# Patient Record
Sex: FEMALE | Race: WHITE | NOT HISPANIC OR LATINO | Employment: UNEMPLOYED | URBAN - METROPOLITAN AREA
[De-identification: names, ages, dates, MRNs, and addresses within clinical notes are randomized per-mention and may not be internally consistent; named-entity substitution may affect disease eponyms.]

---

## 2017-04-20 ENCOUNTER — HOSPITAL ENCOUNTER (EMERGENCY)
Facility: MEDICAL CENTER | Age: 22
End: 2017-04-20
Attending: EMERGENCY MEDICINE
Payer: MEDICAID

## 2017-04-20 ENCOUNTER — APPOINTMENT (OUTPATIENT)
Dept: RADIOLOGY | Facility: MEDICAL CENTER | Age: 22
End: 2017-04-20
Attending: EMERGENCY MEDICINE
Payer: MEDICAID

## 2017-04-20 VITALS
RESPIRATION RATE: 18 BRPM | DIASTOLIC BLOOD PRESSURE: 66 MMHG | SYSTOLIC BLOOD PRESSURE: 116 MMHG | OXYGEN SATURATION: 98 % | HEART RATE: 88 BPM | WEIGHT: 155.2 LBS | BODY MASS INDEX: 30.47 KG/M2 | TEMPERATURE: 98 F | HEIGHT: 60 IN

## 2017-04-20 DIAGNOSIS — N20.0 KIDNEY CALCULI: ICD-10-CM

## 2017-04-20 LAB
APPEARANCE UR: CLEAR
COLOR UR: YELLOW
GLUCOSE UR STRIP.AUTO-MCNC: NEGATIVE MG/DL
HCG UR QL: NEGATIVE
KETONES UR STRIP.AUTO-MCNC: NEGATIVE MG/DL
LEUKOCYTE ESTERASE UR QL STRIP.AUTO: NEGATIVE
NITRITE UR QL STRIP.AUTO: NEGATIVE
PH UR STRIP.AUTO: 6 [PH]
PROT UR QL STRIP: NEGATIVE MG/DL
RBC UR QL AUTO: NEGATIVE
SP GR UR STRIP.AUTO: 1.02

## 2017-04-20 PROCEDURE — 81002 URINALYSIS NONAUTO W/O SCOPE: CPT

## 2017-04-20 PROCEDURE — 99283 EMERGENCY DEPT VISIT LOW MDM: CPT

## 2017-04-20 PROCEDURE — 74176 CT ABD & PELVIS W/O CONTRAST: CPT

## 2017-04-20 PROCEDURE — 81025 URINE PREGNANCY TEST: CPT

## 2017-04-20 ASSESSMENT — PAIN SCALES - GENERAL: PAINLEVEL_OUTOF10: 7

## 2017-04-20 NOTE — ED NOTES
assistng with care-pt rounding. Pt in no apparent distress in kohler bed. Vs as charted, denies needs

## 2017-04-20 NOTE — ED AVS SNAPSHOT
4/20/2017    Marlys Jane  [address] [place] [address]    Dear Marlys:    [facility] wants to ensure your discharge home is safe and you or your loved ones have had all of your questions answered regarding your care after you leave the hospital.    Below is a list of resources and contact information should you have any questions regarding your hospital stay, follow-up instructions, or active medical symptoms.    Questions or Concerns Regarding… Contact   Medical Questions Related to Your Discharge  (7 days a week, 8am-5pm) Contact a Nurse Care Coordinator   [phone]   Medical Questions Not Related to Your Discharge  (24 hours a day / 7 days a week)  Contact the Nurse Health Line   [phone]    Medications or Discharge Instructions Refer to your discharge packet   or contact your [facility] Primary Care Provider   [phone]   Follow-up Appointment(s) Schedule your appointment via [employer]   or contact Scheduling [phone]   Billing Review your statement via [employer]  or contact Billing [phone]   Medical Records Review your records via [employer]   or contact Medical Records [phone]     You may receive a telephone call within two days of discharge. This call is to make certain you understand your discharge instructions and have the opportunity to have any questions answered. You can also easily access your medical information, test results and upcoming appointments via the [employer] free online health management tool. You can learn more and sign up at [employer]oodle. For assistance setting up your [employer] account, please call [phone].    Once again, we want to ensure your discharge home is safe and that you have a clear understanding of any next steps in your care. If you have any questions or concerns, please do not hesitate to contact us, we are here for you. Thank you for choosing [facility] for your healthcare needs.    Sincerely,    Your [facility] Healthcare Team

## 2017-04-20 NOTE — ED AVS SNAPSHOT
Drop Development Access Code: [email or web address]  Expires: 5/20/2017  5:30 PM    Drop Development  A secure, online tool to manage your health information     [employer]’s Drop Development® is a secure, online tool that connects you to your personalized health information from the privacy of your home -- day or night - making it very easy for you to manage your healthcare. Once the activation process is completed, you can even access your medical information using the Drop Development colleen, which is available for free in the Apple Colleen store or Google Play store.     Drop Development provides the following levels of access (as shown below):   My Chart Features   [facility] Primary Care Doctor [facility]  Specialists [facility]  Urgent  Care Non-[facility]  Primary Care  Doctor   Email your healthcare team securely and privately 24/7 X X X X   Manage appointments: schedule your next appointment; view details of past/upcoming appointments X      Request prescription refills. X      View recent personal medical records, including lab and immunizations X X X X   View health record, including health history, allergies, medications X X X X   Read reports about your outpatient visits, procedures, consult and ER notes X X X X   See your discharge summary, which is a recap of your hospital and/or ER visit that includes your diagnosis, lab results, and care plan. X X       How to register for Drop Development:  1. Go to  [email or web address].  2. Click on the Sign Up Now box, which takes you to the New Member Sign Up page. You will need to provide the following information:  a. Enter your Drop Development Access Code exactly as it appears at the top of this page. (You will not need to use this code after you’ve completed the sign-up process. If you do not sign up before the expiration date, you must request a new code.)   b. Enter your date of birth.   c. Enter your home email address.   d. Click Submit, and follow the next screen’s instructions.  3. Create a Drop Development ID. This will be your Drop Development  login ID and cannot be changed, so think of one that is secure and easy to remember.  4. Create a QM [employer] password. You can change your password at any time.  5. Enter your Password Reset Question and Answer. This can be used at a later time if you forget your password.   6. Enter your e-mail address. This allows you to receive e-mail notifications when new information is available in QM Power.  7. Click Sign Up. You can now view your health information.    For assistance activating your [employer] account, call [phone]

## 2017-04-20 NOTE — ED PROVIDER NOTES
ED Provider Note    CHIEF COMPLAINT  Chief Complaint   Patient presents with   • Urinary Pain   • Back Pain       HPI  Marlys Jane is a 21 y.o. female who presents to the emergency department complaining of left flank pain. She states that she has a history of kidney stones in the past and started having severe pain in the left flank earlier this morning. Pain is 7/10 with no eliciting or alleviating factors. She had nausea but no vomiting. She also complains slight dysuria but no hematuria.    REVIEW OF SYSTEMS  Positives as above. Pertinent negatives include fever, hematochezia, melena, shortness of breath, chest pain  All other review of systems are negative    PAST MEDICAL HISTORY  Past Medical History   Diagnosis Date   • Psychiatric disorder      anxiety   • Gall stones        FAMILY HISTORY  Noncontributory    SOCIAL HISTORY  Social History     Social History   • Marital Status: Single     Spouse Name: N/A   • Number of Children: N/A   • Years of Education: N/A     Social History Main Topics   • Smoking status: Never Smoker    • Smokeless tobacco: Never Used   • Alcohol Use: Yes   • Drug Use: No   • Sexual Activity: Not Asked     Other Topics Concern   • None     Social History Narrative       SURGICAL HISTORY  Past Surgical History   Procedure Laterality Date   • Cystoscopy stent placement N/A 5/17/2016     Procedure: CYSTOSCOPY STENT PLACEMENT;  Surgeon: Sherif Lees M.D.;  Location: SURGERY [facility];  Service:    • Ureteroscopy Right 5/17/2016     Procedure: URETEROSCOPY;  Surgeon: Sherif Lees M.D.;  Location: SURGERY [facility];  Service:    • Lasertripsy Right 5/17/2016     Procedure: LASERTRIPSY LITHO;  Surgeon: Sherif Lees M.D.;  Location: SURGERY [facility];  Service:        CURRENT MEDICATIONS  Home Medications     Reviewed by Pam Mauro (Pharmacy Tech) on 04/20/17 at 1623  Med List Status: Complete    Medication Last Dose Status          Patient Jewel  Taking any Medications                        ALLERGIES  No Known Allergies    PHYSICAL EXAM  VITAL SIGNS: /66 mmHg  Pulse 88  Temp(Src) 36.7 °C (98 °F)  Resp 18  Ht 1.524 m (5')  Wt 70.4 kg (155 lb 3.3 oz)  BMI 30.31 kg/m2  SpO2 98%  LMP 03/13/2017     Constitutional: Well developed, Well nourished, No acute distress, Non-toxic appearance.   Eyes: PERRLA, EOMI, Conjunctiva normal, No discharge.   Cardiovascular: Normal heart rate, Normal rhythm, No murmurs, No rubs, No gallops, and intact distal pulses.   Thorax & Lungs:  No respiratory distress, no rales, no rhonchi, No wheezing, No chest wall tenderness.   Abdomen: Bowel sounds normal, Soft, No tenderness, No guarding, No rebound, No pulsatile masses.   Musculoskeletal: Slight left CVA tenderness  Skin: Warm, Dry, No erythema, No rash.   Extremities: Full range of motion, no deformity, no edema.  Neurologic: Alert & oriented x 3, No focal deficits noted, acting appropriately on exam.  Psychiatric: Affect normal for clinical presentation.    RADIOLOGY/PROCEDURES  CT-RENAL COLIC EVALUATION(A/P W/O)   Final Result         1.  No hydronephrosis. Stable 2 stones in the left kidney. One small stone in the upper pole the right kidney may have developed compared with 11/24/2016.      2.  No acute inflammatory or obstructive process.        Results for orders placed or performed during the hospital encounter of 04/20/17   POC UA   Result Value Ref Range    POC Color Yellow     POC Appearance Clear     POC Glucose Negative Negative mg/dL    POC Ketones Negative Negative mg/dL    POC Specific Gravity 1.025 1.005-1.030    POC Blood Negative Negative    POC Urine PH 6.0 5.0-8.0    POC Protein Negative Negative mg/dL    POC Nitrites Negative Negative    POC Leukocyte Esterase Negative Negative   POC URINE PREGNANCY   Result Value Ref Range    POC Urine Pregnancy Test Negative        COURSE & MEDICAL DECISION MAKING  Pertinent Labs & Imaging studies reviewed. (See  chart for details)  This is a [facility] 21-year-old female presents with flank pain. As concern for possible kidney stone. CT scan reveals evidence of 2 small stable calculi in the left kidney but notes he ureterolithiasis, notes of hydronephrosis or significant obstruction. The patient is not pregnant urinary tract infection. The patient will be following up with Dr. Cardenas with urology for further evaluation and management. She understands the need for follow-up to the emergency department if she has severe pain, nausea or vomiting.  New Prescriptions    No medications on file       FINAL IMPRESSION     1. Kidney calculi      The patient will return for new or worsening symptoms and is stable at the time of discharge.    The patient is referred to a primary physician for blood pressure management, diabetic screening, and for all other preventative health concerns.    DISPOSITION:  Patient will be discharged home in stable condition.    FOLLOW UP:  [facility], Emergency Dept  [address]  [place] [id number] [phone]    If symptoms worsen    Anthony Cardenas M.D.  [address] [place] [address]  [phone]    Schedule an appointment as soon as possible for a visit  As needed      OUTPATIENT MEDICATIONS:  New Prescriptions    No medications on file              Electronically signed by: Oskar Morton, 4/20/2017 4:55 PM

## 2017-04-20 NOTE — ED AVS SNAPSHOT
Home Care Instructions                                                                                                                Marlys Jane   MRN: [id number]    Department:  [facility], Emergency Dept   Date of Visit:  4/20/2017            [facility], Emergency Dept    [address] [place] [address]    Phone:  [phone]      You were seen by     Oskar Morton D.O.      Your Diagnosis Was     Kidney calculi     N20.0       Follow-up Information     1. Follow up with [facility], Emergency Dept.    Specialty:  Emergency Medicine    Why:  If symptoms worsen    Contact information    [address] [place] [address] [phone]        2. Schedule an appointment as soon as possible for a visit with Anthony Cardenas M.D..    Specialty:  Urology    Why:  As needed    Contact information    [address] [place] [address] [phone]        Medication Information     Review all of your home medications and newly ordered medications with your primary doctor and/or pharmacist as soon as possible. Follow medication instructions as directed by your doctor and/or pharmacist.     Please keep your complete medication list with you and share with your physician. Update the information when medications are discontinued, doses are changed, or new medications (including over-the-counter products) are added; and carry medication information at all times in the event of emergency situations.               Medication List      Notice     You have not been prescribed any medications.            Procedures and tests performed during your visit     CT-RENAL COLIC EVALUATION(A/P W/O)    POC UA    POC URINE PREGNANCY        Discharge Instructions       Kidney Stones  Kidney stones (urolithiasis) are deposits that form inside your kidneys. The intense pain is caused by the stone moving through the urinary tract. When  the stone moves, the ureter goes into spasm around the stone. The stone is usually passed in the urine.   CAUSES   · A disorder that makes certain neck glands produce too much parathyroid hormone (primary hyperparathyroidism).  · A buildup of uric acid crystals, similar to gout in your joints.  · Narrowing (stricture) of the ureter.  · A kidney obstruction present at birth (congenital obstruction).  · Previous surgery on the kidney or ureters.  · Numerous kidney infections.  SYMPTOMS   · Feeling sick to your stomach (nauseous).  · Throwing up (vomiting).  · Blood in the urine (hematuria).  · Pain that usually spreads (radiates) to the groin.  · Frequency or urgency of urination.  DIAGNOSIS   · Taking a history and physical exam.  · Blood or urine tests.  · CT scan.  · Occasionally, an examination of the inside of the urinary bladder (cystoscopy) is performed.  TREATMENT   · Observation.  · Increasing your fluid intake.  · Extracorporeal shock wave lithotripsy--This is a noninvasive procedure that uses shock waves to break up kidney stones.  · Surgery may be needed if you have severe pain or persistent obstruction. There are various surgical procedures. Most of the procedures are performed with the use of small instruments. Only small incisions are needed to accommodate these instruments, so recovery time is minimized.  The size, location, and chemical composition are all important variables that will determine the proper choice of action for you. Talk to your health care provider to better understand your situation so that you will minimize the risk of injury to yourself and your kidney.   HOME CARE INSTRUCTIONS   · Drink enough water and fluids to keep your urine clear or pale yellow. This will help you to pass the stone or stone fragments.  · Strain all urine through the provided strainer. Keep all particulate matter and stones for your health care provider to see. The stone causing the pain may be as small as a  grain of salt. It is very important to use the strainer each and every time you pass your urine. The collection of your stone will allow your health care provider to analyze it and verify that a stone has actually passed. The stone analysis will often identify what you can do to reduce the incidence of recurrences.  · Only take over-the-counter or prescription medicines for pain, discomfort, or fever as directed by your health care provider.  · Make a follow-up appointment with your health care provider as directed.  · Get follow-up X-rays if required. The absence of pain does not always mean that the stone has passed. It may have only stopped moving. If the urine remains completely obstructed, it can cause loss of kidney function or even complete destruction of the kidney. It is your responsibility to make sure X-rays and follow-ups are completed. Ultrasounds of the kidney can show blockages and the status of the kidney. Ultrasounds are not associated with any radiation and can be performed easily in a matter of minutes.  SEEK MEDICAL CARE IF:  · You experience pain that is progressive and unresponsive to any pain medicine you have been prescribed.  SEEK IMMEDIATE MEDICAL CARE IF:   · Pain cannot be controlled with the prescribed medicine.  · You have a fever or shaking chills.  · The severity or intensity of pain increases over 18 hours and is not relieved by pain medicine.  · You develop a new onset of abdominal pain.  · You feel faint or pass out.  · You are unable to urinate.  MAKE SURE YOU:   · Understand these instructions.  · Will watch your condition.  · Will get help right away if you are not doing well or get worse.     This information is not intended to replace advice given to you by your health care provider. Make sure you discuss any questions you have with your health care provider.     Document Released: 12/18/2006 Document Revised: 01/08/2016 Document Reviewed: 05/21/2014  [employer]  Patient Education ©2016 [employer] Inc.            Patient Information     Patient Information    Following emergency treatment: all patient requiring follow-up care must return either to a private physician or a clinic if your condition worsens before you are able to obtain further medical attention, please return to the emergency room.     Billing Information    At [facility], we work to make the billing process streamlined for our patients.  Our Representatives are here to answer any questions you may have regarding your hospital bill.  If you have insurance coverage and have supplied your insurance information to us, we will submit a claim to your insurer on your behalf.  Should you have any questions regarding your bill, we can be reached online or by phone as follows:  Online: You are able pay your bills online or live chat with our representatives about any billing questions you may have. We are here to help Monday - Friday from 8:00am to 7:30pm and 9:00am - 12:00pm on Saturdays.  Please visit [email or web address]  for more information.   Phone:  [phone] or [phone]    Please note that your emergency physician, surgeon, pathologist, radiologist, anesthesiologist, and other specialists are not employed by [facility] and will therefore bill separately for their services.  Please contact them directly for any questions concerning their bills at the numbers below:     Emergency Physician Services:  [phone]  [facility]:  [phone]  [facility]:  [phone]  [facility]:  [phone]    1. Your final bill may vary from the amount quoted upon discharge if all procedures are not complete at that time, or if your doctor has additional procedures of which we are not aware. You will receive an additional bill if you return to the Emergency Department at [facility] for suture removal regardless of the facility of  which the sutures were placed.     2. Please arrange for settlement of this account at the emergency registration.    3. All self-pay accounts are due in full at the time of treatment.  If you are unable to meet this obligation then payment is expected within 4-5 days.     4. If you have had radiology studies (CT, X-ray, Ultrasound, MRI), you have received a preliminary result during your emergency department visit. Please contact the radiology department [phone] to receive a copy of your final result. Please discuss the Final result with your primary physician or with the follow up physician provided.     Crisis Hotline:  [place] Crisis Hotline:  6-221-ULHLYOG or 1-500.646.9669  [place] Crisis Hotline:    1-504.620.4019 or 277-160-8099         ED Discharge Follow Up Questions    1. In order to provide you with very good care, we would like to follow up with a phone call in the next few days.  May we have your permission to contact you?     YES /  NO    2. What is the best phone number to call you? (       )_____-__________    3. What is the best time to call you?      Morning  /  Afternoon  /  Evening                   Patient Signature:  ____________________________________________________________    Date:  ____________________________________________________________

## 2017-04-21 NOTE — ED NOTES
Pt D/C to home. D/C instructions given. Pt v/u. Pt leaves ED with no acute changes, complaints or concerns.

## 2017-04-21 NOTE — DISCHARGE INSTRUCTIONS
Kidney Stones  Kidney stones (urolithiasis) are deposits that form inside your kidneys. The intense pain is caused by the stone moving through the urinary tract. When the stone moves, the ureter goes into spasm around the stone. The stone is usually passed in the urine.   CAUSES   · A disorder that makes certain neck glands produce too much parathyroid hormone (primary hyperparathyroidism).  · A buildup of uric acid crystals, similar to gout in your joints.  · Narrowing (stricture) of the ureter.  · A kidney obstruction present at birth (congenital obstruction).  · Previous surgery on the kidney or ureters.  · Numerous kidney infections.  SYMPTOMS   · Feeling sick to your stomach (nauseous).  · Throwing up (vomiting).  · Blood in the urine (hematuria).  · Pain that usually spreads (radiates) to the groin.  · Frequency or urgency of urination.  DIAGNOSIS   · Taking a history and physical exam.  · Blood or urine tests.  · CT scan.  · Occasionally, an examination of the inside of the urinary bladder (cystoscopy) is performed.  TREATMENT   · Observation.  · Increasing your fluid intake.  · Extracorporeal shock wave lithotripsy--This is a noninvasive procedure that uses shock waves to break up kidney stones.  · Surgery may be needed if you have severe pain or persistent obstruction. There are various surgical procedures. Most of the procedures are performed with the use of small instruments. Only small incisions are needed to accommodate these instruments, so recovery time is minimized.  The size, location, and chemical composition are all important variables that will determine the proper choice of action for you. Talk to your health care provider to better understand your situation so that you will minimize the risk of injury to yourself and your kidney.   HOME CARE INSTRUCTIONS   · Drink enough water and fluids to keep your urine clear or pale yellow. This will help you to pass the stone or stone fragments.  · Strain  all urine through the provided strainer. Keep all particulate matter and stones for your health care provider to see. The stone causing the pain may be as small as a grain of salt. It is very important to use the strainer each and every time you pass your urine. The collection of your stone will allow your health care provider to analyze it and verify that a stone has actually passed. The stone analysis will often identify what you can do to reduce the incidence of recurrences.  · Only take over-the-counter or prescription medicines for pain, discomfort, or fever as directed by your health care provider.  · Make a follow-up appointment with your health care provider as directed.  · Get follow-up X-rays if required. The absence of pain does not always mean that the stone has passed. It may have only stopped moving. If the urine remains completely obstructed, it can cause loss of kidney function or even complete destruction of the kidney. It is your responsibility to make sure X-rays and follow-ups are completed. Ultrasounds of the kidney can show blockages and the status of the kidney. Ultrasounds are not associated with any radiation and can be performed easily in a matter of minutes.  SEEK MEDICAL CARE IF:  · You experience pain that is progressive and unresponsive to any pain medicine you have been prescribed.  SEEK IMMEDIATE MEDICAL CARE IF:   · Pain cannot be controlled with the prescribed medicine.  · You have a fever or shaking chills.  · The severity or intensity of pain increases over 18 hours and is not relieved by pain medicine.  · You develop a new onset of abdominal pain.  · You feel faint or pass out.  · You are unable to urinate.  MAKE SURE YOU:   · Understand these instructions.  · Will watch your condition.  · Will get help right away if you are not doing well or get worse.     This information is not intended to replace advice given to you by your health care provider. Make sure you discuss any  questions you have with your health care provider.     Document Released: 12/18/2006 Document Revised: 01/08/2016 Document Reviewed: 05/21/2014  [employer] Interactive Patient Education ©2016 [employer] Inc.

## 2017-09-29 ENCOUNTER — APPOINTMENT (OUTPATIENT)
Dept: RADIOLOGY | Facility: MEDICAL CENTER | Age: 22
End: 2017-09-29
Attending: EMERGENCY MEDICINE
Payer: MEDICAID

## 2017-09-29 ENCOUNTER — HOSPITAL ENCOUNTER (EMERGENCY)
Facility: MEDICAL CENTER | Age: 22
End: 2017-09-29
Attending: EMERGENCY MEDICINE
Payer: MEDICAID

## 2017-09-29 VITALS
OXYGEN SATURATION: 97 % | DIASTOLIC BLOOD PRESSURE: 70 MMHG | WEIGHT: 160.5 LBS | RESPIRATION RATE: 16 BRPM | SYSTOLIC BLOOD PRESSURE: 115 MMHG | HEART RATE: 80 BPM | TEMPERATURE: 98.1 F | BODY MASS INDEX: 31.51 KG/M2 | HEIGHT: 60 IN

## 2017-09-29 DIAGNOSIS — O20.0 THREATENED MISCARRIAGE IN EARLY PREGNANCY: ICD-10-CM

## 2017-09-29 LAB
APPEARANCE UR: CLEAR
B-HCG SERPL-ACNC: 112.1 MIU/ML (ref 0–5)
BACTERIA #/AREA URNS HPF: ABNORMAL /HPF
BASOPHILS # BLD AUTO: 0.3 % (ref 0–1.8)
BASOPHILS # BLD: 0.03 K/UL (ref 0–0.12)
BILIRUB UR QL STRIP.AUTO: NEGATIVE
COLOR UR: YELLOW
EOSINOPHIL # BLD AUTO: 0.05 K/UL (ref 0–0.51)
EOSINOPHIL NFR BLD: 0.6 % (ref 0–6.9)
EPI CELLS #/AREA URNS HPF: ABNORMAL /HPF
ERYTHROCYTE [DISTWIDTH] IN BLOOD BY AUTOMATED COUNT: 41.2 FL (ref 35.9–50)
GLUCOSE UR STRIP.AUTO-MCNC: NEGATIVE MG/DL
HCT VFR BLD AUTO: 41 % (ref 37–47)
HGB BLD-MCNC: 14.1 G/DL (ref 12–16)
HYALINE CASTS #/AREA URNS LPF: ABNORMAL /LPF
IMM GRANULOCYTES # BLD AUTO: 0.02 K/UL (ref 0–0.11)
IMM GRANULOCYTES NFR BLD AUTO: 0.2 % (ref 0–0.9)
KETONES UR STRIP.AUTO-MCNC: NEGATIVE MG/DL
LEUKOCYTE ESTERASE UR QL STRIP.AUTO: ABNORMAL
LYMPHOCYTES # BLD AUTO: 3.33 K/UL (ref 1–4.8)
LYMPHOCYTES NFR BLD: 36.7 % (ref 22–41)
MCH RBC QN AUTO: 31.8 PG (ref 27–33)
MCHC RBC AUTO-ENTMCNC: 34.4 G/DL (ref 33.6–35)
MCV RBC AUTO: 92.6 FL (ref 81.4–97.8)
MICRO URNS: ABNORMAL
MONOCYTES # BLD AUTO: 0.57 K/UL (ref 0–0.85)
MONOCYTES NFR BLD AUTO: 6.3 % (ref 0–13.4)
NEUTROPHILS # BLD AUTO: 5.07 K/UL (ref 2–7.15)
NEUTROPHILS NFR BLD: 55.9 % (ref 44–72)
NITRITE UR QL STRIP.AUTO: NEGATIVE
NRBC # BLD AUTO: 0 K/UL
NRBC BLD AUTO-RTO: 0 /100 WBC
NUMBER OF RH DOSES IND 8505RD: NORMAL
PH UR STRIP.AUTO: 5 [PH]
PLATELET # BLD AUTO: 293 K/UL (ref 164–446)
PMV BLD AUTO: 9.1 FL (ref 9–12.9)
PROT UR QL STRIP: NEGATIVE MG/DL
RBC # BLD AUTO: 4.43 M/UL (ref 4.2–5.4)
RBC # URNS HPF: ABNORMAL /HPF
RBC UR QL AUTO: NEGATIVE
RH BLD: NORMAL
SP GR UR STRIP.AUTO: 1.02
UROBILINOGEN UR STRIP.AUTO-MCNC: NORMAL MG/DL
WBC # BLD AUTO: 9.1 K/UL (ref 4.8–10.8)
WBC #/AREA URNS HPF: ABNORMAL /HPF

## 2017-09-29 PROCEDURE — 99284 EMERGENCY DEPT VISIT MOD MDM: CPT

## 2017-09-29 PROCEDURE — 84702 CHORIONIC GONADOTROPIN TEST: CPT

## 2017-09-29 PROCEDURE — 85025 COMPLETE CBC W/AUTO DIFF WBC: CPT

## 2017-09-29 PROCEDURE — 36415 COLL VENOUS BLD VENIPUNCTURE: CPT

## 2017-09-29 PROCEDURE — 86901 BLOOD TYPING SEROLOGIC RH(D): CPT

## 2017-09-29 PROCEDURE — 81001 URINALYSIS AUTO W/SCOPE: CPT

## 2017-09-29 PROCEDURE — 76817 TRANSVAGINAL US OBSTETRIC: CPT

## 2017-09-29 ASSESSMENT — PAIN SCALES - GENERAL
PAINLEVEL_OUTOF10: 3
PAINLEVEL_OUTOF10: 5

## 2017-09-29 ASSESSMENT — PAIN SCALES - WONG BAKER: WONGBAKER_NUMERICALRESPONSE: HURTS A LITTLE MORE

## 2017-09-30 NOTE — ED NOTES
Pt to triage c/o abd cramping starting today. Pt had positive home pregnancy test today. Denies vaginal bleeding.   Pt advised to return to triage nurse for any changes or concerns.

## 2017-09-30 NOTE — ED PROVIDER NOTES
ED Provider Note    CHIEF COMPLAINT  Chief Complaint   Patient presents with   • Cramping   • Pregnancy       HPI  Marlys Jane is a 22 y.o. female who presents for evaluation of lower abdominal central cramping. The patient is on birth control. She is multiparous. She has had 3 miscarriages. She reports no recollection of this. But she is on birth control. She took 4 positive home pregnancy test. She reports no vaginal bleeding no dysuria no high fevers or chills. No associated night sweats or weight loss. No abdominal surgical history. No other complaints currently    REVIEW OF SYSTEMS  See HPI for further details. No night sweats or weight loss numbness tingling weakness fevers or chills All other systems are negative.     PAST MEDICAL HISTORY  Past Medical History:   Diagnosis Date   • Gall stones    • Psychiatric disorder     anxiety   History of kidney stones     FAMILY HISTORY  No history of bleeding disorder    SOCIAL HISTORY  Social History     Social History   • Marital status: Single     Spouse name: N/A   • Number of children: N/A   • Years of education: N/A     Social History Main Topics   • Smoking status: Never Smoker   • Smokeless tobacco: Never Used   • Alcohol use Yes   • Drug use: No   • Sexual activity: Not on file     Other Topics Concern   • Not on file     Social History Narrative   • No narrative on file     Nonsmoker denies IV drugs occasional alcohol  SURGICAL HISTORY  Past Surgical History:   Procedure Laterality Date   • CYSTOSCOPY STENT PLACEMENT N/A 5/17/2016    Procedure: CYSTOSCOPY STENT PLACEMENT;  Surgeon: Sherif Lees M.D.;  Location: [facility];  Service:    • URETEROSCOPY Right 5/17/2016    Procedure: URETEROSCOPY;  Surgeon: Sherif Lees M.D.;  Location: [facility];  Service:    • LASERTRIPSY Right 5/17/2016    Procedure: LASERTRIPSY LITHO;  Surgeon: Sherif Lees M.D.;  Location: [facility];  Service:        CURRENT  MEDICATIONS  No current facility-administered medications for this encounter.   No current outpatient prescriptions on file.      ALLERGIES  No Known Allergies    PHYSICAL EXAM  VITAL SIGNS: /69   Pulse 85   Temp 36.7 °C (98.1 °F)   Resp 16   Ht 1.524 m (5')   Wt 72.8 kg (160 lb 7.9 oz)   SpO2 98%   BMI 31.34 kg/m²  Room air O2: 98    Constitutional: Well developed, Well nourished, No acute distress, Non-toxic appearance.   HENT: Normocephalic, Atraumatic, Bilateral external ears normal, Oropharynx moist, No oral exudates, Nose normal.   Eyes: PERRLA, EOMI, Conjunctiva normal, No discharge.   Neck: Normal range of motion, No tenderness, Supple, No stridor.   Cardiovascular: Normal heart rate, Normal rhythm, No murmurs, No rubs, No gallops.   Thorax & Lungs: Normal breath sounds, No respiratory distress, No wheezing, No chest tenderness.   Abdomen: Bowel sounds normal, Soft, No tenderness, No masses, No pulsatile masses.   Skin: Warm, Dry, No erythema, No rash.   Back: No tenderness, No CVA tenderness.   Genitalia: Patient declined pelvic exam   Extremities: Intact distal pulses, No edema, No tenderness, No cyanosis, No clubbing.   Neurologic: Alert & oriented x 3, Normal motor function, Normal sensory function, No focal deficits noted.   Psychiatric: Anxious      Results for orders placed or performed during the hospital encounter of 09/29/17   CBC WITH DIFFERENTIAL   Result Value Ref Range    WBC 9.1 4.8 - 10.8 K/uL    RBC 4.43 4.20 - 5.40 M/uL    Hemoglobin 14.1 12.0 - 16.0 g/dL    Hematocrit 41.0 37.0 - 47.0 %    MCV 92.6 81.4 - 97.8 fL    MCH 31.8 27.0 - 33.0 pg    MCHC 34.4 33.6 - 35.0 g/dL    RDW 41.2 35.9 - 50.0 fL    Platelet Count 293 164 - 446 K/uL    MPV 9.1 9.0 - 12.9 fL    Neutrophils-Polys 55.90 44.00 - 72.00 %    Lymphocytes 36.70 22.00 - 41.00 %    Monocytes 6.30 0.00 - 13.40 %    Eosinophils 0.60 0.00 - 6.90 %    Basophils 0.30 0.00 - 1.80 %    Immature Granulocytes 0.20 0.00 - 0.90 %     Nucleated RBC 0.00 /100 WBC    Neutrophils (Absolute) 5.07 2.00 - 7.15 K/uL    Lymphs (Absolute) 3.33 1.00 - 4.80 K/uL    Monos (Absolute) 0.57 0.00 - 0.85 K/uL    Eos (Absolute) 0.05 0.00 - 0.51 K/uL    Baso (Absolute) 0.03 0.00 - 0.12 K/uL    Immature Granulocytes (abs) 0.02 0.00 - 0.11 K/uL    NRBC (Absolute) 0.00 K/uL   HCG QUANTITATIVE   Result Value Ref Range    Bhcg 112.1 (H) 0.0 - 5.0 mIU/mL   RH TYPE FOR RHOGAM FROM E.D.   Result Value Ref Range    Emergency Department Rh Typing POS     Number Of Rh Doses Indicated ZERO    URINALYSIS   Result Value Ref Range    Micro Urine Req Microscopic     Color Yellow     Character Clear     Specific Gravity 1.025 <1.035    Ph 5.0 5.0 - 8.0    Glucose Negative Negative mg/dL    Ketones Negative Negative mg/dL    Protein Negative Negative mg/dL    Bilirubin Negative Negative    Urobilinogen, Urine Normal Negative    Nitrite Negative Negative    Leukocyte Esterase Trace (A) Negative    Occult Blood Negative Negative   URINE MICROSCOPIC (W/UA)   Result Value Ref Range    WBC 0-2 /hpf    RBC 2-5 (A) /hpf    Bacteria Few (A) None /hpf    Epithelial Cells Few /hpf    Hyaline Cast 0-2 /lpf      US-OB PELVIS TRANSVAGINAL   Final Result      1.  No intrauterine gestation is identified. Differential considerations include early gestation, missed , and nonvisualized ectopic pregnancy. Close clinical monitoring is therefore warranted with followup ultrasound or serial beta hCG levels.      2.  Endometrial complex thickening at 1.1 cm consistent with decidual reaction.      3.  Normal flow to each ovary. 1.7 cm probable corpus luteum cyst in the left ovary.      4.  Small amount of free fluid in the pelvis in each adnexa.          COURSE & MEDICAL DECISION MAKING  Pertinent Labs & Imaging studies reviewed. (See chart for details)  Patient here did not any evidence of infection no evidence of tachycardia or hypotension. Rh is positive hCG is only 112. The patient only had  a positive pregnancy differential diagnosis includes early pregnancy possible miscarriage in progress or missed . There is no obvious ectopic visualized but that is also a possibility. I've given the patient a laboratory slip for repeat quantitative hCG in 72 hours. I gave her very strict return precautions.   She will be referred to gynecology for follow-up    FINAL IMPRESSION  1.  1. Threatened miscarriage in early pregnancy               Electronically signed by: Eyal Sanchez, 2017 6:28 PM

## 2017-09-30 NOTE — DISCHARGE INSTRUCTIONS
Threatened Miscarriage  A threatened miscarriage is when you have vaginal bleeding during your first 20 weeks of pregnancy but the pregnancy has not ended. Your doctor will do tests to make sure you are still pregnant. The cause of the bleeding may not be known. This condition does not mean your pregnancy will end. It does increase the risk of it ending (complete miscarriage).  HOME CARE   · Make sure you keep all your doctor visits for prenatal care.  · Get plenty of rest.  · Do not have sex or use tampons if you have vaginal bleeding.  · Do not douche.  · Do not smoke or use drugs.  · Do not drink alcohol.  · Avoid caffeine.  GET HELP IF:  · You have light bleeding from your vagina.  · You have belly pain or cramping.  · You have a fever.  GET HELP RIGHT AWAY IF:   · You have heavy bleeding from your vagina.  · You have clots of blood coming from your vagina.  · You have bad pain or cramps in your low back or belly.  · You have fever, chills, and bad belly pain.  MAKE SURE YOU:   · Understand these instructions.  · Will watch your condition.  · Will get help right away if you are not doing well or get worse.     This information is not intended to replace advice given to you by your health care provider. Make sure you discuss any questions you have with your health care provider.     Document Released: 2009 Document Revised: 2014 Document Reviewed: 10/14/2014  [employer] Interactive Patient Education ©6 [employer]    Pelvic Rest  Pelvic rest is sometimes recommended for women when:   · The placenta is partially or completely covering the opening of the cervix (placenta previa).  · There is bleeding between the uterine wall and the amniotic sac in the first trimester (subchorionic hemorrhage).  · The cervix begins to open without labor starting (incompetent cervix, cervical insufficiency).  · The labor is too early ( labor).  HOME CARE INSTRUCTIONS  · Do not have sexual intercourse,  stimulation, or an orgasm.  · Do not use tampons, douche, or put anything in the vagina.  · Do not lift anything over 10 pounds (4.5 kg).  · Avoid strenuous activity or straining your pelvic muscles.  SEEK MEDICAL CARE IF:   · You have any vaginal bleeding during pregnancy. Treat this as a potential emergency.  · You have cramping pain felt low in the stomach (stronger than menstrual cramps).  · You notice vaginal discharge (watery, mucus, or bloody).  · You have a low, dull backache.  · There are regular contractions or uterine tightening.  SEEK IMMEDIATE MEDICAL CARE IF:  You have vaginal bleeding and have placenta previa.      This information is not intended to replace advice given to you by your health care provider. Make sure you discuss any questions you have with your health care provider.     Document Released: 04/13/2012 Document Revised: 03/11/2013 Document Reviewed: 04/13/2012  [employer] Interactive Patient Education ©2016 [employer]

## 2017-12-05 ENCOUNTER — NON-PROVIDER VISIT (OUTPATIENT)
Dept: OCCUPATIONAL MEDICINE | Facility: CLINIC | Age: 22
End: 2017-12-05

## 2017-12-05 DIAGNOSIS — Z11.1 ENCOUNTER FOR PPD TEST: ICD-10-CM

## 2017-12-05 PROCEDURE — 86580 TB INTRADERMAL TEST: CPT | Performed by: PREVENTIVE MEDICINE

## 2017-12-07 ENCOUNTER — NON-PROVIDER VISIT (OUTPATIENT)
Dept: OCCUPATIONAL MEDICINE | Facility: CLINIC | Age: 22
End: 2017-12-07

## 2017-12-07 LAB — TB WHEAL 3D P 5 TU DIAM: NORMAL MM

## 2018-01-06 ENCOUNTER — HOSPITAL ENCOUNTER (OUTPATIENT)
Dept: LAB | Facility: MEDICAL CENTER | Age: 23
End: 2018-01-06
Attending: FAMILY MEDICINE
Payer: MEDICAID

## 2018-01-06 LAB
ABO GROUP BLD: NORMAL
APPEARANCE UR: CLEAR
BASOPHILS # BLD AUTO: 0.2 % (ref 0–1.8)
BASOPHILS # BLD: 0.02 K/UL (ref 0–0.12)
BILIRUB UR QL STRIP.AUTO: NEGATIVE
BLD GP AB SCN SERPL QL: NORMAL
COLOR UR: YELLOW
CULTURE IF INDICATED INDCX: NO UA CULTURE
EOSINOPHIL # BLD AUTO: 0.03 K/UL (ref 0–0.51)
EOSINOPHIL NFR BLD: 0.3 % (ref 0–6.9)
ERYTHROCYTE [DISTWIDTH] IN BLOOD BY AUTOMATED COUNT: 43.6 FL (ref 35.9–50)
GLUCOSE UR STRIP.AUTO-MCNC: NEGATIVE MG/DL
HBV SURFACE AG SER QL: NEGATIVE
HCT VFR BLD AUTO: 36.8 % (ref 37–47)
HGB BLD-MCNC: 12.4 G/DL (ref 12–16)
HIV 1+2 AB+HIV1 P24 AG SERPL QL IA: NON REACTIVE
IMM GRANULOCYTES # BLD AUTO: 0.04 K/UL (ref 0–0.11)
IMM GRANULOCYTES NFR BLD AUTO: 0.4 % (ref 0–0.9)
KETONES UR STRIP.AUTO-MCNC: NEGATIVE MG/DL
LEUKOCYTE ESTERASE UR QL STRIP.AUTO: NEGATIVE
LYMPHOCYTES # BLD AUTO: 2.09 K/UL (ref 1–4.8)
LYMPHOCYTES NFR BLD: 21.4 % (ref 22–41)
MCH RBC QN AUTO: 31.6 PG (ref 27–33)
MCHC RBC AUTO-ENTMCNC: 33.7 G/DL (ref 33.6–35)
MCV RBC AUTO: 93.6 FL (ref 81.4–97.8)
MICRO URNS: NORMAL
MONOCYTES # BLD AUTO: 0.57 K/UL (ref 0–0.85)
MONOCYTES NFR BLD AUTO: 5.8 % (ref 0–13.4)
NEUTROPHILS # BLD AUTO: 7.03 K/UL (ref 2–7.15)
NEUTROPHILS NFR BLD: 71.9 % (ref 44–72)
NITRITE UR QL STRIP.AUTO: NEGATIVE
NRBC # BLD AUTO: 0 K/UL
NRBC BLD-RTO: 0 /100 WBC
PH UR STRIP.AUTO: 6 [PH]
PLATELET # BLD AUTO: 262 K/UL (ref 164–446)
PMV BLD AUTO: 9.7 FL (ref 9–12.9)
PROT UR QL STRIP: NEGATIVE MG/DL
RBC # BLD AUTO: 3.93 M/UL (ref 4.2–5.4)
RBC UR QL AUTO: NEGATIVE
RH BLD: NORMAL
RUBV AB SER QL: 35.7 IU/ML
SP GR UR STRIP.AUTO: 1.03
TREPONEMA PALLIDUM IGG+IGM AB [PRESENCE] IN SERUM OR PLASMA BY IMMUNOASSAY: NON REACTIVE
UROBILINOGEN UR STRIP.AUTO-MCNC: 1 MG/DL
WBC # BLD AUTO: 9.8 K/UL (ref 4.8–10.8)

## 2018-01-06 PROCEDURE — 36415 COLL VENOUS BLD VENIPUNCTURE: CPT

## 2018-01-06 PROCEDURE — 86900 BLOOD TYPING SEROLOGIC ABO: CPT

## 2018-01-06 PROCEDURE — 86901 BLOOD TYPING SEROLOGIC RH(D): CPT

## 2018-01-06 PROCEDURE — 81003 URINALYSIS AUTO W/O SCOPE: CPT

## 2018-01-06 PROCEDURE — 87077 CULTURE AEROBIC IDENTIFY: CPT

## 2018-01-06 PROCEDURE — 87389 HIV-1 AG W/HIV-1&-2 AB AG IA: CPT

## 2018-01-06 PROCEDURE — 81511 FTL CGEN ABNOR FOUR ANAL: CPT

## 2018-01-06 PROCEDURE — 87086 URINE CULTURE/COLONY COUNT: CPT

## 2018-01-06 PROCEDURE — 87591 N.GONORRHOEAE DNA AMP PROB: CPT

## 2018-01-06 PROCEDURE — 87491 CHLMYD TRACH DNA AMP PROBE: CPT

## 2018-01-06 PROCEDURE — 86850 RBC ANTIBODY SCREEN: CPT

## 2018-01-07 LAB
C TRACH DNA SPEC QL NAA+PROBE: NEGATIVE
N GONORRHOEA DNA SPEC QL NAA+PROBE: NEGATIVE
SPECIMEN SOURCE: NORMAL

## 2018-01-08 LAB
BACTERIA UR CULT: ABNORMAL
BACTERIA UR CULT: ABNORMAL
SIGNIFICANT IND 70042: ABNORMAL
SITE SITE: ABNORMAL
SOURCE SOURCE: ABNORMAL

## 2018-01-09 LAB
# FETUSES US: NORMAL
AFP MOM SERPL: 1.12
AFP SERPL-MCNC: 46 NG/ML
AGE - REPORTED: 24.8 YR
GA METHOD: NORMAL
GA: 18 WEEKS
HCG MOM SERPL: 0.78
HCG SERPL-ACNC: NORMAL IU/L
IDDM PATIENT QL: NO
INHIBIN A MOM SERPL: 1.05
INHIBIN A SERPL-MCNC: 160 PG/ML
INTEGRATED SCN PATIENT-IMP: NORMAL
PATHOLOGY STUDY: NORMAL
U ESTRIOL MOM SERPL: 1.04
U ESTRIOL SERPL-MCNC: 1.5 NG/ML

## 2018-01-12 ENCOUNTER — HOSPITAL ENCOUNTER (EMERGENCY)
Facility: MEDICAL CENTER | Age: 23
End: 2018-01-12
Attending: EMERGENCY MEDICINE
Payer: MEDICAID

## 2018-01-12 ENCOUNTER — APPOINTMENT (OUTPATIENT)
Dept: RADIOLOGY | Facility: MEDICAL CENTER | Age: 23
End: 2018-01-12
Attending: EMERGENCY MEDICINE
Payer: MEDICAID

## 2018-01-12 VITALS
OXYGEN SATURATION: 97 % | WEIGHT: 160.94 LBS | BODY MASS INDEX: 31.6 KG/M2 | HEIGHT: 60 IN | DIASTOLIC BLOOD PRESSURE: 68 MMHG | RESPIRATION RATE: 18 BRPM | TEMPERATURE: 98 F | HEART RATE: 98 BPM | SYSTOLIC BLOOD PRESSURE: 113 MMHG

## 2018-01-12 DIAGNOSIS — N93.9 VAGINAL SPOTTING: ICD-10-CM

## 2018-01-12 DIAGNOSIS — Z34.92 SECOND TRIMESTER PREGNANCY: ICD-10-CM

## 2018-01-12 LAB
BASOPHILS # BLD AUTO: 0.1 % (ref 0–1.8)
BASOPHILS # BLD: 0.01 K/UL (ref 0–0.12)
EOSINOPHIL # BLD AUTO: 0.04 K/UL (ref 0–0.51)
EOSINOPHIL NFR BLD: 0.4 % (ref 0–6.9)
ERYTHROCYTE [DISTWIDTH] IN BLOOD BY AUTOMATED COUNT: 43.3 FL (ref 35.9–50)
HCT VFR BLD AUTO: 33.3 % (ref 37–47)
HGB BLD-MCNC: 11.5 G/DL (ref 12–16)
IMM GRANULOCYTES # BLD AUTO: 0.04 K/UL (ref 0–0.11)
IMM GRANULOCYTES NFR BLD AUTO: 0.4 % (ref 0–0.9)
LYMPHOCYTES # BLD AUTO: 2.22 K/UL (ref 1–4.8)
LYMPHOCYTES NFR BLD: 24 % (ref 22–41)
MCH RBC QN AUTO: 31.6 PG (ref 27–33)
MCHC RBC AUTO-ENTMCNC: 34.5 G/DL (ref 33.6–35)
MCV RBC AUTO: 91.5 FL (ref 81.4–97.8)
MONOCYTES # BLD AUTO: 0.49 K/UL (ref 0–0.85)
MONOCYTES NFR BLD AUTO: 5.3 % (ref 0–13.4)
NEUTROPHILS # BLD AUTO: 6.46 K/UL (ref 2–7.15)
NEUTROPHILS NFR BLD: 69.8 % (ref 44–72)
NRBC # BLD AUTO: 0 K/UL
NRBC BLD-RTO: 0 /100 WBC
PLATELET # BLD AUTO: 214 K/UL (ref 164–446)
PMV BLD AUTO: 9 FL (ref 9–12.9)
RBC # BLD AUTO: 3.64 M/UL (ref 4.2–5.4)
WBC # BLD AUTO: 9.3 K/UL (ref 4.8–10.8)

## 2018-01-12 PROCEDURE — 36415 COLL VENOUS BLD VENIPUNCTURE: CPT

## 2018-01-12 PROCEDURE — 76815 OB US LIMITED FETUS(S): CPT

## 2018-01-12 PROCEDURE — 99284 EMERGENCY DEPT VISIT MOD MDM: CPT

## 2018-01-12 PROCEDURE — 85025 COMPLETE CBC W/AUTO DIFF WBC: CPT

## 2018-01-12 ASSESSMENT — PAIN SCALES - GENERAL: PAINLEVEL_OUTOF10: 2

## 2018-01-12 NOTE — ED NOTES
Pt reports a positive history of pregnancy with acute onset of spotting and episodic mild cramping sine earlier today.

## 2018-01-12 NOTE — ED PROVIDER NOTES
ED Provider Note    CHIEF COMPLAINT   No chief complaint on file.      HPI   Marlys Jane is a 22 y.o. female who presents with vaginal spotting, onset this morning.  No clots, no tissue passed.  Patient states she is approximately 19 weeks pregnant.  His had previous ultrasounds showing normal intrauterine pregnancy.  She states her blood type is Rh+, results from the blood they confirm this.  No dysuria.  No vaginal discharge.  She has follow-up appointment with her gynecologist already scheduled.  She denies trauma.  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  Patient states she had blood work and urine tested within the past week, showing normal urinalysis.    REVIEW OF SYSTEMS  Genitourinary vaginal spotting, dysuria  Constitutional: No fever or dizziness  Gastrointestinal: No abdominal pain, no current cramping  Musculoskeletal no acute back pain       All other systems are negative.        PAST MEDICAL HISTORY  Past Medical History:   Diagnosis Date   • Gall stones    • Psychiatric disorder     anxiety       FAMILY HISTORY  Family History   Problem Relation Age of Onset   • Family history unknown: Yes       SOCIAL HISTORY  Social History     Social History   • Marital status: Single     Spouse name: N/A   • Number of children: N/A   • Years of education: N/A     Social History Main Topics   • Smoking status: Never Smoker   • Smokeless tobacco: Never Used   • Alcohol use No   • Drug use: No   • Sexual activity: Not on file     Other Topics Concern   • Not on file     Social History Narrative   • No narrative on file       SURGICAL HISTORY  Past Surgical History:   Procedure Laterality Date   • CYSTOSCOPY STENT PLACEMENT N/A 5/17/2016    Procedure: CYSTOSCOPY STENT PLACEMENT;  Surgeon: Sherif Lees M.D.;  Location: SURGERY [facility];  Service:    • URETEROSCOPY Right 5/17/2016    Procedure: URETEROSCOPY;  Surgeon:  Sherif Lees M.D.;  Location: SURGERY [facility];  Service:    • LASERTRIPSY Right 5/17/2016    Procedure: LASERTRIPSY LITHO;  Surgeon: Sherif Lees M.D.;  Location: SURGERY [facility];  Service:        CURRENT MEDICATIONS  No current facility-administered medications on file prior to encounter.      No current outpatient prescriptions on file prior to encounter.       ALLERGIES  No Known Allergies    PHYSICAL EXAM  VITAL SIGNS: /68   Pulse 98   Temp 36.7 °C (98 °F)   Resp 18   Ht 1.524 m (5') Comment: Stated  Wt 73 kg (160 lb 15 oz)   LMP 01/12/2018   SpO2 97%   BMI 31.43 kg/m²   Constitutional:  Well-nourished  HENT:  Atraumatic, Bilateral external ears normal, Oropharynx moist  Eyes:  Conjunctiva normal, No discharge.   Cardiovascular: Normal heart rate, Normal rhythm   Pulmonary: Normal breath sounds, No respiratory distress  Abdomen: Abdomen is soft, gravid, nontender.  No guarding.  Uterus is palpable below the umbilicus  Genitourinary: Deferred  Skin: Warm, Dry, No erythema, No rash.   Musculoskeletal: No tenderness, No CVA tenderness.   Neurologic: Strength is symmetric, intact    RADIOLOGY/PROCEDURES  US-OB LIMITED TRANSABDOMINAL   Final Result      1.  Single intrauterine pregnancy of an estimated gestational age of 18w 6d with an estimated date of delivery of 6/9/2018.      2.  No placental abnormality         INTERPRETING LOCATION: [address] [place] [address] [facility], [facility], [id number]        Results for orders placed or performed during the hospital encounter of 01/12/18   CBC WITH DIFFERENTIAL   Result Value Ref Range    WBC 9.3 4.8 - 10.8 K/uL    RBC 3.64 (L) 4.20 - 5.40 M/uL    Hemoglobin 11.5 (L) 12.0 - 16.0 g/dL    Hematocrit 33.3 (L) 37.0 - 47.0 %    MCV 91.5 81.4 - 97.8 fL    MCH 31.6 27.0 - 33.0 pg    MCHC 34.5 33.6 - 35.0 g/dL    RDW 43.3 35.9 - 50.0 fL    Platelet Count 214 164 - 446 K/uL    MPV 9.0 9.0 - 12.9 fL    Neutrophils-Polys 69.80 44.00 - 72.00 %    Lymphocytes 24.00 22.00  - 41.00 %    Monocytes 5.30 0.00 - 13.40 %    Eosinophils 0.40 0.00 - 6.90 %    Basophils 0.10 0.00 - 1.80 %    Immature Granulocytes 0.40 0.00 - 0.90 %    Nucleated RBC 0.00 /100 WBC    Neutrophils (Absolute) 6.46 2.00 - 7.15 K/uL    Lymphs (Absolute) 2.22 1.00 - 4.80 K/uL    Monos (Absolute) 0.49 0.00 - 0.85 K/uL    Eos (Absolute) 0.04 0.00 - 0.51 K/uL    Baso (Absolute) 0.01 0.00 - 0.12 K/uL    Immature Granulocytes (abs) 0.04 0.00 - 0.11 K/uL    NRBC (Absolute) 0.00 K/uL         COURSE & MEDICAL DECISION MAKING  Pertinent Labs & Imaging studies reviewed. (See chart for details)  Patient with mild anemia, likely physiologic during this pregnancy.  Her vital signs are normal at this time.  She appears comfortable.  Utility of pelvic exam was discussed with the patient, this time she is asking exam to be deferred stating she will be following up with her obstetrician.  The ultrasound obtained showed normal 18 week intrauterine pregnancy.  This patient will avoid heavy exertion, was given a work note she requested with no heavy lifting for the next week or until cleared by her obstetrician to resume normal activity.    FINAL IMPRESSION  1. Vaginal spotting    2. Second trimester pregnancy            Electronically signed by: Zeke Bustos, 1/12/2018 5:44 PM

## 2018-01-12 NOTE — ED NOTES
Discharge instructions provided.  Pt verbalized the understanding of discharge instructions to follow up with OBGYN,  PCP and to return to ER if condition worsens.  Pt ambulated out of ER without difficulty.

## 2018-01-12 NOTE — DISCHARGE INSTRUCTIONS
Vaginal Bleeding During Pregnancy, Second Trimester   A small amount of bleeding (spotting) from the vagina is common in pregnancy. Sometimes the bleeding is normal and is not a problem, and sometimes it is a sign of something serious. Be sure to tell your doctor about any bleeding from your vagina right away.  HOME CARE  · Watch your condition for any changes.  · Follow your doctor's instructions about how active you can be.  · If you are on bed rest:  ¨ You may need to stay in bed and only get up to use the bathroom.  ¨ You may be allowed to do some activities.  ¨ If you need help, make plans for someone to help you.  · Write down:  ¨ The number of pads you use each day.  ¨ How often you change pads.  ¨ How soaked (saturated) your pads are.  · Do not use tampons.  · Do not douche.  · Do not have sex or orgasms until your doctor says it is okay.  · If you pass any tissue from your vagina, save the tissue so you can show it to your doctor.  · Only take medicines as told by your doctor.  · Do not take aspirin because it can make you bleed.  · Do not exercise, lift heavy weights, or do any activities that take a lot of energy and effort unless your doctor says it is okay.  · Keep all follow-up visits as told by your doctor.  GET HELP IF:   · You bleed from your vagina.  · You have cramps.  · You have labor pains.  · You have a fever that does not go away after you take medicine.  GET HELP RIGHT AWAY IF:  · You have very bad cramps in your back or belly (abdomen).  · You have contractions.  · You have chills.  · You pass large clots or tissue from your vagina.  · You bleed more.  · You feel light-headed or weak.  · You pass out (faint).  · You are leaking fluid or have a gush of fluid from your vagina.  MAKE SURE YOU:  · Understand these instructions.  · Will watch your condition.  · Will get help right away if you are not doing well or get worse.     This information is not intended to replace advice given to you by  your health care provider. Make sure you discuss any questions you have with your health care provider.     Document Released: 05/04/2015 Document Reviewed: 05/04/2015  [employer] Interactive Patient Education ©2016 [employer]

## 2018-02-05 ENCOUNTER — INITIAL PRENATAL (OUTPATIENT)
Dept: OBGYN | Facility: CLINIC | Age: 23
End: 2018-02-05
Payer: MEDICAID

## 2018-02-05 ENCOUNTER — HOSPITAL ENCOUNTER (OUTPATIENT)
Facility: MEDICAL CENTER | Age: 23
End: 2018-02-05
Attending: NURSE PRACTITIONER
Payer: MEDICAID

## 2018-02-05 VITALS
SYSTOLIC BLOOD PRESSURE: 122 MMHG | DIASTOLIC BLOOD PRESSURE: 60 MMHG | WEIGHT: 164 LBS | BODY MASS INDEX: 32.2 KG/M2 | HEIGHT: 60 IN

## 2018-02-05 DIAGNOSIS — Z34.82 ENCOUNTER FOR SUPERVISION OF OTHER NORMAL PREGNANCY, SECOND TRIMESTER: ICD-10-CM

## 2018-02-05 DIAGNOSIS — Z34.82 ENCOUNTER FOR SUPERVISION OF OTHER NORMAL PREGNANCY, SECOND TRIMESTER: Primary | ICD-10-CM

## 2018-02-05 DIAGNOSIS — R82.71 GBS BACTERIURIA: ICD-10-CM

## 2018-02-05 DIAGNOSIS — B96.89 BV (BACTERIAL VAGINOSIS): ICD-10-CM

## 2018-02-05 DIAGNOSIS — N76.0 BV (BACTERIAL VAGINOSIS): ICD-10-CM

## 2018-02-05 PROBLEM — Z87.51 HISTORY OF PRETERM DELIVERY: Status: ACTIVE | Noted: 2018-02-05

## 2018-02-05 LAB
APPEARANCE UR: NORMAL
BILIRUB UR STRIP-MCNC: NORMAL MG/DL
COLOR UR AUTO: NORMAL
CYTOLOGY REG CYTOL: NORMAL
GLUCOSE UR STRIP.AUTO-MCNC: NEGATIVE MG/DL
KETONES UR STRIP.AUTO-MCNC: NEGATIVE MG/DL
LEUKOCYTE ESTERASE UR QL STRIP.AUTO: NORMAL
NITRITE UR QL STRIP.AUTO: NEGATIVE
PH UR STRIP.AUTO: 5 [PH] (ref 5–8)
PROT UR QL STRIP: NORMAL MG/DL
RBC UR QL AUTO: NEGATIVE
SP GR UR STRIP.AUTO: 1.02
UROBILINOGEN UR STRIP-MCNC: NORMAL MG/DL

## 2018-02-05 PROCEDURE — 59401 PR NEW OB VISIT: CPT | Performed by: NURSE PRACTITIONER

## 2018-02-05 PROCEDURE — 88175 CYTOPATH C/V AUTO FLUID REDO: CPT

## 2018-02-05 PROCEDURE — 81002 URINALYSIS NONAUTO W/O SCOPE: CPT | Performed by: NURSE PRACTITIONER

## 2018-02-05 RX ORDER — METRONIDAZOLE 500 MG/1
500 TABLET ORAL EVERY 12 HOURS
Qty: 14 TAB | Refills: 0 | Status: SHIPPED | OUTPATIENT
Start: 2018-02-05 | End: 2018-02-12

## 2018-02-05 NOTE — PROGRESS NOTES
Pt. Here for NOB visit today.  Good   Good   # [phone]  Pt is a transfer of care from [facility] records obtained.   Pt. States no complaints.   Pharmacy verified.   Pt had Flu vaccine at [facility].

## 2018-02-05 NOTE — PATIENT INSTRUCTIONS
P:  1.  GC/CT deferred. Pap done.         2.  Prenatal records here for review.x        3.  Discussed PNV, diet, and adequate water intake        4.  NOB packet given        5.  Return to office in 4 wks        6.  Dr. Avila appt on 2/13/18.          7.  Rx sent to pharmacy for flagyl and ampicillin.

## 2018-02-05 NOTE — PROGRESS NOTES
S:  Marlys Jane is a 22 y.o.   @ EGA: 22w2d SALVATORE: Estimated Date of Delivery: 18  per US who presents for her new OB exam.  She has no complaints.  Already completed AFP.  Declines CF.  Reports good FM.  Denies VB, LOF, or cramping.  Denies dysuria, vaginal DC.  Pt is single and lives with boyfriend (David). Works as at day care.  Pregnancy is unplanned but wanted.  Too late for Centering Pregnancy.    O:    Vitals:    18 1426   BP: 122/60   Weight: 74.4 kg (164 lb)   Height: 1.524 m (5')    See H&P Prenatal Physical.  Wet mount: +whiff +clue         FHTs: 150        Fundal ht: 22     Prenatal Record Review  H/h 12  Platelets 262  GC CT Neg  Rubella Imm  Hep B Neg  T Pallidum NR  AFP Normal  A pos  Ab neg  GBS bacturia  SALVATORE 18 per US      A:   1.  IUP @ 22w2d SALVATORE: Estimated Date of Delivery: 18 per US         2.  S=D        3.    Patient Active Problem List    Diagnosis Date Noted   • Encounter for supervision of other normal pregnancy, second trimester 2018   • GBS bacteriuria 2018   • History of  delivery 2018   • BV (bacterial vaginosis) 2018         P:  1.  GC/CT deferred. Pap done.         2.  Prenatal records here for review.x        3.  Discussed PNV, diet, and adequate water intake        4.  NOB packet given        5.  Return to office in 4 wks        6.  Dr. Avila appt on 18.          7.  Rx sent to pharmacy for flagyl and ampicillin.

## 2018-02-05 NOTE — LETTER
Cystic Fibrosis Carrier Testing  Marlys Jane    The following information is about a blood test that can be done to determine if you and/or your partner carry the gene for cystic fibrosis.    WHAT IS CYSTIC FIBROSIS?  · Cystic fibrosis (CF) is an inherited disease that affects more than 25,000 American children and young adults.  · Symptoms of CF vary but include lung congestion, pneumonia, diarrhea and poor growth.  Most people with CF have severe medical problems and some die at a young age.  Others have so few symptoms they are unaware they have CF.  · CF does not affect intelligence.  · Although there is no cure for CF at this time, scientists are making progress in improving treatment and in searching for a cure.  In the past many people with CF  at a very young age.  Today, many are living into their 20’s and 30’s.    IS THERE A CHANCE MY BABY COULD HAVE CYSTIC FIBROSIS?  · You can have a child with CF even if there is no history in your family (see chart below).  · CF testing can help determine if you are a carrier and at risk to have a child with CF.  Note: if both parents are carriers, there is a 1 in 4 (25%) chance with each pregnancy that they will have a child with CF.  · Carriers have one normal CF gene and one altered CF gene.  · People with CF have two altered CF genes.  · Most people have two normal copies of the CF gene.    Approximate risk that a couple with no family history of cystic fibrosis will have a child with cystic fibrosis:    Ethnic background / Risk     couple:  1 in 2,500   couple:  1 in 15,000            couple:  1 in 8,000     American couple:  1 in 32,000     WHAT TESTING IS AVAILABLE?  · There is a blood test that can be done to find out if you or your partner is a carrier.  · It is important to understand that CF carrier testing does not detect all CF carriers.  · If the test shows that you are both CF carriers, you unborn baby can be  tested to find out if the baby has CF.    HOW MUCH DOES IT COST TO HAVE CYSTIC FIBROSIS CARRIER TESTING?  · Cost and insurance coverage for CF carrier testing vary depending upon the laboratory used and your insurance policy.  · The average cost for CF carrier testing is $300 per person.  · Your genetic counselor can provide you with more information about cystic fibrosis carrier testing.    _____  Yes, I am interested in discussing carrier testing with a genetic counselor.    _____  No, I am not interested in CF carrier testing or in receiving more information about CF carrier testing.      Client signature: ________________________________________  2/5/2018

## 2018-02-07 ENCOUNTER — DATING (OUTPATIENT)
Dept: OBGYN | Facility: CLINIC | Age: 23
End: 2018-02-07

## 2018-02-09 ENCOUNTER — TELEPHONE (OUTPATIENT)
Dept: OBGYN | Facility: CLINIC | Age: 23
End: 2018-02-09

## 2018-02-09 NOTE — TELEPHONE ENCOUNTER
----- Message from Jeny Schmitt C.N.M. sent at 2/8/2018  1:07 PM PST -----  +BV - will send rx to pharmacy on file.      Called pt and pt states she received Rx on 2/5/18 and she is taking it now. Advised pt to finish Tx. Pt agreed and verbalized understanding.

## 2018-03-07 ENCOUNTER — ROUTINE PRENATAL (OUTPATIENT)
Dept: OBGYN | Facility: CLINIC | Age: 23
End: 2018-03-07
Payer: MEDICAID

## 2018-03-07 VITALS — DIASTOLIC BLOOD PRESSURE: 60 MMHG | BODY MASS INDEX: 34.18 KG/M2 | SYSTOLIC BLOOD PRESSURE: 116 MMHG | WEIGHT: 175 LBS

## 2018-03-07 DIAGNOSIS — N76.0 BV (BACTERIAL VAGINOSIS): ICD-10-CM

## 2018-03-07 DIAGNOSIS — Z34.82 ENCOUNTER FOR SUPERVISION OF OTHER NORMAL PREGNANCY, SECOND TRIMESTER: Primary | ICD-10-CM

## 2018-03-07 DIAGNOSIS — B96.89 BV (BACTERIAL VAGINOSIS): ICD-10-CM

## 2018-03-07 DIAGNOSIS — Z87.51 HISTORY OF PRETERM DELIVERY: ICD-10-CM

## 2018-03-07 DIAGNOSIS — R82.71 GBS BACTERIURIA: ICD-10-CM

## 2018-03-07 PROCEDURE — 90040 PR PRENATAL FOLLOW UP: CPT | Performed by: NURSE PRACTITIONER

## 2018-03-07 NOTE — PROGRESS NOTES
OB f/u. + fetal movement.  No VB, LOF or UC's.  Good phone # [phone]  Preferred pharmacy confirmed.  3rd trimester lab orders pended and instructions given to patient    Pt had appt with MOODY on 2-13-18, no further appts  Pt c/o Jhonny Lyon

## 2018-03-07 NOTE — PATIENT INSTRUCTIONS
P:  1. Questions answered.           2. Encouraged adequate water intake        3.   labor precautions reviewed.        4.  F/u 4 wks.        5.  28wk labs ordered.         6.  Pelvic rest.

## 2018-03-07 NOTE — PROGRESS NOTES
S:  Pt is  at 26w4d here for routine OB follow up.  Reports an occ UC.  Reports good FM.  Denies VB, RUCs, LOF or vaginal DC. Took all of her abx for UTI.    O:  Please see above vitals.        FHTs: 150        Fundal ht: 26 cm.        Complete OB US: pending - will call Oki for results    A:  IUP at 26w4d  Patient Active Problem List    Diagnosis Date Noted   • Encounter for supervision of other normal pregnancy, second trimester 2018   • GBS bacteriuria 2018   • History of  delivery 2018   • BV (bacterial vaginosis) 2018       P:  1. Questions answered.           2. Encouraged adequate water intake        3.   labor precautions reviewed.        4.  F/u 4 wks.        5.  28wk labs ordered.         6.  Pelvic rest.

## 2018-03-12 ENCOUNTER — TELEPHONE (OUTPATIENT)
Dept: OBGYN | Facility: CLINIC | Age: 23
End: 2018-03-12

## 2018-03-12 ENCOUNTER — HOSPITAL ENCOUNTER (OUTPATIENT)
Dept: RADIOLOGY | Facility: MEDICAL CENTER | Age: 23
End: 2018-03-12
Attending: NURSE PRACTITIONER
Payer: MEDICAID

## 2018-03-12 ENCOUNTER — DATING (OUTPATIENT)
Dept: OBGYN | Facility: CLINIC | Age: 23
End: 2018-03-12

## 2018-03-12 ENCOUNTER — ROUTINE PRENATAL (OUTPATIENT)
Dept: OBGYN | Facility: CLINIC | Age: 23
End: 2018-03-12
Payer: MEDICAID

## 2018-03-12 VITALS — WEIGHT: 175 LBS | BODY MASS INDEX: 34.18 KG/M2 | DIASTOLIC BLOOD PRESSURE: 62 MMHG | SYSTOLIC BLOOD PRESSURE: 118 MMHG

## 2018-03-12 DIAGNOSIS — Z34.82 ENCOUNTER FOR SUPERVISION OF OTHER NORMAL PREGNANCY, SECOND TRIMESTER: ICD-10-CM

## 2018-03-12 DIAGNOSIS — R82.71 GBS BACTERIURIA: ICD-10-CM

## 2018-03-12 DIAGNOSIS — N76.0 BV (BACTERIAL VAGINOSIS): ICD-10-CM

## 2018-03-12 DIAGNOSIS — B96.89 BV (BACTERIAL VAGINOSIS): ICD-10-CM

## 2018-03-12 DIAGNOSIS — Z87.51 HISTORY OF PRETERM DELIVERY: ICD-10-CM

## 2018-03-12 PROCEDURE — 76817 TRANSVAGINAL US OBSTETRIC: CPT

## 2018-03-12 PROCEDURE — 90040 PR PRENATAL FOLLOW UP: CPT | Performed by: NURSE PRACTITIONER

## 2018-03-12 NOTE — TELEPHONE ENCOUNTER
Pt called today 2018 states that  Had a US at 3D , and was told that have a Funneling Cervix  And is concerned about it because she had two  babies. And also was told that she need a fernanda injection. And pt called insurance to see if she can get a fernanda and was told that it was to late for it because is 27w already.   Pt would like to be seen ASAP     Sintia Corrales D.N.P   Was informed and  agreed to see pt today. Ann Marie TRUJILLO scheduled an appt for 10:15 am.    Pt was informed and agreed to be here at 10:15 am   No further questions

## 2018-03-12 NOTE — PROGRESS NOTES
Pt here today for fit it due to [facility] telling her she has a funneling cervix.   Pt states has had leaking and cramping x 1 month.   Reports +FM  Good # [phone]  Pharmacy Confirmed.

## 2018-03-30 ENCOUNTER — HOSPITAL ENCOUNTER (OUTPATIENT)
Facility: MEDICAL CENTER | Age: 23
End: 2018-03-30
Attending: OBSTETRICS & GYNECOLOGY | Admitting: OBSTETRICS & GYNECOLOGY
Payer: MEDICAID

## 2018-03-30 VITALS
HEART RATE: 104 BPM | BODY MASS INDEX: 34.36 KG/M2 | HEIGHT: 60 IN | SYSTOLIC BLOOD PRESSURE: 121 MMHG | WEIGHT: 175 LBS | TEMPERATURE: 97.8 F | DIASTOLIC BLOOD PRESSURE: 69 MMHG

## 2018-03-30 LAB
APPEARANCE UR: CLEAR
COLOR UR AUTO: YELLOW
FIBRONECTIN FETAL SPEC QL: NEGATIVE
GLUCOSE UR QL STRIP.AUTO: NEGATIVE MG/DL
KETONES UR QL STRIP.AUTO: NEGATIVE MG/DL
LEUKOCYTE ESTERASE UR QL STRIP.AUTO: NEGATIVE
NITRITE UR QL STRIP.AUTO: NEGATIVE
PH UR STRIP.AUTO: 7 [PH]
PROT UR QL STRIP: NEGATIVE MG/DL
RBC UR QL AUTO: NEGATIVE
SP GR UR: 1.01

## 2018-03-30 PROCEDURE — 81002 URINALYSIS NONAUTO W/O SCOPE: CPT

## 2018-03-30 PROCEDURE — 82731 ASSAY OF FETAL FIBRONECTIN: CPT

## 2018-03-30 PROCEDURE — 59025 FETAL NON-STRESS TEST: CPT | Performed by: OBSTETRICS & GYNECOLOGY

## 2018-03-30 NOTE — PROGRESS NOTES
, SALVATORE: , 29.6 weeks  Pt presents to L&D with c/o of constant dull lower back pain that started this morning and vaginal pressure.  Pt has a history of two  deliveries. Denies intercourse or having anything in the vagina in the last 24-hours. Pt denies LOF, VB, +FM. EFM/[place] placed. VSS. Dr. Suarez notified, orders received to obtain an FFN, check cervix, and orally hydrate.  1300: Sterile speculum placed, FFN obtained. SVE, external os 1cm, internal os: closed/thick/floating.  1355: UA done, see results. Dr. Red updated. She will update Dr. Suarez.  1400: Orders received for discharge. Pt discharged home. General L&D instructions given with emphasis on  labor. Pt verbalized understanding.

## 2018-04-04 ENCOUNTER — HOSPITAL ENCOUNTER (OUTPATIENT)
Dept: LAB | Facility: MEDICAL CENTER | Age: 23
End: 2018-04-04
Attending: NURSE PRACTITIONER
Payer: MEDICAID

## 2018-04-04 DIAGNOSIS — Z34.82 ENCOUNTER FOR SUPERVISION OF OTHER NORMAL PREGNANCY, SECOND TRIMESTER: ICD-10-CM

## 2018-04-04 LAB
GLUCOSE 1H P 50 G GLC PO SERPL-MCNC: 143 MG/DL (ref 70–139)
HCT VFR BLD AUTO: 36.5 % (ref 37–47)
HGB BLD-MCNC: 11.8 G/DL (ref 12–16)
TREPONEMA PALLIDUM IGG+IGM AB [PRESENCE] IN SERUM OR PLASMA BY IMMUNOASSAY: NON REACTIVE

## 2018-04-04 PROCEDURE — 86780 TREPONEMA PALLIDUM: CPT

## 2018-04-04 PROCEDURE — 82950 GLUCOSE TEST: CPT

## 2018-04-04 PROCEDURE — 85014 HEMATOCRIT: CPT

## 2018-04-04 PROCEDURE — 85018 HEMOGLOBIN: CPT

## 2018-04-04 PROCEDURE — 36415 COLL VENOUS BLD VENIPUNCTURE: CPT

## 2018-04-05 ENCOUNTER — ROUTINE PRENATAL (OUTPATIENT)
Dept: OBGYN | Facility: CLINIC | Age: 23
End: 2018-04-05
Payer: MEDICAID

## 2018-04-05 VITALS — WEIGHT: 180 LBS | SYSTOLIC BLOOD PRESSURE: 102 MMHG | DIASTOLIC BLOOD PRESSURE: 58 MMHG | BODY MASS INDEX: 35.15 KG/M2

## 2018-04-05 DIAGNOSIS — Z34.82 ENCOUNTER FOR SUPERVISION OF OTHER NORMAL PREGNANCY, SECOND TRIMESTER: ICD-10-CM

## 2018-04-05 DIAGNOSIS — R82.71 GBS BACTERIURIA: ICD-10-CM

## 2018-04-05 DIAGNOSIS — Z87.51 HISTORY OF PRETERM DELIVERY: ICD-10-CM

## 2018-04-05 DIAGNOSIS — Z34.83 ENCOUNTER FOR SUPERVISION OF OTHER NORMAL PREGNANCY, THIRD TRIMESTER: ICD-10-CM

## 2018-04-05 DIAGNOSIS — N76.0 BV (BACTERIAL VAGINOSIS): ICD-10-CM

## 2018-04-05 DIAGNOSIS — B96.89 BV (BACTERIAL VAGINOSIS): ICD-10-CM

## 2018-04-05 DIAGNOSIS — R73.09 ABNORMAL GTT (GLUCOSE TOLERANCE TEST): Primary | ICD-10-CM

## 2018-04-05 PROCEDURE — 90715 TDAP VACCINE 7 YRS/> IM: CPT | Performed by: NURSE PRACTITIONER

## 2018-04-05 PROCEDURE — 90040 PR PRENATAL FOLLOW UP: CPT | Performed by: NURSE PRACTITIONER

## 2018-04-05 PROCEDURE — 90471 IMMUNIZATION ADMIN: CPT | Performed by: NURSE PRACTITIONER

## 2018-04-05 ASSESSMENT — PATIENT HEALTH QUESTIONNAIRE - PHQ9: CLINICAL INTERPRETATION OF PHQ2 SCORE: 0

## 2018-04-05 NOTE — PROGRESS NOTES
Ob f/u. + fetal movement good  No VB, LOF or contractions   C/O random contractions   Phone number # [phone]  Pharmacy verified with patient   WT=  180 lbs         BP=  3 hr glucose test ordered today   Tdap vaccine given. 04/05/2018 Right  Deltoid. VIS given and screening check list reviewed with pt. reviewed by ALPESH Spencer

## 2018-04-05 NOTE — LETTER
2018      Marlys Jane is currently pregnant and being cared for by [facility]. She is high risk due to her history of having  labors and delivery. She should not be working more than 20 hours a week, should not be lifting more than 10 pounds and should not be standing for very long periods of time. Thank you for accommodating based on the circumstances.           Thank you,          ALPESH Sommer.    Electronically Signed

## 2018-04-05 NOTE — PROGRESS NOTES
SUBJECTIVE:  Pt is a 22 y.o.   at 30w5d  gestation. Presents today for follow-up prenatal care. Reports no issues at this time.  Reports good fetal movement. Denies cramping/contractions, bleeding or leaking of fluid. Denies dysuria, headaches, N/V, or other issues at this time. Generally feels well today. Pt was not approved for fernanda but her cervical length is normal. Reports moods stable, no issues with anxiety.     OBJECTIVE:  - See prenatal vitals flow  Vitals:    18 1340   BP: 102/58   Weight: 81.6 kg (180 lb)                 ASSESSMENT:   - IUP at 30w5d by LMP which is consistent with  9 week US   - S=D   -   Patient Active Problem List    Diagnosis Date Noted   • Encounter for supervision of other normal pregnancy, third trimester 2018   • GBS bacteriuria 2018   • History of  delivery 2018   • BV (bacterial vaginosis) 2018         PLAN:  - 3 hr GTT to be done ASAP  - To LnD with any cramping contractions not relieved by hydration and rest   - S/sx pregnancy and labor warning signs vs general discomforts discussed  - Fetal movements and kick counts reviewed   - Adequate hydration reinforced  - Nutrition/exercise/vitamin education; continued PNV  - S/p TDAP vacc today   - Reports Flu vacc at [facility]  - Anticipatory guidance given  - RTC in 2 weeks for follow-up prenatal care

## 2018-04-06 ENCOUNTER — TELEPHONE (OUTPATIENT)
Dept: OBGYN | Facility: CLINIC | Age: 23
End: 2018-04-06

## 2018-04-06 NOTE — TELEPHONE ENCOUNTER
Notes Recorded by Jeny Schmitt C.N.M. on 4/5/2018 at 10:25 AM PDT  Abnormal 1hr GTT - needs 3hr asap.    Notified by Oskar Fermin. [facility]. States will do it on 4/9/18

## 2018-04-11 ENCOUNTER — HOSPITAL ENCOUNTER (OUTPATIENT)
Dept: LAB | Facility: MEDICAL CENTER | Age: 23
End: 2018-04-11
Attending: NURSE PRACTITIONER
Payer: MEDICAID

## 2018-04-11 DIAGNOSIS — R73.09 ABNORMAL GTT (GLUCOSE TOLERANCE TEST): ICD-10-CM

## 2018-04-11 LAB
GLUCOSE 1H P CHAL SERPL-MCNC: 143 MG/DL (ref 65–180)
GLUCOSE 2H P CHAL SERPL-MCNC: 141 MG/DL (ref 65–155)
GLUCOSE 3H P CHAL SERPL-MCNC: 81 MG/DL (ref 65–140)
GLUCOSE BS SERPL-MCNC: 72 MG/DL (ref 65–95)

## 2018-04-11 PROCEDURE — 36415 COLL VENOUS BLD VENIPUNCTURE: CPT

## 2018-04-11 PROCEDURE — 82951 GLUCOSE TOLERANCE TEST (GTT): CPT

## 2018-04-11 PROCEDURE — 82952 GTT-ADDED SAMPLES: CPT

## 2018-04-12 ENCOUNTER — TELEPHONE (OUTPATIENT)
Dept: OBGYN | Facility: CLINIC | Age: 23
End: 2018-04-12

## 2018-04-12 NOTE — TELEPHONE ENCOUNTER
Returning patient's call. Patient identifed with  and last name. Patient asking for 3 GTT results. Results have been signed. Patient notified they are normal

## 2018-04-19 ENCOUNTER — ROUTINE PRENATAL (OUTPATIENT)
Dept: OBGYN | Facility: CLINIC | Age: 23
End: 2018-04-19
Payer: MEDICAID

## 2018-04-19 VITALS — DIASTOLIC BLOOD PRESSURE: 52 MMHG | WEIGHT: 183 LBS | BODY MASS INDEX: 35.74 KG/M2 | SYSTOLIC BLOOD PRESSURE: 100 MMHG

## 2018-04-19 DIAGNOSIS — Z87.51 HISTORY OF PRETERM DELIVERY: ICD-10-CM

## 2018-04-19 DIAGNOSIS — N76.0 BV (BACTERIAL VAGINOSIS): ICD-10-CM

## 2018-04-19 DIAGNOSIS — B96.89 BV (BACTERIAL VAGINOSIS): ICD-10-CM

## 2018-04-19 DIAGNOSIS — R82.71 GBS BACTERIURIA: ICD-10-CM

## 2018-04-19 DIAGNOSIS — Z34.83 ENCOUNTER FOR SUPERVISION OF OTHER NORMAL PREGNANCY, THIRD TRIMESTER: Primary | ICD-10-CM

## 2018-04-19 PROCEDURE — 90040 PR PRENATAL FOLLOW UP: CPT | Performed by: NURSE PRACTITIONER

## 2018-04-19 NOTE — LETTER
"Count Your Baby's Movements  Another step to a healthy delivery    Marlysmargie Jane             Dept: [phone]    How Many Weeks Pregnant? 32W5D    Date to Begin Counting: Today 04/19/2018              How to use this chart    One way for your physician to keep track of your baby's health is by knowing how often the baby moves (or \"kicks\") in your womb.  You can help your physician to do this by using this chart every day.    Every day, you should see how many hours it takes for your baby to move 10 times.  Start in the morning, as soon as you get up.    · First, write down the time your baby moves until you get to 10.  · Check off one box every time your baby moves until you get to 10.  · Write down the time you finished counting in the last column.  · Total how long it took to count up all 10 movements.  · Finally, fill in the box that shows how long this took.  After counting 10 movements, you no longer have to count any more that day.  The next morning, just start counting again as soon as you get up.    What should you call a \"movement\"?  It is hard to say, because it will feel different from one mother to another and from one pregnancy to the next.  The important thing is that you count the movements the same way throughout your pregnancy.  If you have more questions, you should ask your physician.    Count carefully every day!  SAMPLE:  Week 28    How many hours did it take to feel 10 movements?       Start  Time     1     2     3     4     5     6     7     8     9     10   Finish Time   Mon 8:20 ·  ·  ·  ·  ·  ·  ·  ·  ·  ·  11:40   Tue Wed Thu Fri               Sat               Sun                 IMPORTANT: You should contact your physician if it takes more than two hours for you to feel 10 movements.  Each morning, write down the time and start to count the movements of your baby.  Keep track by checking off one box every time you feel one movement.  When you " "have felt 10 \"kicks\", write down the time you finished counting in the last column.  Then fill in the   box (over the check natalya) for the number of hours it took.  Be sure to read the complete instructions on the previous page.            "

## 2018-04-19 NOTE — PROGRESS NOTES
S:  Pt is  at 32w5d for routine OB follow up.  Reports some sciatic pain.  Reports good FM.  Denies VB, LOF, RUCs or vaginal DC.    O:  Please see above vitals.        FHTs: 155        Fundal ht: 33 cm.    A:  IUP at 32w5d  Patient Active Problem List    Diagnosis Date Noted   • Encounter for supervision of other normal pregnancy, third trimester 2018   • GBS bacteriuria 2018   • History of  delivery 2018   • BV (bacterial vaginosis) 2018        P:  1.  GBS @ 35 wks.          2.  Continue FKCs.          3.  Questions answered.          4.  Encouraged pt to tour L&D.          5.  Encourage adequate water intake.        6.  F/u 2 wks.        7.  Desires BTL - consent signed.        8.  D/w pt helps for sciatic pain.

## 2018-04-19 NOTE — PATIENT INSTRUCTIONS
P:  1.  GBS @ 35 wks.          2.  Continue FKCs.          3.  Questions answered.          4.  Encouraged pt to tour L&D.          5.  Encourage adequate water intake.        6.  F/u 2 wks.        7.  Desires BTL - consent signed.

## 2018-04-19 NOTE — PROGRESS NOTES
Pt here today for OB follow up  Reports +FM  WT: 183 lb  BP: 100/52  LYUBOV sheet given and explained today  Pt states no complaints today  Desires BTL. Consent to be signed today  Good # [phone]

## 2018-04-29 ENCOUNTER — HOSPITAL ENCOUNTER (OUTPATIENT)
Facility: MEDICAL CENTER | Age: 23
End: 2018-04-29
Attending: OBSTETRICS & GYNECOLOGY | Admitting: OBSTETRICS & GYNECOLOGY
Payer: MEDICAID

## 2018-04-29 VITALS — DIASTOLIC BLOOD PRESSURE: 65 MMHG | OXYGEN SATURATION: 96 % | SYSTOLIC BLOOD PRESSURE: 116 MMHG | HEART RATE: 100 BPM

## 2018-04-29 LAB
ALBUMIN SERPL BCP-MCNC: 3.7 G/DL (ref 3.2–4.9)
ALBUMIN/GLOB SERPL: 1.1 G/DL
ALP SERPL-CCNC: 106 U/L (ref 30–99)
ALT SERPL-CCNC: 10 U/L (ref 2–50)
ANION GAP SERPL CALC-SCNC: 7 MMOL/L (ref 0–11.9)
APPEARANCE UR: CLEAR
AST SERPL-CCNC: 14 U/L (ref 12–45)
BASOPHILS # BLD AUTO: 0.2 % (ref 0–1.8)
BASOPHILS # BLD: 0.02 K/UL (ref 0–0.12)
BILIRUB SERPL-MCNC: 0.4 MG/DL (ref 0.1–1.5)
BUN SERPL-MCNC: 7 MG/DL (ref 8–22)
CALCIUM SERPL-MCNC: 8.8 MG/DL (ref 8.5–10.5)
CHLORIDE SERPL-SCNC: 109 MMOL/L (ref 96–112)
CO2 SERPL-SCNC: 21 MMOL/L (ref 20–33)
COLOR UR AUTO: YELLOW
CREAT SERPL-MCNC: 0.61 MG/DL (ref 0.5–1.4)
EOSINOPHIL # BLD AUTO: 0.02 K/UL (ref 0–0.51)
EOSINOPHIL NFR BLD: 0.2 % (ref 0–6.9)
ERYTHROCYTE [DISTWIDTH] IN BLOOD BY AUTOMATED COUNT: 48.2 FL (ref 35.9–50)
GLOBULIN SER CALC-MCNC: 3.4 G/DL (ref 1.9–3.5)
GLUCOSE SERPL-MCNC: 104 MG/DL (ref 65–99)
GLUCOSE UR QL STRIP.AUTO: NEGATIVE MG/DL
HCT VFR BLD AUTO: 39.2 % (ref 37–47)
HGB BLD-MCNC: 12.6 G/DL (ref 12–16)
IMM GRANULOCYTES # BLD AUTO: 0.06 K/UL (ref 0–0.11)
IMM GRANULOCYTES NFR BLD AUTO: 0.5 % (ref 0–0.9)
KETONES UR QL STRIP.AUTO: NEGATIVE MG/DL
LEUKOCYTE ESTERASE UR QL STRIP.AUTO: ABNORMAL
LYMPHOCYTES # BLD AUTO: 2.3 K/UL (ref 1–4.8)
LYMPHOCYTES NFR BLD: 19.7 % (ref 22–41)
MCH RBC QN AUTO: 31.7 PG (ref 27–33)
MCHC RBC AUTO-ENTMCNC: 32.1 G/DL (ref 33.6–35)
MCV RBC AUTO: 98.5 FL (ref 81.4–97.8)
MONOCYTES # BLD AUTO: 0.77 K/UL (ref 0–0.85)
MONOCYTES NFR BLD AUTO: 6.6 % (ref 0–13.4)
NEUTROPHILS # BLD AUTO: 8.53 K/UL (ref 2–7.15)
NEUTROPHILS NFR BLD: 72.8 % (ref 44–72)
NITRITE UR QL STRIP.AUTO: NEGATIVE
NRBC # BLD AUTO: 0 K/UL
NRBC BLD-RTO: 0 /100 WBC
PH UR STRIP.AUTO: 6 [PH]
PLATELET # BLD AUTO: 247 K/UL (ref 164–446)
PMV BLD AUTO: 9.4 FL (ref 9–12.9)
POTASSIUM SERPL-SCNC: 4.1 MMOL/L (ref 3.6–5.5)
PROT SERPL-MCNC: 7.1 G/DL (ref 6–8.2)
PROT UR QL STRIP: NEGATIVE MG/DL
RBC # BLD AUTO: 3.98 M/UL (ref 4.2–5.4)
RBC UR QL AUTO: ABNORMAL
SODIUM SERPL-SCNC: 137 MMOL/L (ref 135–145)
SP GR UR: 1.01
WBC # BLD AUTO: 11.7 K/UL (ref 4.8–10.8)

## 2018-04-29 PROCEDURE — 59025 FETAL NON-STRESS TEST: CPT | Performed by: OBSTETRICS & GYNECOLOGY

## 2018-04-29 PROCEDURE — 36415 COLL VENOUS BLD VENIPUNCTURE: CPT

## 2018-04-29 PROCEDURE — 93010 ELECTROCARDIOGRAM REPORT: CPT | Performed by: INTERNAL MEDICINE

## 2018-04-29 PROCEDURE — 93005 ELECTROCARDIOGRAM TRACING: CPT | Performed by: STUDENT IN AN ORGANIZED HEALTH CARE EDUCATION/TRAINING PROGRAM

## 2018-04-29 PROCEDURE — 80053 COMPREHEN METABOLIC PANEL: CPT

## 2018-04-29 PROCEDURE — 81002 URINALYSIS NONAUTO W/O SCOPE: CPT

## 2018-04-29 PROCEDURE — 85025 COMPLETE CBC W/AUTO DIFF WBC: CPT

## 2018-04-29 NOTE — PROGRESS NOTES
[facility] LABOR AND DELIVERY TRIAGE PROGRESS NOTE    PATIENT ID:  NAME:  Marlys Jane  MRN:               [id number]  YOB: 1995     22 y.o. female  at 34w1d.    Subjective: Pt presenting after ground level fall onto hard surface around 1500 today.  She states that she felt hot and then woke up on the ground.  She denies any seizure like activity and states that she has never lost consciousness in the past.  She denies LOF, vaginal bleeding, abdominal pain/cramping/contractions, and decreased fetal movement.  She denies any recent illness and denies any complications with this pregnancy.  She states she does not have palpitations or an irregular HR, no chest pain, no SOB.     Pregnancy complicated by GBS bacteriuria     negative  For CTXS.   negative Feels pain   negative for LOF  negative for vaginal bleeding.   positive for fetal movement    ROS: Patient denies any fever chills, nausea, vomiting, headache, chest pain, shortness of breath, or dysuria or unusual swelling of hands or feet.     Objective:    There were no vitals filed for this visit.  No data recorded.    General: No acute distress, resting comfortably in bed.  HEENT: normocephalic, nontraumatic, PERRLA, EOMI  Cardiovascular: Heart RRR with no murmurs, rubs or gallops. Distal Pulses 2+  Respiratory: symmetric chest expansion, lungs CTAB, with no wheezes, rales, rhonci  Abdomen: gravid, nontender  Musculoskeletal: strength 5/5 in four extremities  Neuro: non focal with no numbness, tingling or changes in sensation    Cervix:  deferred  [place]: Infrequent uterine contractions on toco  FHRM: Baseline 140s, Accels to 150, no decels, moderate variability    Assessment: 22 y.o. female  at 34w1d with unprovoked syncopal event at 1500 today.  Possibly 2/2 hypoglycemia vs postural hypotension vs arrhythmia     Plan:   1. CBC, CMP, UA, EKG pending  2. Orthostatic vitals to assess for postural hypotension      Discussed case with    PARAM Gallego Attending. Case was discussed and attending agreed with plan prior to discharge of patient.

## 2018-04-30 ENCOUNTER — ROUTINE PRENATAL (OUTPATIENT)
Dept: OBGYN | Facility: CLINIC | Age: 23
End: 2018-04-30
Payer: MEDICAID

## 2018-04-30 VITALS — DIASTOLIC BLOOD PRESSURE: 64 MMHG | SYSTOLIC BLOOD PRESSURE: 108 MMHG | BODY MASS INDEX: 35.15 KG/M2 | WEIGHT: 180 LBS

## 2018-04-30 DIAGNOSIS — R82.71 GBS BACTERIURIA: ICD-10-CM

## 2018-04-30 DIAGNOSIS — Z87.51 HISTORY OF PRETERM DELIVERY: ICD-10-CM

## 2018-04-30 DIAGNOSIS — B96.89 BV (BACTERIAL VAGINOSIS): ICD-10-CM

## 2018-04-30 DIAGNOSIS — N76.0 BV (BACTERIAL VAGINOSIS): ICD-10-CM

## 2018-04-30 DIAGNOSIS — Z34.83 ENCOUNTER FOR SUPERVISION OF OTHER NORMAL PREGNANCY, THIRD TRIMESTER: ICD-10-CM

## 2018-04-30 LAB — EKG IMPRESSION: NORMAL

## 2018-04-30 PROCEDURE — 90040 PR PRENATAL FOLLOW UP: CPT | Performed by: NURSE PRACTITIONER

## 2018-04-30 NOTE — PROGRESS NOTES
1635-Dr Santana ordered orthostatic bps  1639-bps done, report given  1700-lab here for blood draw

## 2018-04-30 NOTE — PROGRESS NOTES
Progress Note    Subjective: A 22-year-old  at 34 weeks and 1/7 days.  Patient states that she was in her house today walking around when she felt really hot and sweaty and clammy. She then felt like her hearing and her vision was affected and she felt weak and the next thing she remembers she woke up laying on the floor. She had a headache following that, she came to the hospital for evaluation by the time she got here she was feeling generally well. She reports good fetal movement and no contractions vaginal bleeding or loss of fluid. The patient does not remember hitting her stomach, she does not have any visible signs of trauma she has no tenderness she has no contusions on her hands knees elbows head or anywhere else. She states she is feeling well now. She reports the only thing she ate today was 3 bowls of reason bran cereal    Objective Data:  Recent Labs      18   1657   WBC  11.7*   RBC  3.98*   HEMOGLOBIN  12.6   HEMATOCRIT  39.2   MCV  98.5*   MCH  31.7   MCHC  32.1*   RDW  48.2   PLATELETCT  247   MPV  9.4     Recent Labs      18   1657   SODIUM  137   POTASSIUM  4.1   CHLORIDE  109   CO2  21   GLUCOSE  104*   BUN  7*   CREATININE  0.61   CALCIUM  8.8       Vitals:    18 1752 18 1756 18 1757 18 1802   BP:       Pulse: (!) 115  (!) 110 100   SpO2: 96% 88% 95% 96%       Well-developed well-nourished female in no apparent distress  Heart tachycardic with 2/6 systolic ejection murmur  Abdomen is soft and nontender  Lungs are clear to auscultation  Extremities show no clubbing cyanosis or edema      Fetal heart tones 140s reactive no decelerations noted  [place] was irregular contractions  No current facility-administered medications for this encounter.        Assessment: IUP 34 weeks and one day with probable hypoglycemic episode given patient's poor diet also possibly dehydrated    Plan: Recommend by mouth hydration and discussed with patient appropriate intervals for  eating, recommend eating complex carbohydrates with proteins and fat to stabilize blood sugar  Follow-up again if she has heavy bleeding abdominal pain, another syncopal episode or decreased fetal movement

## 2018-04-30 NOTE — PROGRESS NOTES
G 6 P3  EDC 6-9 34.1 weeks pt is here with C/O passing out and falling on her face and abdomen at 1500. Pt felt warm and fell and does not remember how it happened.  Dr Santana was notified, in the room and orders received. Reactive strip   1800 DR Gallego in the room evaluated the pt , strip reviewed, labs reviewed and order received to discharge pt home. Instructions given and pt was discharge homein stable condition at 1815 with family. Pt understands the instructions.

## 2018-04-30 NOTE — PROGRESS NOTES
S) Pt is a 22 y.o.   at 34w2d  gestation. Routine prenatal care today. No complaints today. Was feeling off yesterday and had an episode where she passed out after feeling dizzy, lightheaded, ringing in the ears, sweaty, and clammy. Probable hypoglycemia. Lab work was done, all was normal. Education provided regarding small, frequent meals with protein and complex carbs.  labor precautions discussed, GBS next visit. All questions answered.    Fetal movement Normal  Cramping no  VB no  LOF no   Denies dysuria. Generally feels well today. Good self-care activities identified. Denies headaches, swelling, visual changes, or epigastric pain .     O) Blood pressure 108/64, weight 81.6 kg (180 lb), last menstrual period 2017.        Labs:       PNL: WNL       GCT: 143, 3 hr WNL       AFP: Normal       GBS: Positive in UA       Pertinent ultrasound -        3/12/18- Survey for cervical funneling- Survey WNL, cervical length 3.5cm, c/w prev dating.  3/12/18- Dr Avila survey- Survey WNL, BRAD WNL, c/w prev dating. No follow up indicated    A) IUP at 34w2d       S=D         Patient Active Problem List    Diagnosis Date Noted   • Encounter for supervision of other normal pregnancy, third trimester 2018   • GBS bacteriuria 2018   • History of  delivery 2018   • BV (bacterial vaginosis) 2018          SVE: deferred         TDAP: yes       FLU: yes        BTL: yes       : n/a       C/S Consent: n/a       IOL or C/S scheduled: no       LAST PAP: 18- Negative         P) s/s ptl vs general discomforts. Fetal movements reviewed. General ed and anticipatory guidance. Nutrition/exercise/vitamin. Plans breast Plans pp contraception- BTL.  Continue PNV.

## 2018-04-30 NOTE — PROGRESS NOTES
Pt here today for OB follow up  Reports +FM  WT: 180 lb  BP: 108/64  Pt states she passed out yesterday at home. States she was seen at L&D yesterday.  Pt states no complaints today  Good # [phone]

## 2018-05-02 ENCOUNTER — HOSPITAL ENCOUNTER (OUTPATIENT)
Facility: MEDICAL CENTER | Age: 23
End: 2018-05-02
Attending: OBSTETRICS & GYNECOLOGY | Admitting: OBSTETRICS & GYNECOLOGY
Payer: MEDICAID

## 2018-05-02 VITALS
BODY MASS INDEX: 35.34 KG/M2 | HEART RATE: 102 BPM | SYSTOLIC BLOOD PRESSURE: 110 MMHG | DIASTOLIC BLOOD PRESSURE: 69 MMHG | TEMPERATURE: 98.3 F | HEIGHT: 60 IN | WEIGHT: 180 LBS | RESPIRATION RATE: 18 BRPM

## 2018-05-02 LAB
APPEARANCE UR: ABNORMAL
COLOR UR AUTO: ABNORMAL
GLUCOSE BLD-MCNC: 103 MG/DL (ref 65–99)
GLUCOSE UR QL STRIP.AUTO: NEGATIVE MG/DL
KETONES UR QL STRIP.AUTO: ABNORMAL MG/DL
LEUKOCYTE ESTERASE UR QL STRIP.AUTO: ABNORMAL
NITRITE UR QL STRIP.AUTO: NEGATIVE
PH UR STRIP.AUTO: 5.5 [PH]
PROT UR QL STRIP: 30 MG/DL
RBC UR QL AUTO: ABNORMAL
SP GR UR: >=1.03

## 2018-05-02 PROCEDURE — 82962 GLUCOSE BLOOD TEST: CPT

## 2018-05-02 PROCEDURE — 96360 HYDRATION IV INFUSION INIT: CPT

## 2018-05-02 PROCEDURE — 81002 URINALYSIS NONAUTO W/O SCOPE: CPT

## 2018-05-02 PROCEDURE — 59025 FETAL NON-STRESS TEST: CPT | Performed by: OBSTETRICS & GYNECOLOGY

## 2018-05-02 PROCEDURE — 700105 HCHG RX REV CODE 258: Performed by: STUDENT IN AN ORGANIZED HEALTH CARE EDUCATION/TRAINING PROGRAM

## 2018-05-02 RX ORDER — SODIUM CHLORIDE, SODIUM LACTATE, POTASSIUM CHLORIDE, CALCIUM CHLORIDE 600; 310; 30; 20 MG/100ML; MG/100ML; MG/100ML; MG/100ML
INJECTION, SOLUTION INTRAVENOUS CONTINUOUS
Status: DISCONTINUED | OUTPATIENT
Start: 2018-05-02 | End: 2018-05-02 | Stop reason: HOSPADM

## 2018-05-02 RX ADMIN — SODIUM CHLORIDE, POTASSIUM CHLORIDE, SODIUM LACTATE AND CALCIUM CHLORIDE: 600; 310; 30; 20 INJECTION, SOLUTION INTRAVENOUS at 11:10

## 2018-05-02 NOTE — PROGRESS NOTES
[facility] LABOR AND DELIVERY TRIAGE PROGRESS NOTE    PATIENT ID:  NAME:  Marlys Jane  MRN:               [id number]  YOB: 1995     22 y.o. female  at 34w4d.    Subjective: Pt with weakness, spotty vision, and generally not feeling well for the last several days.  She was seen and evaluated for syncope with all labs and EKG WNL.  She states she has been trying to drink as much water as she can during the day and has been eating regular meals including fruit and a muffin this morning for breakfast.  She denies cough, SOB, headache, chest pain, abdominal pain, and admits to some recent diarrhea over the last several days.    Pregnancy complicated by none    negative  For CTXS.   negative Feels pain   negative for LOF  negative for vaginal bleeding.   positive for fetal movement    ROS: Patient denies any fever chills, nausea, vomiting, headache, chest pain, shortness of breath, or dysuria or unusual swelling of hands or feet.     Objective:    Vitals:    18 1023 18 1034   BP:  109/63   Pulse:  91   Resp:  18   Temp:  36.8 °C (98.3 °F)   TempSrc:  Temporal   Weight: 81.6 kg (180 lb)    Height: 1.524 m (5')      Temp (24hrs), Av.8 °C (98.3 °F), Min:36.8 °C (98.3 °F), Max:36.8 °C (98.3 °F)    General: No acute distress, resting comfortably in bed.  HEENT: normocephalic, nontraumatic, PERRLA, EOMI  Cardiovascular: Heart RRR with no murmurs, rubs or gallops. Distal Pulses 2+  Respiratory: symmetric chest expansion, lungs CTAB, with no wheezes, rales, rhonci  Abdomen: gravid, nontender  Musculoskeletal: strength 5/5 in four extremities  Neuro: non focal with no numbness, tingling or changes in sensation    Cervix:  deferred  [place]: Uterine Contractions not noted on toco  FHRM: Baseline 140, Accels to 150, no decels, moderate variability    Assessment: 22 y.o. female  at 34w4d with generalized weakness and vision changes likely 2/2 dehydration.    Plan:   -1L fluid bolus  - fingerstick  blood glucose check  - UA      Discussed case with PARAM Benson Attending. Case was discussed and attending agreed with plan prior to discharge of patient.

## 2018-05-02 NOTE — PROGRESS NOTES
, SALVATORE: , 34.4 weeks  Pt presents to L&D with c/o of black spots in her vision, weakness in her legs, and fatigue.  Pt states she felt fine yesterday and this morning is when symptoms started. Pt states she ate a banana & walnut muffin this morning as well as a yogurt with granola and emily. Pt reports adequate hydration. Pt denies LOF, VB, +FM. EFM/[place] placed. VSS. Afebrile.  1040: Dr. Santana notified.  1100: Orders received to start a PIV, give a liter of LR, and obtain a glucose level.  1110: PIV started. Glucose 103. Pt given large pitcher of ice water.  1215: Pt feeling better and okay to go home. Pt educated on adequate hydration and eating small frequent meals. Pt verbalized understanding. MD updated. Orders received for discharge.  1220: Pt discharged home via ambulation. General L&D instructions given. Pt verbalized understanding.

## 2018-05-07 ENCOUNTER — TELEPHONE (OUTPATIENT)
Dept: OBGYN | Facility: CLINIC | Age: 23
End: 2018-05-07

## 2018-05-07 ENCOUNTER — HOSPITAL ENCOUNTER (OUTPATIENT)
Facility: MEDICAL CENTER | Age: 23
End: 2018-05-07
Attending: OBSTETRICS & GYNECOLOGY | Admitting: OBSTETRICS & GYNECOLOGY
Payer: MEDICAID

## 2018-05-07 VITALS
HEIGHT: 60 IN | HEART RATE: 79 BPM | WEIGHT: 180 LBS | SYSTOLIC BLOOD PRESSURE: 115 MMHG | DIASTOLIC BLOOD PRESSURE: 57 MMHG | BODY MASS INDEX: 35.34 KG/M2 | TEMPERATURE: 97.6 F

## 2018-05-07 PROCEDURE — 59025 FETAL NON-STRESS TEST: CPT | Performed by: OBSTETRICS & GYNECOLOGY

## 2018-05-07 PROCEDURE — 96372 THER/PROPH/DIAG INJ SC/IM: CPT

## 2018-05-07 PROCEDURE — 700111 HCHG RX REV CODE 636 W/ 250 OVERRIDE (IP): Performed by: OBSTETRICS & GYNECOLOGY

## 2018-05-07 RX ORDER — BETAMETHASONE SODIUM PHOSPHATE AND BETAMETHASONE ACETATE 3; 3 MG/ML; MG/ML
12 INJECTION, SUSPENSION INTRA-ARTICULAR; INTRALESIONAL; INTRAMUSCULAR; SOFT TISSUE EVERY 24 HOURS
Status: DISCONTINUED | OUTPATIENT
Start: 2018-05-07 | End: 2018-05-07 | Stop reason: HOSPADM

## 2018-05-07 RX ADMIN — BETAMETHASONE SODIUM PHOSPHATE AND BETAMETHASONE ACETATE 12 MG: 3; 3 INJECTION, SUSPENSION INTRA-ARTICULAR; INTRALESIONAL; INTRAMUSCULAR at 11:08

## 2018-05-07 NOTE — PROGRESS NOTES
" EDC-  EGA- 35.2    3350- Pt arrived to L&D with c/o an episode of VB this morning when she woke up.  Pt reports she had a \"gush\" of VB, followed by light spotting along with cramping.  Pt reports this also happened 2 days ago.  Denies current VB or need to use a pad, reports she always has mild cramping.  EFM/TOCO applied, VSS.  SVE- /ballotable.  Small amount of old dark blood noted on glove.    1035- Report to Dr. Gallego via phone, orders received to discharge pt home with labor precautions after administration of betamethasone.  Discussed steroid injection and follow up in 24 hours for second dose as well as labor precautions and kick counts with pt, verbalizes understanding.    1108- Betamethasone given.  1112- Pt discharged home ambulatory in stable condition with FOB at side.  "

## 2018-05-07 NOTE — TELEPHONE ENCOUNTER
Pt called today 05/07/2018  8:51 am  States that this morning she woke up and was bleeding a lot like a period. And is experiencing a little bit of cramping.  Pt denies having intercourse and contractions     Sintia Corrales D.N.P   Advised to go to the hospital  Pt was informed and verbalized understanding. Not further questions

## 2018-05-08 ENCOUNTER — HOSPITAL ENCOUNTER (OUTPATIENT)
Facility: MEDICAL CENTER | Age: 23
End: 2018-05-08
Attending: OBSTETRICS & GYNECOLOGY | Admitting: OBSTETRICS & GYNECOLOGY
Payer: MEDICAID

## 2018-05-08 VITALS
TEMPERATURE: 98 F | RESPIRATION RATE: 18 BRPM | DIASTOLIC BLOOD PRESSURE: 66 MMHG | SYSTOLIC BLOOD PRESSURE: 106 MMHG | HEART RATE: 80 BPM

## 2018-05-08 PROCEDURE — 96372 THER/PROPH/DIAG INJ SC/IM: CPT

## 2018-05-08 PROCEDURE — 59025 FETAL NON-STRESS TEST: CPT | Performed by: OBSTETRICS & GYNECOLOGY

## 2018-05-08 PROCEDURE — 700111 HCHG RX REV CODE 636 W/ 250 OVERRIDE (IP): Performed by: OBSTETRICS & GYNECOLOGY

## 2018-05-08 RX ORDER — BETAMETHASONE SODIUM PHOSPHATE AND BETAMETHASONE ACETATE 3; 3 MG/ML; MG/ML
12 INJECTION, SUSPENSION INTRA-ARTICULAR; INTRALESIONAL; INTRAMUSCULAR; SOFT TISSUE EVERY 24 HOURS
Status: COMPLETED | OUTPATIENT
Start: 2018-05-08 | End: 2018-05-08

## 2018-05-08 RX ADMIN — BETAMETHASONE SODIUM PHOSPHATE AND BETAMETHASONE ACETATE 12 MG: 3; 3 INJECTION, SUSPENSION INTRA-ARTICULAR; INTRALESIONAL; INTRAMUSCULAR at 11:43

## 2018-05-15 ENCOUNTER — ROUTINE PRENATAL (OUTPATIENT)
Dept: OBGYN | Facility: CLINIC | Age: 23
End: 2018-05-15
Payer: MEDICAID

## 2018-05-15 VITALS — DIASTOLIC BLOOD PRESSURE: 64 MMHG | BODY MASS INDEX: 35.74 KG/M2 | SYSTOLIC BLOOD PRESSURE: 108 MMHG | WEIGHT: 183 LBS

## 2018-05-15 DIAGNOSIS — N76.0 BV (BACTERIAL VAGINOSIS): ICD-10-CM

## 2018-05-15 DIAGNOSIS — B96.89 BV (BACTERIAL VAGINOSIS): ICD-10-CM

## 2018-05-15 DIAGNOSIS — Z87.51 HISTORY OF PRETERM DELIVERY: ICD-10-CM

## 2018-05-15 DIAGNOSIS — Z34.83 ENCOUNTER FOR SUPERVISION OF OTHER NORMAL PREGNANCY, THIRD TRIMESTER: Primary | ICD-10-CM

## 2018-05-15 DIAGNOSIS — R82.71 GBS BACTERIURIA: ICD-10-CM

## 2018-05-15 PROCEDURE — 90040 PR PRENATAL FOLLOW UP: CPT | Performed by: NURSE PRACTITIONER

## 2018-05-15 NOTE — PROGRESS NOTES
OB f/u. + fetal movement.  No VB, LOF or UC's.  Wt: 183lb      BP: 108/64  Good phone # [phone]  Preferred pharmacy confirmed.  (+) GBS in urine

## 2018-05-15 NOTE — PROGRESS NOTES
S:  Pt is  at 36w3d here for routine OB follow up.  Reports an occ UC.  Reports good FM.  Denies VB, LOF, RUCs, or vaginal DC.     O:  Please see above vitals.        FHTs: 150        Fundal ht: 36        Fetal position: vertex        SVE: deferred        GBS pos bacturia  -- reviewed w pt.      A:  IUP at 36w3d  Patient Active Problem List    Diagnosis Date Noted   • Encounter for supervision of other normal pregnancy, third trimester 2018   • GBS bacteriuria 2018   • History of  delivery 2018   • BV (bacterial vaginosis) 2018       P:  1.  Continue FKCs.         2.  Labor precautions given.  Instructions given on where to go.  Pt receptive to education.         3.  Reviewed GBS status w pt.       4.  Questions answered.         5.  Encouraged adequate water intake       6.  F/u 1wk

## 2018-05-18 ENCOUNTER — TELEPHONE (OUTPATIENT)
Dept: OBGYN | Facility: CLINIC | Age: 23
End: 2018-05-18

## 2018-05-18 NOTE — TELEPHONE ENCOUNTER
Pt called triage line stating she has had really bad lower back pain, pressure in her pelvic area and UC's q 10 min apart.  Consulted with Dr. Gallego and she advised pt the labor precautions of UC's every 3-5 min apart, any LOF, Bleeding to go to L&D. Stay well hydrated. Pt was concerned as she has had to  babies in past. I let pt know that she is full term and can deliver at any time. Pt had no further questions.

## 2018-05-20 ENCOUNTER — HOSPITAL ENCOUNTER (OUTPATIENT)
Facility: MEDICAL CENTER | Age: 23
End: 2018-05-20
Attending: OBSTETRICS & GYNECOLOGY | Admitting: OBSTETRICS & GYNECOLOGY
Payer: MEDICAID

## 2018-05-20 VITALS
WEIGHT: 180 LBS | DIASTOLIC BLOOD PRESSURE: 68 MMHG | BODY MASS INDEX: 35.34 KG/M2 | SYSTOLIC BLOOD PRESSURE: 127 MMHG | RESPIRATION RATE: 18 BRPM | TEMPERATURE: 99 F | HEART RATE: 97 BPM | HEIGHT: 60 IN

## 2018-05-20 PROCEDURE — 59025 FETAL NON-STRESS TEST: CPT | Performed by: OBSTETRICS & GYNECOLOGY

## 2018-05-20 NOTE — PROGRESS NOTES
Pt here with C/O irregular contractions. She report she has a Hx of  deliveries and wasn't sure if she is in labor. She states +FM, -LOF, -VB. Efm/[place] placed. VSS. SVE done. [facility] CN updated on pt's C/O  0140- No cervical change from previous exam  0153- [facility] CNM updated. And orders received to discharge home. Pt given Labor precautions and sent home walking with spouse

## 2018-05-22 ENCOUNTER — ROUTINE PRENATAL (OUTPATIENT)
Dept: OBGYN | Facility: CLINIC | Age: 23
End: 2018-05-22
Payer: MEDICAID

## 2018-05-22 VITALS — BODY MASS INDEX: 35.74 KG/M2 | SYSTOLIC BLOOD PRESSURE: 108 MMHG | WEIGHT: 183 LBS | DIASTOLIC BLOOD PRESSURE: 62 MMHG

## 2018-05-22 DIAGNOSIS — O36.8130 DECREASED FETAL MOVEMENT AFFECTING MANAGEMENT OF PREGNANCY IN THIRD TRIMESTER, SINGLE OR UNSPECIFIED FETUS: ICD-10-CM

## 2018-05-22 DIAGNOSIS — Z34.83 ENCOUNTER FOR SUPERVISION OF OTHER NORMAL PREGNANCY, THIRD TRIMESTER: Primary | ICD-10-CM

## 2018-05-22 DIAGNOSIS — Z87.51 HISTORY OF PRETERM DELIVERY: ICD-10-CM

## 2018-05-22 DIAGNOSIS — R82.71 GBS BACTERIURIA: ICD-10-CM

## 2018-05-22 LAB
NST ACOUSTIC STIMULATION: NORMAL
NST ACTION NECESSARY: NORMAL
NST ASSESSMENT: NORMAL
NST BASELINE: 140
NST INDICATIONS: NORMAL
NST OTHER DATA: NORMAL
NST READ BY: NORMAL
NST RETURN: NORMAL
NST UTERINE ACTIVITY: NORMAL

## 2018-05-22 PROCEDURE — 90040 PR PRENATAL FOLLOW UP: CPT | Performed by: NURSE PRACTITIONER

## 2018-05-22 PROCEDURE — 59025 FETAL NON-STRESS TEST: CPT | Performed by: NURSE PRACTITIONER

## 2018-05-22 NOTE — PROGRESS NOTES
S: Pt is a 22 y.o.  at 37w3d with  Estimated Date of Delivery: 18 who presents for scheduled NST for decreased FM. No complaints.  Denies VB, RUCs, LOF.  Reports good FM now.      O: LMP 2017          FHTs: baseline 140, accels positive,  decels negative,  Variability moderate       [place]: Some irregular UCs           A/P  Patient Active Problem List    Diagnosis Date Noted   • Encounter for supervision of other normal pregnancy, third trimester 2018   • GBS bacteriuria 2018   • History of  delivery 2018   • BV (bacterial vaginosis) 2018       1.  IUP @ 37w3d  2.  Reactive, category 1 NST.  3.  F/u as sched.

## 2018-05-22 NOTE — PROGRESS NOTES
Pt here today for OB follow up  Pt states no complaints   Reports -FM, pt states has not been getting 10 kicks for the last two days.  Good # [phone]  Pharmacy Confirmed.

## 2018-05-22 NOTE — PROGRESS NOTES
S) Pt is a 22 y.o.   at 37w3d  gestation. Routine prenatal care today. Complains of decreased fetal movement x 2 days, and has not met FKC's x 2 days. Discussed that she needs to call or follow up in hospital if baby is not meeting FKC's. Will do NST today. Labor precautions reviewed, and all questions answered.    Fetal movement Normal  Cramping no  VB no  LOF no   Denies dysuria. Generally feels well today. Good self-care activities identified. Denies headaches, swelling, visual changes, or epigastric pain .     O) Blood pressure 108/62, weight 83 kg (183 lb), last menstrual period 2017.        Labs:       PNL: WNL       GCT: 143, 3 hr WNL       AFP: normal       GBS: positive in UA       Pertinent ultrasound -        3/12/18- Dr Avila US- Survey WNL, BRAD WNL, c/w prev dating.  3/12/18- Check cervical funneling- survey WNL, BRAD 14.8cm, cervix is 3.5cm, c/w prev dating.    A) IUP at 37w3d       S=D         Patient Active Problem List    Diagnosis Date Noted   • Encounter for supervision of other normal pregnancy, third trimester 2018   • GBS bacteriuria 2018   • History of  delivery 2018   • BV (bacterial vaginosis) 2018          SVE: deferred         TDAP: yes       FLU: no        BTL: yes       : n/a       C/S Consent: n/a       IOL or C/S scheduled: no       LAST PAP: 18- Negative         P) s/s ptl vs general discomforts. Fetal movements reviewed. General ed and anticipatory guidance. Nutrition/exercise/vitamin. Plans breast Plans pp contraception- unsure  Continue PNV.

## 2018-05-26 ENCOUNTER — HOSPITAL ENCOUNTER (OUTPATIENT)
Facility: MEDICAL CENTER | Age: 23
End: 2018-05-27
Attending: OBSTETRICS & GYNECOLOGY | Admitting: OBSTETRICS & GYNECOLOGY
Payer: MEDICAID

## 2018-05-26 PROCEDURE — 59025 FETAL NON-STRESS TEST: CPT | Performed by: NURSE PRACTITIONER

## 2018-05-26 PROCEDURE — 81002 URINALYSIS NONAUTO W/O SCOPE: CPT

## 2018-05-26 PROCEDURE — 84112 EVAL AMNIOTIC FLUID PROTEIN: CPT

## 2018-05-27 VITALS
HEIGHT: 60 IN | WEIGHT: 180 LBS | DIASTOLIC BLOOD PRESSURE: 63 MMHG | SYSTOLIC BLOOD PRESSURE: 125 MMHG | BODY MASS INDEX: 35.34 KG/M2 | TEMPERATURE: 98.2 F | HEART RATE: 96 BPM

## 2018-05-27 LAB — A1 MICROGLOB PLACENTAL VAG QL: NEGATIVE

## 2018-05-27 NOTE — PROGRESS NOTES
2230 pt presents to floor.  SALVATORE 18 and GA 38.0. Pt states 'I came in because I felt some leaking a couple hours ago that soaked through my underwear and when I wiped it was pink-tinged and there were some blood clots'. Pt has hx of PTL at 32 weeks with second child. 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 Pt reports experiencing irregular cxns starting at 4 months of pregnancy. Pt reports positive FM. Mother of pt at bedside.    2250 CNM notified of pt. Urine sample assessed- large blood, large leukocytes, and trace protein. Amnisure and SVE ordered.    2325 sterile speculum placed. No LOF noted. Amnisure collected. SVE 2cm.     0000 amnisure negative. Orders to DC pt.    0015 discharge instructions given to pt. All questions answered- pt denies any further needs at this time.     0020 pt stable and alert. OFF floor w/ all posessions and mother.

## 2018-05-28 LAB
APPEARANCE UR: CLEAR
COLOR UR AUTO: YELLOW
GLUCOSE UR QL STRIP.AUTO: NEGATIVE MG/DL
KETONES UR QL STRIP.AUTO: NEGATIVE MG/DL
LEUKOCYTE ESTERASE UR QL STRIP.AUTO: ABNORMAL
NITRITE UR QL STRIP.AUTO: NEGATIVE
PH UR STRIP.AUTO: 6 [PH]
PROT UR QL STRIP: ABNORMAL MG/DL
RBC UR QL AUTO: ABNORMAL
SP GR UR: >=1.03

## 2018-05-29 ENCOUNTER — ROUTINE PRENATAL (OUTPATIENT)
Dept: OBGYN | Facility: CLINIC | Age: 23
End: 2018-05-29
Payer: MEDICAID

## 2018-05-29 VITALS — WEIGHT: 187 LBS | SYSTOLIC BLOOD PRESSURE: 100 MMHG | BODY MASS INDEX: 36.52 KG/M2 | DIASTOLIC BLOOD PRESSURE: 68 MMHG

## 2018-05-29 DIAGNOSIS — Z87.51 HISTORY OF PRETERM DELIVERY: ICD-10-CM

## 2018-05-29 DIAGNOSIS — R82.71 GBS BACTERIURIA: ICD-10-CM

## 2018-05-29 DIAGNOSIS — N76.0 BV (BACTERIAL VAGINOSIS): ICD-10-CM

## 2018-05-29 DIAGNOSIS — Z34.83 ENCOUNTER FOR SUPERVISION OF OTHER NORMAL PREGNANCY, THIRD TRIMESTER: Primary | ICD-10-CM

## 2018-05-29 DIAGNOSIS — B96.89 BV (BACTERIAL VAGINOSIS): ICD-10-CM

## 2018-05-29 PROCEDURE — 90040 PR PRENATAL FOLLOW UP: CPT | Performed by: NURSE PRACTITIONER

## 2018-05-29 NOTE — PROGRESS NOTES
S:  Pt is  at 38w3d here for routine OB follow up.  Reports some back pain - an occ UC, but nothing regular yet.  Reports good FM.  Denies VB, LOF, RUCs, or vaginal DC.     O:  Please see above vitals.        FHTs: 145        Fundal ht: 36        Fetal position: vertex        SVE: /-3        GBS pos in urine.      A:  IUP at 38w3d  Patient Active Problem List    Diagnosis Date Noted   • Encounter for supervision of other normal pregnancy, third trimester 2018   • GBS bacteriuria 2018   • History of  delivery 2018   • BV (bacterial vaginosis) 2018       P:  1.  Continue FKCs.         2.  Labor precautions given.  Instructions given on where to go.  Pt receptive to education.         3.  D/w pt IOL policy.  IOL referral placed.        4.  Questions answered.         5.  Encouraged adequate water intake       6.  F/u 1wk

## 2018-05-29 NOTE — PROGRESS NOTES
Pt here today for OB follow up  Pt states having lower abdominal and back pain  Reports +  Good # [phone]  Pharmacy Confirmed.

## 2018-05-30 ENCOUNTER — TELEPHONE (OUTPATIENT)
Dept: OBGYN | Facility: CLINIC | Age: 23
End: 2018-05-30

## 2018-05-30 NOTE — TELEPHONE ENCOUNTER
Pt C/O states is having some spotting w/urination, denies and pain, burning or other complications. Wants to know if she needs to be concerned.  Labor precautions given and go to L&D if she needs to  and probable the spotting was due to cervical check a day before.  Verbalized understanding

## 2018-06-05 ENCOUNTER — HOSPITAL ENCOUNTER (OUTPATIENT)
Facility: MEDICAL CENTER | Age: 23
End: 2018-06-05
Attending: OBSTETRICS & GYNECOLOGY | Admitting: OBSTETRICS & GYNECOLOGY
Payer: MEDICAID

## 2018-06-05 VITALS — BODY MASS INDEX: 36.71 KG/M2 | HEIGHT: 60 IN | WEIGHT: 187 LBS

## 2018-06-05 PROCEDURE — 59025 FETAL NON-STRESS TEST: CPT | Performed by: OBSTETRICS & GYNECOLOGY

## 2018-06-06 ENCOUNTER — ROUTINE PRENATAL (OUTPATIENT)
Dept: OBGYN | Facility: CLINIC | Age: 23
End: 2018-06-06
Payer: MEDICAID

## 2018-06-06 VITALS — WEIGHT: 189 LBS | BODY MASS INDEX: 36.91 KG/M2 | DIASTOLIC BLOOD PRESSURE: 64 MMHG | SYSTOLIC BLOOD PRESSURE: 104 MMHG

## 2018-06-06 DIAGNOSIS — R82.71 GBS BACTERIURIA: ICD-10-CM

## 2018-06-06 DIAGNOSIS — Z34.83 ENCOUNTER FOR SUPERVISION OF OTHER NORMAL PREGNANCY, THIRD TRIMESTER: ICD-10-CM

## 2018-06-06 DIAGNOSIS — Z87.51 HISTORY OF PRETERM DELIVERY: ICD-10-CM

## 2018-06-06 PROCEDURE — 90040 PR PRENATAL FOLLOW UP: CPT | Performed by: NURSE PRACTITIONER

## 2018-06-06 NOTE — PROGRESS NOTES
SUBJECTIVE:  Pt is a 22 y.o.   at 39w4d  gestation. Presents today for follow-up prenatal care. Reports no issues at this time.  Reports good fetal movement. Denies cramping/contractions, bleeding or leaking of fluid. Denies dysuria, headaches, N/V, or other issues at this time. Generally feels well today. Reports passing stone recently; pain on left side related to kidneys.     OBJECTIVE:  - See prenatal vitals flow  -   Vitals:    18 1429   Weight: 85.7 kg (189 lb)                 ASSESSMENT:   - IUP at 39w4d    - S=D   -   Patient Active Problem List    Diagnosis Date Noted   • Encounter for supervision of other normal pregnancy, third trimester 2018   • GBS bacteriuria 2018   • History of  delivery 2018         PLAN:  - S/sx pregnancy and labor warning signs vs general discomforts discussed  - Fetal movements and kick counts reviewed   - Adequate hydration reinforced  - Nutrition/exercise/vitamin education; continued PNV  - Plans for formulafeeding:handout given and reviewed   - Plans BTL for contraception Pp: handout given and reviewed  - Anticipatory guidance given  - Pt is desiring to move up induction because of her kidney stone pain and miserable in pregnancy; no openings but Miguel Angel will call with any openings   - RTC in 1 weeks for follow-up prenatal care

## 2018-06-06 NOTE — PROGRESS NOTES
39.3 weeks pt is here with pain that radiates from her back left side only to front of her abdomen.  External monitors applied. When pt was having the pain, abdomen was palpated and there was a contraction. Pt feels those Q15 min. Reactive strip, Dr Gallego was notified and gave report.    Report given to Kevin GUERRERO.

## 2018-06-06 NOTE — PROGRESS NOTES
1900 Report received from Noreen GUERRERO, POC discussed    1930 Orders for discharge received.     1932 RN at bedside, labor precautions given and all questions answered.     1940 Pt discharged home in stable condition

## 2018-06-06 NOTE — PROGRESS NOTES
Pt here today for OB follow up  Pt states no complaints   Reports +  Good # [phone]  Pharmacy Confirmed.  Pt scheduled for IOL on 06/16/2018 @ 0800 am

## 2018-06-13 ENCOUNTER — ROUTINE PRENATAL (OUTPATIENT)
Dept: OBGYN | Facility: CLINIC | Age: 23
End: 2018-06-13
Payer: MEDICAID

## 2018-06-13 VITALS — WEIGHT: 189 LBS | SYSTOLIC BLOOD PRESSURE: 110 MMHG | DIASTOLIC BLOOD PRESSURE: 62 MMHG | BODY MASS INDEX: 36.91 KG/M2

## 2018-06-13 DIAGNOSIS — Z87.51 HISTORY OF PRETERM DELIVERY: ICD-10-CM

## 2018-06-13 DIAGNOSIS — Z34.83 ENCOUNTER FOR SUPERVISION OF OTHER NORMAL PREGNANCY, THIRD TRIMESTER: ICD-10-CM

## 2018-06-13 DIAGNOSIS — R82.71 GBS BACTERIURIA: ICD-10-CM

## 2018-06-13 PROCEDURE — 90040 PR PRENATAL FOLLOW UP: CPT | Performed by: OBSTETRICS & GYNECOLOGY

## 2018-06-13 NOTE — PROGRESS NOTES
22 y.o.  40w4d The patient is here for routine obstetrical followup. She reports good fetal movement. She denies contractions, vaginal bleeding, or leaking of fluid.    The patient's pregnancy is complicated by   Patient Active Problem List    Diagnosis Date Noted   • Encounter for supervision of other normal pregnancy, third trimester 2018   • GBS bacteriuria 2018   • History of  delivery 2018     Scheduled for IOL 18  GBS +, will need abx in labor.     Labor precautions were discussed with patient  Fetal kick counts were discussed with patient

## 2018-06-13 NOTE — PROGRESS NOTES
OB f/u. + fetal movement.  No VB, LOF or UC's.  Good phone # [phone]  Preferred pharmacy confirmed.

## 2018-06-14 ENCOUNTER — HOSPITAL ENCOUNTER (INPATIENT)
Facility: MEDICAL CENTER | Age: 23
LOS: 2 days | End: 2018-06-16
Attending: OBSTETRICS & GYNECOLOGY | Admitting: OBSTETRICS & GYNECOLOGY
Payer: MEDICAID

## 2018-06-14 LAB
BASOPHILS # BLD AUTO: 0.2 % (ref 0–1.8)
BASOPHILS # BLD: 0.02 K/UL (ref 0–0.12)
COMMENT 1642: NORMAL
EOSINOPHIL # BLD AUTO: 0.04 K/UL (ref 0–0.51)
EOSINOPHIL NFR BLD: 0.4 % (ref 0–6.9)
ERYTHROCYTE [DISTWIDTH] IN BLOOD BY AUTOMATED COUNT: 46.5 FL (ref 35.9–50)
HCT VFR BLD AUTO: 38.9 % (ref 37–47)
HGB BLD-MCNC: 13.2 G/DL (ref 12–16)
HOLDING TUBE BB 8507: NORMAL
IMM GRANULOCYTES # BLD AUTO: 0.07 K/UL (ref 0–0.11)
IMM GRANULOCYTES NFR BLD AUTO: 0.7 % (ref 0–0.9)
LYMPHOCYTES # BLD AUTO: 2.3 K/UL (ref 1–4.8)
LYMPHOCYTES NFR BLD: 21.7 % (ref 22–41)
MCH RBC QN AUTO: 32.5 PG (ref 27–33)
MCHC RBC AUTO-ENTMCNC: 33.9 G/DL (ref 33.6–35)
MCV RBC AUTO: 95.8 FL (ref 81.4–97.8)
MONOCYTES # BLD AUTO: 0.68 K/UL (ref 0–0.85)
MONOCYTES NFR BLD AUTO: 6.4 % (ref 0–13.4)
MORPHOLOGY BLD-IMP: NORMAL
NEUTROPHILS # BLD AUTO: 7.48 K/UL (ref 2–7.15)
NEUTROPHILS NFR BLD: 70.6 % (ref 44–72)
NRBC # BLD AUTO: 0 K/UL
NRBC BLD-RTO: 0 /100 WBC
PLATELET # BLD AUTO: 216 K/UL (ref 164–446)
PLATELET BLD QL SMEAR: NORMAL
PMV BLD AUTO: 10.6 FL (ref 9–12.9)
RBC # BLD AUTO: 4.06 M/UL (ref 4.2–5.4)
RBC BLD AUTO: NORMAL
WBC # BLD AUTO: 10.6 K/UL (ref 4.8–10.8)

## 2018-06-14 PROCEDURE — 700111 HCHG RX REV CODE 636 W/ 250 OVERRIDE (IP): Performed by: STUDENT IN AN ORGANIZED HEALTH CARE EDUCATION/TRAINING PROGRAM

## 2018-06-14 PROCEDURE — 36415 COLL VENOUS BLD VENIPUNCTURE: CPT

## 2018-06-14 PROCEDURE — 85025 COMPLETE CBC W/AUTO DIFF WBC: CPT

## 2018-06-14 PROCEDURE — 700101 HCHG RX REV CODE 250

## 2018-06-14 PROCEDURE — 700111 HCHG RX REV CODE 636 W/ 250 OVERRIDE (IP): Performed by: OBSTETRICS & GYNECOLOGY

## 2018-06-14 PROCEDURE — 700105 HCHG RX REV CODE 258: Performed by: OBSTETRICS & GYNECOLOGY

## 2018-06-14 PROCEDURE — 59409 OBSTETRICAL CARE: CPT

## 2018-06-14 PROCEDURE — 303615 HCHG EPIDURAL/SPINAL ANESTHESIA FOR LABOR

## 2018-06-14 PROCEDURE — 700102 HCHG RX REV CODE 250 W/ 637 OVERRIDE(OP): Performed by: OBSTETRICS & GYNECOLOGY

## 2018-06-14 PROCEDURE — 770002 HCHG ROOM/CARE - OB PRIVATE (112)

## 2018-06-14 PROCEDURE — A9270 NON-COVERED ITEM OR SERVICE: HCPCS | Performed by: OBSTETRICS & GYNECOLOGY

## 2018-06-14 PROCEDURE — 304965 HCHG RECOVERY SERVICES

## 2018-06-14 PROCEDURE — 700111 HCHG RX REV CODE 636 W/ 250 OVERRIDE (IP)

## 2018-06-14 PROCEDURE — 0HQ9XZZ REPAIR PERINEUM SKIN, EXTERNAL APPROACH: ICD-10-PCS | Performed by: OBSTETRICS & GYNECOLOGY

## 2018-06-14 RX ORDER — ONDANSETRON 2 MG/ML
4 INJECTION INTRAMUSCULAR; INTRAVENOUS EVERY 6 HOURS PRN
Status: DISCONTINUED | OUTPATIENT
Start: 2018-06-14 | End: 2018-06-16 | Stop reason: HOSPADM

## 2018-06-14 RX ORDER — DEXTROSE, SODIUM CHLORIDE, SODIUM LACTATE, POTASSIUM CHLORIDE, AND CALCIUM CHLORIDE 5; .6; .31; .03; .02 G/100ML; G/100ML; G/100ML; G/100ML; G/100ML
INJECTION, SOLUTION INTRAVENOUS CONTINUOUS
Status: DISCONTINUED | OUTPATIENT
Start: 2018-06-14 | End: 2018-06-14 | Stop reason: HOSPADM

## 2018-06-14 RX ORDER — AMPICILLIN 2 G/1
2 INJECTION, POWDER, FOR SOLUTION INTRAVENOUS ONCE
Status: COMPLETED | OUTPATIENT
Start: 2018-06-14 | End: 2018-06-14

## 2018-06-14 RX ORDER — IBUPROFEN 600 MG/1
600 TABLET ORAL EVERY 6 HOURS PRN
Status: DISCONTINUED | OUTPATIENT
Start: 2018-06-14 | End: 2018-06-16 | Stop reason: HOSPADM

## 2018-06-14 RX ORDER — VITAMIN A ACETATE, BETA CAROTENE, ASCORBIC ACID, CHOLECALCIFEROL, .ALPHA.-TOCOPHEROL ACETATE, DL-, THIAMINE MONONITRATE, RIBOFLAVIN, NIACINAMIDE, PYRIDOXINE HYDROCHLORIDE, FOLIC ACID, CYANOCOBALAMIN, CALCIUM CARBONATE, FERROUS FUMARATE, ZINC OXIDE, CUPRIC OXIDE 3080; 12; 120; 400; 1; 1.84; 3; 20; 22; 920; 25; 200; 27; 10; 2 [IU]/1; UG/1; MG/1; [IU]/1; MG/1; MG/1; MG/1; MG/1; MG/1; [IU]/1; MG/1; MG/1; MG/1; MG/1; MG/1
1 TABLET, FILM COATED ORAL EVERY MORNING
Status: DISCONTINUED | OUTPATIENT
Start: 2018-06-15 | End: 2018-06-16 | Stop reason: HOSPADM

## 2018-06-14 RX ORDER — ONDANSETRON 2 MG/ML
4 INJECTION INTRAMUSCULAR; INTRAVENOUS EVERY 4 HOURS PRN
Status: DISCONTINUED | OUTPATIENT
Start: 2018-06-14 | End: 2018-06-16 | Stop reason: HOSPADM

## 2018-06-14 RX ORDER — AMPICILLIN 1 G/1
1 INJECTION, POWDER, FOR SOLUTION INTRAMUSCULAR; INTRAVENOUS EVERY 4 HOURS
Status: DISCONTINUED | OUTPATIENT
Start: 2018-06-14 | End: 2018-06-14 | Stop reason: HOSPADM

## 2018-06-14 RX ORDER — MISOPROSTOL 200 UG/1
800 TABLET ORAL
Status: DISCONTINUED | OUTPATIENT
Start: 2018-06-14 | End: 2018-06-14 | Stop reason: HOSPADM

## 2018-06-14 RX ORDER — ONDANSETRON 4 MG/1
4 TABLET, ORALLY DISINTEGRATING ORAL EVERY 6 HOURS PRN
Status: DISCONTINUED | OUTPATIENT
Start: 2018-06-14 | End: 2018-06-16 | Stop reason: HOSPADM

## 2018-06-14 RX ORDER — BUPIVACAINE HYDROCHLORIDE 2.5 MG/ML
INJECTION, SOLUTION EPIDURAL; INFILTRATION; INTRACAUDAL
Status: ACTIVE
Start: 2018-06-14 | End: 2018-06-15

## 2018-06-14 RX ORDER — SODIUM CHLORIDE, SODIUM LACTATE, POTASSIUM CHLORIDE, CALCIUM CHLORIDE 600; 310; 30; 20 MG/100ML; MG/100ML; MG/100ML; MG/100ML
INJECTION, SOLUTION INTRAVENOUS PRN
Status: DISCONTINUED | OUTPATIENT
Start: 2018-06-14 | End: 2018-06-16 | Stop reason: HOSPADM

## 2018-06-14 RX ORDER — SODIUM CHLORIDE, SODIUM LACTATE, POTASSIUM CHLORIDE, CALCIUM CHLORIDE 600; 310; 30; 20 MG/100ML; MG/100ML; MG/100ML; MG/100ML
INJECTION, SOLUTION INTRAVENOUS CONTINUOUS
Status: DISPENSED | OUTPATIENT
Start: 2018-06-14 | End: 2018-06-14

## 2018-06-14 RX ORDER — ROPIVACAINE HYDROCHLORIDE 2 MG/ML
INJECTION, SOLUTION EPIDURAL; INFILTRATION; PERINEURAL
Status: COMPLETED
Start: 2018-06-14 | End: 2018-06-14

## 2018-06-14 RX ORDER — DOCUSATE SODIUM 100 MG/1
100 CAPSULE, LIQUID FILLED ORAL 2 TIMES DAILY PRN
Status: DISCONTINUED | OUTPATIENT
Start: 2018-06-14 | End: 2018-06-16 | Stop reason: HOSPADM

## 2018-06-14 RX ORDER — OXYCODONE HYDROCHLORIDE AND ACETAMINOPHEN 5; 325 MG/1; MG/1
2 TABLET ORAL EVERY 4 HOURS PRN
Status: DISCONTINUED | OUTPATIENT
Start: 2018-06-14 | End: 2018-06-16 | Stop reason: HOSPADM

## 2018-06-14 RX ORDER — OXYCODONE HYDROCHLORIDE AND ACETAMINOPHEN 5; 325 MG/1; MG/1
1 TABLET ORAL EVERY 4 HOURS PRN
Status: DISCONTINUED | OUTPATIENT
Start: 2018-06-14 | End: 2018-06-16 | Stop reason: HOSPADM

## 2018-06-14 RX ORDER — LIDOCAINE HYDROCHLORIDE 10 MG/ML
INJECTION, SOLUTION EPIDURAL; INFILTRATION; INTRACAUDAL; PERINEURAL
Status: ACTIVE
Start: 2018-06-14 | End: 2018-06-15

## 2018-06-14 RX ADMIN — IBUPROFEN 600 MG: 600 TABLET, FILM COATED ORAL at 19:00

## 2018-06-14 RX ADMIN — AMPICILLIN SODIUM 2 G: 2 INJECTION, POWDER, FOR SOLUTION INTRAMUSCULAR; INTRAVENOUS at 13:42

## 2018-06-14 RX ADMIN — ONDANSETRON 4 MG: 2 INJECTION INTRAMUSCULAR; INTRAVENOUS at 16:05

## 2018-06-14 RX ADMIN — SODIUM CHLORIDE, POTASSIUM CHLORIDE, SODIUM LACTATE AND CALCIUM CHLORIDE: 600; 310; 30; 20 INJECTION, SOLUTION INTRAVENOUS at 14:07

## 2018-06-14 RX ADMIN — OXYCODONE HYDROCHLORIDE AND ACETAMINOPHEN 1 TABLET: 5; 325 TABLET ORAL at 22:40

## 2018-06-14 RX ADMIN — SODIUM CHLORIDE, POTASSIUM CHLORIDE, SODIUM LACTATE AND CALCIUM CHLORIDE: 600; 310; 30; 20 INJECTION, SOLUTION INTRAVENOUS at 13:41

## 2018-06-14 RX ADMIN — Medication 125 ML/HR: at 21:13

## 2018-06-14 RX ADMIN — ROPIVACAINE HYDROCHLORIDE 100 ML: 2 INJECTION, SOLUTION EPIDURAL; INFILTRATION at 14:19

## 2018-06-14 RX ADMIN — Medication 2000 ML/HR: at 17:25

## 2018-06-14 ASSESSMENT — PATIENT HEALTH QUESTIONNAIRE - PHQ9
1. LITTLE INTEREST OR PLEASURE IN DOING THINGS: NOT AT ALL
SUM OF ALL RESPONSES TO PHQ9 QUESTIONS 1 AND 2: 0
2. FEELING DOWN, DEPRESSED, IRRITABLE, OR HOPELESS: NOT AT ALL
SUM OF ALL RESPONSES TO PHQ9 QUESTIONS 1 AND 2: 0
2. FEELING DOWN, DEPRESSED, IRRITABLE, OR HOPELESS: NOT AT ALL
1. LITTLE INTEREST OR PLEASURE IN DOING THINGS: NOT AT ALL

## 2018-06-14 ASSESSMENT — PAIN SCALES - GENERAL
PAINLEVEL_OUTOF10: 5
PAINLEVEL_OUTOF10: 3

## 2018-06-14 ASSESSMENT — COPD QUESTIONNAIRES
IN THE PAST 12 MONTHS DO YOU DO LESS THAN YOU USED TO BECAUSE OF YOUR BREATHING PROBLEMS: DISAGREE/UNSURE
COPD SCREENING SCORE: 0
DO YOU EVER COUGH UP ANY MUCUS OR PHLEGM?: NO/ONLY WITH OCCASIONAL COLDS OR INFECTIONS
HAVE YOU SMOKED AT LEAST 100 CIGARETTES IN YOUR ENTIRE LIFE: NO/DON'T KNOW
DURING THE PAST 4 WEEKS HOW MUCH DID YOU FEEL SHORT OF BREATH: NONE/LITTLE OF THE TIME

## 2018-06-14 ASSESSMENT — LIFESTYLE VARIABLES
EVER_SMOKED: NEVER
ALCOHOL_USE: NO

## 2018-06-14 NOTE — PROGRESS NOTES
Patient comes in with complaints of contractions.  She was 3 cm yesterday.  SVE is 5/80.  Dr Richardson called, patient to be admitted.  Report given to Aspen GUERRERO.

## 2018-06-14 NOTE — CARE PLAN
Problem: Infection  Goal: Will remain free from infection    Intervention: Assess signs and symptoms of infection  Pt is free from infection  Intervention: Implement standard precautions and perform hand washing before and after patient contact  Hand hygiene performed before and after pt contact      Problem: Pain  Goal: Alleviation of Pain or a reduction in pain to the patient's comfort goal  Outcome: PROGRESSING AS EXPECTED  Pt is free from pain with an epidural in place  Intervention: Pain Management - Epidural/Spinal  Pt has an epidural in place  Intervention: Pain Management--Medications  Pt educated on medications available for pain

## 2018-06-14 NOTE — PROGRESS NOTES
[facility] LABOR AND DELIVERY PROGRESS NOTE    PATIENT ID:  NAME:  Marlys Jane  MRN:               [id number]  YOB: 1995     22 y.o. female  at 40w5d.    Subjective: received epidural and is having excellent pain relief - good cervical progress after epidural and still having regular cxn's    positive  For CTXS.   negative Feels pain   negative for LOF  negative for vaginal bleeding.   positive for fetal movement    Objective:    Vitals:    18 1427 18 1431 18 1436 18 1441   BP: 129/64 127/58 118/58 131/61   Pulse: 94 86 86 97   Temp:       TempSrc:       SpO2:       Weight:       Height:           Cervix:  9cm/100%/-1 - bulging bag  Lawn: Uterine Contractions Q3-5 minutes.   FHRM: Baseline 130-140, Accels to 155, moderate variability  Pitocin: none  Pain control: epidural    Assessment: 22 y.o. female  at 40w5d    Plan:   1. Will wait to ROM until second dose abx  2. Continue current management - anticipate

## 2018-06-14 NOTE — PROGRESS NOTES
1220: Assumed care of pt. AAO, pt ambulated to S215, POC discussed, pt verbalized understanding. All needs met at this time.

## 2018-06-14 NOTE — H&P
History and Physical    Marlys Jane is a 22 y.o. female  -Para:     Gestational Age:  40w5d  Admitted for:   Active Labor  Admitted to  [facility].  [facility]    HPI: Patient is admitted with the above mentioned Chief Complaint and States   Loss of fluid:   negative  Abdominal Pain:  positive  Uterine Contractions:  positive  Vaginal Bleeding:  negative  Fetal Movement:  normal  Patient denies fever, chills, nausea, vomiting , headache, visual disturbance, or dysuria  Patient's last menstrual period was 2017.  Estimated Date of Delivery: 18  Final SALVATORE: 2018, by Last Menstrual Period    Patient Active Problem List    Diagnosis Date Noted   • Encounter for supervision of other normal pregnancy, third trimester 2018   • GBS bacteriuria 2018   • History of  delivery 2018       Admitting DX: Pregnancy  Labor and delivery indication for care or intervention  Pregnancy Complications:  GBS bacteruria  OB Risk Factors:   none  Labor State:    Active phase labor.    History:   has a past medical history of Anxiety (2017); Gall stones; and Kidney disease. She also has no past medical history of CAD (coronary artery disease); Liver disease; or Seizure disorder (HCC).     has a past surgical history that includes cystoscopy stent placement (N/A, 2016); ureteroscopy (Right, 2016); and lasertripsy (Right, 2016).    OB History    Para Term  AB Living   6 3 1 2 2 3   SAB TAB Ectopic Molar Multiple Live Births   2         3      # Outcome Date GA Lbr Jeff/2nd Weight Sex Delivery Anes PTL Lv   6 Current            5 Term 12 39w0d  3.175 kg (7 lb) M Vag-Spont EPI N       Birth Comments: Pt states had [place] injection.    4 SAB  5w0d             Birth Comments: Pt states no D/C needed.    3  12/23/10 31w0d  2.268 kg (5 lb) M Vag-Spont None N YADY      Birth Comments: Pt states went  into labor.    2 SAB  5w0d             Birth Comments: Pt states no D/C needed.    1  09 32w0d  2.268 kg (5 lb) F Vag-Spont None Y YADY      Birth Comments: Pt states went into labor.          Medications:  No current facility-administered medications on file prior to encounter.      Current Outpatient Prescriptions on File Prior to Encounter   Medication Sig Dispense Refill   • Prenatal Vit-Fe Fumarate-FA (PRENATAL PO) Take 2 Tabs by mouth every day.         Allergies:  Patient has no known allergies.    ROS:   Neuro: negative for HA/vision changes    Cardiovascular: negative for chest pain/palpitations/dyspnea  Gastro intestinal: negative for N/V  Genitourinary: negative for dysuria/burning/itching            Physical Exam:  /72   Pulse 91   Temp 36.3 °C (97.4 °F) (Temporal)   Ht 1.524 m (5')   Wt 84.8 kg (187 lb)   LMP 2017   BMI 36.52 kg/m²   Constitutional: gravid adult female  No JVD: negative @ 30 degrees  HEENT: NCAT, neck supple  Breast Exam: declined  Cardio: RRR, S1/2 appreciated w/ grade 1 systolic murmur  Lung: CTA bilat w/o wheezes/crackles  Abdomen: Gravid abdomen , BS+, nontender, leopolds cephalic  Extremity: warm, +1 edema bilat, pulses symmetric    Cervical Exam: 80%  Cervix Dilatation: 5  Station: negative 1 - cephalic presentation  Pelvis: Adequate  Fetal Assessment: baseline 140, accels to 160, moderate variability  Estimated Fetal Weight: 3200-3500g      Labs:      Prenatal Results     1st Trimester     Test Value Date Time    ABO A  18 1036    RH POS  18 1036    Antibody NEG  18 1036    CBC/PLT/DIFF       HGB 12.6 g/dL 18 1657    Platelets 247 K/uL 18 1657    HGB A1C        1 Hr  mg/dL (H) 18 1041    3 Hr GTT 81 mg/dL 18 0926    Rubella 35.70 IU/mL 18 1036    RPR       Urine Culture Mixed skin aron ,000 cfu/mL  (A) 18 1036      Streptococcus agalactiae (Group B)  <10,000 cfu/mL    (A)  18 1036    24 Urine Protein        24 Urine Creatinine        HBsAg Negative  18 1036    Hep CAB        HIV       Gonorrhea       Chlamydia       TSH        Free T4         TB 0mm  17 1421    Pap       SYPHILUS TREP QUAL [id number] [[id number]][             2nd Trimester     Test Value Date Time    HCT 39.2 % 18 1657    HGB 12.6 g/dL 18 1657    1 Hr  mg/dL (H) 18 1041    3 Hr GTT 81 mg/dL 18 0926          24-28 Weeks     Test Value Date Time    1 Hr GCT  143 mg/dL (H) 18 1041    TSH        Free T4        24 Urine Protein       24 Urine Creatinine       BUN 7 mg/dL (L) 18 1657    Creatinine 0.61 mg/dL 18 1657    GFR >60 mL/min/1.73 m 2 18 1657    AST 14 U/L 18 1657    ALT 10 U/L 18 1657    Uric Acid       LDH             3rd Trimester     Test Value Date Time    HCT 39.2 % 18 1657    HGB 12.6 g/dL 18 1657    TSH       Free T4       24 Hr Urine Protein       24 Hr Urine Creatinine       SYPHILUS TREP QUAL Non Reactive  18 1041          35-37 Weeks     Test Value Date Time    GBS PCR LB       GBS PCR             Genetic Screening     Test Value Date Time    Cystic Fibrosis       AFP Quad Normal  18 1036    Sickle Cell                     Assessment:  Gestational Age:  40w5d  Labor State:   Active labor  Risk Factors:   group B strep colonizer  Pregnancy Complications: none    Patient Active Problem List    Diagnosis Date Noted   • Encounter for supervision of other normal pregnancy, third trimester 2018   • GBS bacteriuria 2018   • History of  delivery 2018       Plan:   Admitted for: active labor  Desiring epidural pain managemement  GBS bacteruria - no allergies - ampicillin for ppx  Anticipate   Will augment w/ pitocin as needed    Gorge Richardson D.O.      OB MD Addendum:  In to meet patient, comfortable with epidural.  Ampicillin for GBS  Reassuring FHT  Expect

## 2018-06-15 LAB
ERYTHROCYTE [DISTWIDTH] IN BLOOD BY AUTOMATED COUNT: 46.6 FL (ref 35.9–50)
HCT VFR BLD AUTO: 33.3 % (ref 37–47)
HGB BLD-MCNC: 11.2 G/DL (ref 12–16)
MCH RBC QN AUTO: 32.3 PG (ref 27–33)
MCHC RBC AUTO-ENTMCNC: 33.6 G/DL (ref 33.6–35)
MCV RBC AUTO: 96 FL (ref 81.4–97.8)
PLATELET # BLD AUTO: 189 K/UL (ref 164–446)
PMV BLD AUTO: 10.1 FL (ref 9–12.9)
RBC # BLD AUTO: 3.47 M/UL (ref 4.2–5.4)
WBC # BLD AUTO: 14.7 K/UL (ref 4.8–10.8)

## 2018-06-15 PROCEDURE — A9270 NON-COVERED ITEM OR SERVICE: HCPCS | Performed by: OBSTETRICS & GYNECOLOGY

## 2018-06-15 PROCEDURE — 85027 COMPLETE CBC AUTOMATED: CPT

## 2018-06-15 PROCEDURE — 700102 HCHG RX REV CODE 250 W/ 637 OVERRIDE(OP): Performed by: OBSTETRICS & GYNECOLOGY

## 2018-06-15 PROCEDURE — 770002 HCHG ROOM/CARE - OB PRIVATE (112)

## 2018-06-15 PROCEDURE — 700112 HCHG RX REV CODE 229: Performed by: OBSTETRICS & GYNECOLOGY

## 2018-06-15 PROCEDURE — 36415 COLL VENOUS BLD VENIPUNCTURE: CPT

## 2018-06-15 RX ADMIN — OXYCODONE AND ACETAMINOPHEN 2 TABLET: 5; 325 TABLET ORAL at 14:48

## 2018-06-15 RX ADMIN — IBUPROFEN 600 MG: 600 TABLET, FILM COATED ORAL at 19:19

## 2018-06-15 RX ADMIN — OXYCODONE AND ACETAMINOPHEN 2 TABLET: 5; 325 TABLET ORAL at 08:08

## 2018-06-15 RX ADMIN — OXYCODONE AND ACETAMINOPHEN 2 TABLET: 5; 325 TABLET ORAL at 19:19

## 2018-06-15 RX ADMIN — Medication 1 TABLET: at 09:00

## 2018-06-15 RX ADMIN — DOCUSATE SODIUM 100 MG: 100 CAPSULE ORAL at 19:19

## 2018-06-15 ASSESSMENT — PAIN SCALES - GENERAL
PAINLEVEL_OUTOF10: 3
PAINLEVEL_OUTOF10: 7
PAINLEVEL_OUTOF10: 7
PAINLEVEL_OUTOF10: 1
PAINLEVEL_OUTOF10: 7
PAINLEVEL_OUTOF10: 0

## 2018-06-15 ASSESSMENT — PATIENT HEALTH QUESTIONNAIRE - PHQ9
1. LITTLE INTEREST OR PLEASURE IN DOING THINGS: NOT AT ALL
2. FEELING DOWN, DEPRESSED, IRRITABLE, OR HOPELESS: NOT AT ALL
1. LITTLE INTEREST OR PLEASURE IN DOING THINGS: NOT AT ALL
SUM OF ALL RESPONSES TO PHQ9 QUESTIONS 1 AND 2: 0
SUM OF ALL RESPONSES TO PHQ9 QUESTIONS 1 AND 2: 0
2. FEELING DOWN, DEPRESSED, IRRITABLE, OR HOPELESS: NOT AT ALL

## 2018-06-15 NOTE — PROGRESS NOTES
0700 - Bedside report received from YOLANDA Barrera. Patient care assumed. Chart and orders reviewed  0808 - Pt assessment complete, wnl. Fundus firm with minimal discharge. Pt ambulating to bathroom and voiding without difficulty. Patient denies any dizziness or lightheadedness at this time. Patient denies any calf pain or tenderness at this time. Reviewed use of emergency light. Plan of care discussed with patient for the day. Pain medication plan discussed with patient; patient states she will call if PRN pain medication is wanted. All questions/concerns addressed at this time. Encouraged to call with needs, will monitor

## 2018-06-15 NOTE — PROGRESS NOTES
1915- Assumed patient care, patient received dinner. Tolerating food and fluids.  - transition assessment  - Patient up to restroom with dustin care and rinse off in shower. Fundus firm. Lochia light. Family visiting.   - patient up to restroom, fundus firm. Lochia light with some gushes after standing. Able to void. Declines need for pain intervention. Second bag of Pitocin infusion started at 125 ml/hr. Awaiting clean room assignment.  - Fundal check by YOLANDA Be. patient wheeled up to post partum room 331 with  in arms. Cuddles and bands verified with YOLANDA Hammer. Bedside report given.

## 2018-06-15 NOTE — PROGRESS NOTES
Post Partum Progress Note    Name:   Marlys Jane   Date/Time:  6/15/2018 - 6:48 AM  Chief Admitting Dx:  Pregnancy  Labor and delivery indication for care or intervention  Delivery Type:  vaginal, spontaneous  Post-Op/Post Partum Days #:  1    Subjective:  Abdominal pain: yes  Ambulating:   yes  Tolerating liquids:  yes  Tolerating food:  yes common adult  Flatus:   yes  BM:    no  Bleeding:   with a small amount of bleeding  Voiding:   yes  Dizziness:   no  Feeding:   breast    Vitals:    06/14/18 2100 06/14/18 2210 06/15/18 0000 06/15/18 0400   BP: 123/66 110/62 111/61 108/68   Pulse: 77 (!) 101 60 71   Resp: 16 18 17 18   Temp: 36.4 °C (97.6 °F) 36.5 °C (97.7 °F) 36.6 °C (97.9 °F)    TempSrc: Temporal      SpO2:  91% 97% 96%   Weight:       Height:           Exam:  Breast: Lactating yes  Abdomen: Abdomen soft, non-tender. BS normal. No masses,  No organomegaly  Fundal Tenderness:  no  Fundus Firm: yes  Incision: none  Below umbilicus: yes  Perineum: perineum intact  Lochia: mild  Extremities: Normal extremities, peripheral pulses and reflexes normal, no edema, redness or tenderness in the calves or thighs    Meds:  Current Facility-Administered Medications   Medication Dose   • ondansetron (ZOFRAN ODT) dispertab 4 mg  4 mg    Or   • ondansetron (ZOFRAN) syringe/vial injection 4 mg  4 mg   • oxytocin (PITOCIN) infusion (for postpartum)   mL/hr   • ibuprofen (MOTRIN) tablet 600 mg  600 mg   • oxyCODONE-acetaminophen (PERCOCET) 5-325 MG per tablet 1 Tab  1 Tab   • oxyCODONE-acetaminophen (PERCOCET) 5-325 MG per tablet 2 Tab  2 Tab   • ondansetron (ZOFRAN) syringe/vial injection 4 mg  4 mg   • LR infusion     • docusate sodium (COLACE) capsule 100 mg  100 mg   • prenatal plus vitamin (STUARTNATAL 1+1) 27-1 MG tablet 1 Tab  1 Tab       Labs:   Recent Labs      06/14/18   1257  06/15/18   0218   WBC  10.6  14.7*   RBC  4.06*  3.47*   HEMOGLOBIN  13.2  11.2*   HEMATOCRIT  38.9  33.3*   MCV  95.8  96.0   MCH   32.5  32.3   MCHC  33.9  33.6   RDW  46.5  46.6   PLATELETCT  216  189   MPV  10.6  10.1       Assessment:  Chief Admitting Dx:  Pregnancy  Labor and delivery indication for care or intervention  Delivery Type:  vaginal, spontaneous  Tubal Ligation:  no    Plan:  Continue routine post partum care.  Encouraged ambulation and breastfeeding  Anticipate DC home on PPD#2 secondary to GBS+    ASHWINI HornNKHARI.

## 2018-06-15 NOTE — DELIVERY NOTE
VAGINAL DELIVERY PRODEDURE NOTE    PATIENT ID:  NAME:  Marlys Jane  MRN:               [id number]  YOB: 1995    On 2018 at 1718, this 22 y.o., now 40w5d , GBS positive (1 dose amp) female delivered via  under epidural anesthesia a viable male infant weighing 7 lbs and 10 oz (3470g) with APGAR scores of 8 and 9 at one and five minutes w/ terminal meconium. The infant was delivered through a single tight nuchal cord.  Infant was bulb suctioned at delivery. Cord was doubly clamped, cut by FOB and infant handed to RN in attendance. An intact placenta was delivered spontaneously at 1727 with 3 vessel cord. Upon vaginal exam, there was a right labial superficial 1st degree laceration which was repaired using 4.0 vicryl in the usual sterile fashion. Superficial left labial and posterior perineal abrasions were hemostatic and required no repair. Pitocin was started after delivery of placenta - fundus firm on bimanual massage. Estimated blood loss was 150cc. Patient will be transferred to postpartum in stable condition and infant to  nursery.    Gorge Richardson D.O.    Delivery attended by Dr. Tellez who was present for the entire delivery.

## 2018-06-15 NOTE — PROGRESS NOTES
Patient arrived to the floor in wheelchair at 2150 holding infant. Infant placed in open crib. Pt assisted to the bath room. Patient provided with spray, tucks, and ice. Bands verified. Pt and FOb oriented to the room. Call light with in reach. All needs met at this time.

## 2018-06-16 VITALS
WEIGHT: 187 LBS | DIASTOLIC BLOOD PRESSURE: 62 MMHG | TEMPERATURE: 98 F | HEART RATE: 85 BPM | BODY MASS INDEX: 36.71 KG/M2 | OXYGEN SATURATION: 95 % | SYSTOLIC BLOOD PRESSURE: 110 MMHG | HEIGHT: 60 IN | RESPIRATION RATE: 18 BRPM

## 2018-06-16 PROCEDURE — A9270 NON-COVERED ITEM OR SERVICE: HCPCS | Performed by: OBSTETRICS & GYNECOLOGY

## 2018-06-16 PROCEDURE — 700112 HCHG RX REV CODE 229: Performed by: OBSTETRICS & GYNECOLOGY

## 2018-06-16 PROCEDURE — 700102 HCHG RX REV CODE 250 W/ 637 OVERRIDE(OP): Performed by: OBSTETRICS & GYNECOLOGY

## 2018-06-16 RX ORDER — PSEUDOEPHEDRINE HCL 30 MG
100 TABLET ORAL 2 TIMES DAILY PRN
Qty: 60 CAP | Refills: 5 | Status: SHIPPED | OUTPATIENT
Start: 2018-06-16 | End: 2018-10-08

## 2018-06-16 RX ORDER — IBUPROFEN 800 MG/1
800 TABLET ORAL EVERY 6 HOURS PRN
Qty: 30 TAB | Refills: 5 | Status: SHIPPED | OUTPATIENT
Start: 2018-06-16 | End: 2018-10-08

## 2018-06-16 RX ADMIN — IBUPROFEN 600 MG: 600 TABLET, FILM COATED ORAL at 10:01

## 2018-06-16 RX ADMIN — Medication 1 TABLET: at 10:01

## 2018-06-16 RX ADMIN — DOCUSATE SODIUM 100 MG: 100 CAPSULE ORAL at 10:01

## 2018-06-16 RX ADMIN — IBUPROFEN 600 MG: 600 TABLET, FILM COATED ORAL at 04:33

## 2018-06-16 RX ADMIN — OXYCODONE HYDROCHLORIDE AND ACETAMINOPHEN 1 TABLET: 5; 325 TABLET ORAL at 04:33

## 2018-06-16 RX ADMIN — OXYCODONE HYDROCHLORIDE AND ACETAMINOPHEN 1 TABLET: 5; 325 TABLET ORAL at 10:01

## 2018-06-16 ASSESSMENT — PAIN SCALES - GENERAL
PAINLEVEL_OUTOF10: 5
PAINLEVEL_OUTOF10: 3
PAINLEVEL_OUTOF10: 2
PAINLEVEL_OUTOF10: 6
PAINLEVEL_OUTOF10: 0

## 2018-06-16 NOTE — DISCHARGE SUMMARY
Discharge Summary:      Marlys Jane      Admit Date:   2018  Discharge Date:  2018     Admitting diagnosis:  Pregnancy  Labor and delivery indication for care or intervention  Discharge Diagnosis: Status post vaginal, spontaneous.  Pregnancy Complications: group B strep (treated)  Tubal Ligation:  no        History:  Past Medical History:   Diagnosis Date   • Anxiety    • Gall stones    • Kidney disease     kidney stones     OB History    Para Term  AB Living   6 3 1 2 2 3   SAB TAB Ectopic Molar Multiple Live Births   2         3      # Outcome Date GA Lbr Jeff/2nd Weight Sex Delivery Anes PTL Lv   6 Current            5 Term 12 39w0d  3.175 kg (7 lb) M Vag-Spont EPI N       Birth Comments: Pt states had [place] injection.    4 SAB  5w0d             Birth Comments: Pt states no D/C needed.    3  12/23/10 31w0d  2.268 kg (5 lb) M Vag-Spont None N YADY      Birth Comments: Pt states went into labor.    2 SAB  5w0d             Birth Comments: Pt states no D/C needed.    1  09 32w0d  2.268 kg (5 lb) F Vag-Spont None Y YADY      Birth Comments: Pt states went into labor.           Patient has no known allergies.  Patient Active Problem List    Diagnosis Date Noted   • Encounter for supervision of other normal pregnancy, third trimester 2018   • GBS bacteriuria 2018   • History of  delivery 2018        Hospital Course:   22 y.o. , now para 4, was admitted with the above mentioned diagnosis, underwent Active Labor, vaginal, spontaneous. Patient postpartum course was unremarkable, with progressive advancement in diet , ambulation and toleration of oral analgesia. Patient without complaints today and desires discharge.      Vitals:    06/15/18 0000 06/15/18 0400 06/15/18 1000 06/15/18 2000   BP: 111/61 108/68 107/66 101/64   Pulse: 60 71 86 86   Resp:    Temp: 36.6 °C (97.9 °F)  37.1 °C (98.8 °F) 36.3 °C (97.4 °F)    TempSrc:       SpO2: 97% 96% 95% 97%   Weight:       Height:           Current Facility-Administered Medications   Medication Dose   • ondansetron (ZOFRAN ODT) dispertab 4 mg  4 mg    Or   • ondansetron (ZOFRAN) syringe/vial injection 4 mg  4 mg   • oxytocin (PITOCIN) infusion (for postpartum)   mL/hr   • ibuprofen (MOTRIN) tablet 600 mg  600 mg   • oxyCODONE-acetaminophen (PERCOCET) 5-325 MG per tablet 1 Tab  1 Tab   • oxyCODONE-acetaminophen (PERCOCET) 5-325 MG per tablet 2 Tab  2 Tab   • ondansetron (ZOFRAN) syringe/vial injection 4 mg  4 mg   • LR infusion     • docusate sodium (COLACE) capsule 100 mg  100 mg   • prenatal plus vitamin (STUARTNATAL 1+1) 27-1 MG tablet 1 Tab  1 Tab       Exam:  Breast Exam: negative  Abdomen: Abdomen soft, non-tender. BS normal. No masses,  No organomegaly  Fundus Non Tender: yes  Incision: none  Perineum: perineum intact; right labial first degree  Extremity: extremities, peripheral pulses and reflexes normal, no edema, redness or tenderness in the calves or thighs     Labs:  Recent Labs      06/14/18   1257  06/15/18   0218   WBC  10.6  14.7*   RBC  4.06*  3.47*   HEMOGLOBIN  13.2  11.2*   HEMATOCRIT  38.9  33.3*   MCV  95.8  96.0   MCH  32.5  32.3   MCHC  33.9  33.6   RDW  46.5  46.6   PLATELETCT  216  189   MPV  10.6  10.1        Activity:   Discharge to home  Pelvic Rest x 6 weeks    Assessment:  normal postpartum course  Discharge Assessment: No areas of skin breakdown/redness; surgical incision intact/healing     [facility]: [facility] or [facility] in 5 weeks for vaginal ; 1 week for incision check.      Discharge Meds:   Current Outpatient Prescriptions   Medication Sig Dispense Refill   • ibuprofen (MOTRIN) 800 MG Tab Take 1 Tab by mouth every 6 hours as needed (For cramping after delivery; do not give if patient is receiving ketorolac (Toradol)). 30 Tab 5   • docusate sodium 100 MG Cap Take 100 mg by mouth 2 times a day as needed for Constipation. 60 Cap 5        ALPESH Sommer.

## 2018-06-16 NOTE — DISCHARGE INSTRUCTIONS
POSTPARTUM DISCHARGE INSTRUCTIONS FOR MOM    YOB: 1995   Age: 22 y.o.               Admit Date: 6/14/2018     Discharge Date: 6/16/2018  Attending Doctor:  Javi Tellez M.D.                  Allergies:  Patient has no known allergies.    Discharged to home by car. Discharged via wheelchair, hospital escort: Yes.  Special equipment needed: Not Applicable  Belongings with: Personal  Be sure to schedule a follow-up appointment with your primary care doctor or any specialists as instructed.     Discharge Plan:   Diet Plan: Discussed  Activity Level: Discussed  Confirmed Follow up Appointment: Appointment Scheduled  Confirmed Symptoms Management: Discussed  Medication Reconciliation Updated: Yes  Influenza Vaccine Indication: Not indicated: Previously immunized this influenza season and > 8 years of age    REASONS TO CALL YOUR OBSTETRICIAN:  1.   Persistent fever or shaking chills (Temperature higher than 100.4)  2.   Heavy bleeding (soaking more than 1 pad per hour); Passing clots  3.   Foul odor from vagina  4.   Mastitis (Breast infection; breast pain, chills, fever, redness)  5.   Urinary pain, burning or frequency  6.   Episiotomy infection  7.   Abdominal incision infection  8.   Severe depression longer than 24 hours    HAND WASHING  · Prior to handling the baby.  · Before breastfeeding or bottle feeding baby.  · After using the bathroom or changing the baby's diaper.    VAGINAL CARE  · Nothing inside vagina for 6 weeks: no sexual intercourse, tampons or douching.  · Bleeding may continue for 2-4 weeks.  Amount may vary.    · Call your physician for heavy bleeding which means soaking more than 1 pad per hour    BIRTH CONTROL  · It is possible to become pregnant at any time after delivery and while breastfeeding.  · Plan to discuss a method of birth control with your physician at your follow up visit. visit.    DIET AND ELIMINATION  · Eating more fiber (bran cereal, fruits, and vegetables) and  "drinking plenty of fluids will help to avoid constipation.  · Urinary frequency after childbirth is normal.    POSTPARTUM BLUES  During the first few days after birth, you may experience a sense of the \"blues\" which may include impatience, irritability or even crying.  These feeling come and go quickly.  However, as many as 1 in 10 women experience emotional symptoms known as postpartum depression.    Postpartum depression:  May start as early as the second or third day after delivery or take several weeks or months to develop.  Symptoms of \"blues\" are present, but are more intense:  Crying spells; loss of appetite; feelings of hopelessness or loss of control; fear of touching the baby; over concern or no concern at all about the baby; little or no concern about your own appearance/caring for yourself; and/or inability to sleep or excessive sleeping.  Contact your physician if you are experiencing any of these symptoms.    Crisis Hotline:  · [place] Crisis Hotline:  2-624-WUGBOXK  Or 1-370.987.1253  · [place] Crisis Hotline:  1-581.479.6279  Or 703-583-3867    PREVENTING SHAKEN BABY:  If you are angry or stressed, PUT THE BABY IN THE CRIB, step away, take some deep breaths, and wait until you are calm to care for the baby.  DO NOT SHAKE THE BABY.  You are not alone, call a supporter for help.    · Crisis Call Center 24/7 crisis line 684-100-3269 or 1-591.396.5674  · You can also text them, text \"ANSWER\" to 823345    QUIT SMOKING/TOBACCO USE:  I understand the use of any tobacco products increases my chance of suffering from future heart disease and could cause other illnesses which may shorten my life. Quitting the use of tobacco products is the single most important thing I can do to improve my health. For further information on smoking / tobacco cessation call a Toll Free Quit Line at 1-344.168.8321 (*[employer] [place]) or 1-922.115.8421 (American Lung Association) or you can access the web based program " at www.lungusa.org.    · [place] Tobacco Users Help Line:  (523) 312-8473       Toll Free: 1-485.215.9683  · Quit Tobacco Program [facility] (799)660-6130    DEPRESSION / SUICIDE RISK:  As you are discharged from this [facility], it is important to learn how to keep safe from harming yourself.    Recognize the warning signs:  · Abrupt changes in personality, positive or negative- including increase in energy   · Giving away possessions  · Change in eating patterns- significant weight changes-  positive or negative  · Change in sleeping patterns- unable to sleep or sleeping all the time   · Unwillingness or inability to communicate  · Depression  · Unusual sadness, discouragement and loneliness  · Talk of wanting to die  · Neglect of personal appearance   · Rebelliousness- reckless behavior  · Withdrawal from people/activities they love  · Confusion- inability to concentrate     If you or a loved one observes any of these behaviors or has concerns about self-harm, here's what you can do:  · Talk about it- your feelings and reasons for harming yourself  · Remove any means that you might use to hurt yourself (examples: pills, rope, extension cords, firearm)  · Get professional help from the community (Mental Health, Substance Abuse, psychological counseling)  · Do not be alone:Call your Safe Contact- someone whom you trust who will be there for you.  · Call your local CRISIS HOTLINE 664-6494 or 835-325-4213  · Call your local Children's Mobile Crisis Response Team [place] (939) 796-5928 or www.[employer]  · Call the toll free National Suicide Prevention Hotlines   · National Suicide Prevention Lifeline 144-048-JLMO (3733)  · National Hope Line Network 800-SUICIDE (441-3830)    DISCHARGE SURVEY:  Thank you for choosing [facility].  We hope we provided you with very good care.  You may be receiving a survey in the mail.  Please fill it out.  Your opinion is valuable to  us.    ADDITIONAL EDUCATIONAL MATERIALS GIVEN TO PATIENT:        My signature on this form indicates that:  1.  I have reviewed and understand the above information  2.  My questions regarding this information have been answered to my satisfaction.  3.  I have formulated a plan with my discharge nurse to obtain my prescribed medication for home.

## 2018-06-16 NOTE — CARE PLAN
Problem: Altered physiologic condition related to immediate post-delivery state and potential for bleeding/hemorrhage  Goal: Patient physiologically stable as evidenced by normal lochia, palpable uterine involution and vital signs within normal limits  Outcome: PROGRESSING AS EXPECTED  Fundus firm,lochia light.    Problem: Alteration in comfort related to episiotomy, vaginal repair and/or after birth pains  Goal: Patient verbalizes acceptable pain level  Outcome: PROGRESSING AS EXPECTED  Patient verbalized that pain is well-controlled with pain meds.Will offer pain med.[id number].Ambulating well without difficulty.Using tucks and spray for perineal discomfort with good effects.    Problem: Potential knowledge deficit related to lack of understanding of self and  care  Goal: Patient will verbalize understanding of self and infant care  Outcome: PROGRESSING AS EXPECTED  Discharge teachings given and verbalized understanding,questions answered.Have car seat for infant.

## 2018-06-16 NOTE — PROGRESS NOTES
0700-Report received at bedside from Lorin GUERRERO, assumed care of patient.  Encouraged to call with needs, denies at this time.   0840-Assessment completed, fundus is firm, lochia light and vital signs within defined parameters. Patient is ambulating and voiding without difficulty. Bonding well with infant, bottle feeding independently.  Discussed with patient pain medication plan, patient requesting to be medicated PRN, will call for medication. Plan of care discussed, patient verbalized understanding. Hourly rounding implemented, call light within reach.

## 2018-06-29 ENCOUNTER — TELEPHONE (OUTPATIENT)
Dept: OBGYN | Facility: CLINIC | Age: 23
End: 2018-06-29

## 2018-06-29 NOTE — TELEPHONE ENCOUNTER
Pt calls with Post-Partum questions about stitches from  2 weeks ago.Pt states the area is healing well but has noted these stitches have not dissolved.Pt denies pain or abnormal d/c from this area.Pt understands this can take even a few more weeks and care of area is discussed and understood.tmm

## 2018-07-25 ENCOUNTER — POST PARTUM (OUTPATIENT)
Dept: OBGYN | Facility: CLINIC | Age: 23
End: 2018-07-25
Payer: MEDICAID

## 2018-07-25 VITALS — SYSTOLIC BLOOD PRESSURE: 102 MMHG | WEIGHT: 177 LBS | DIASTOLIC BLOOD PRESSURE: 62 MMHG | BODY MASS INDEX: 34.57 KG/M2

## 2018-07-25 DIAGNOSIS — Z34.83 ENCOUNTER FOR SUPERVISION OF OTHER NORMAL PREGNANCY, THIRD TRIMESTER: ICD-10-CM

## 2018-07-25 DIAGNOSIS — Z30.014 ENCOUNTER FOR INITIAL PRESCRIPTION OF INTRAUTERINE CONTRACEPTIVE DEVICE (IUD): ICD-10-CM

## 2018-07-25 DIAGNOSIS — R82.71 GBS BACTERIURIA: ICD-10-CM

## 2018-07-25 DIAGNOSIS — Z87.51 HISTORY OF PRETERM DELIVERY: ICD-10-CM

## 2018-07-25 PROCEDURE — 90050 PR POSTPARTUM VISIT: CPT | Performed by: OBSTETRICS & GYNECOLOGY

## 2018-07-25 RX ORDER — MEDROXYPROGESTERONE ACETATE 150 MG/ML
150 INJECTION, SUSPENSION INTRAMUSCULAR ONCE
Qty: 1 VIAL | Refills: 0 | Status: SHIPPED | OUTPATIENT
Start: 2018-07-25 | End: 2018-07-25

## 2018-07-25 NOTE — PROGRESS NOTES
Marlys Jane is a 23 y.o. female who presents for her Postpartum Exam      HPI Comments: Pt presents for postpartum exam s/p vaginal, spontaneous on 6/14/18. Patient is without complaints.  Baby doing well. bottle feeding.  No depression/anxiety.  Not sexually active.  Lochia resolved.  Desires IUD for contraception.    Review of Systems:   Pertinent positives documented in HPI and all other systems reviewed & are negative    Last pap: 02/08/18    Physical exam:   Vital measurements:  Blood pressure 102/62, weight 80.3 kg (177 lb), last menstrual period 09/02/2017, unknown if currently breastfeeding.  Body mass index is 34.57 kg/m². (Goal BMI>18 <25)  Nursing note and vitals reviewed.  Gen: She is oriented to person, place, and time. She appears well-developed and well-nourished. No distress.   Heart: Heart regular rate and rhythm  Lungs: clear to auscultation bilaterally  Breasts: nontender, not engorged, no dominant masses, nipples intact  Abdomen: soft, nontender, nondistended, no rebound/guarding  Extremities: nontender, no clubbing, cyanosis or edema  Pelvic: Normal external female genitalia, well-healed perineum from delivery, cervix is normal, uterus approximately 8-10 weeks in size non tender, adnexa bilaterally normal with no dominant masses    A/P: Postpartum status post vaginal, spontaneous  Doing well without complaints  Continue prenatal vitamins if breast feeding  May resume normal activities including exercise, tampons, and intercourse  Contraception IUD, but desires depo-provera in meantime  Follow up in February 2019 for annual exam or sooner as needed

## 2018-07-25 NOTE — PROGRESS NOTES
Pt here today for postpartum exam, pt delivered on 6/14/18  Currently bottle   BCM: Pt would like an IUD.   Good ph:[phone]  Pt states no complaints.

## 2018-07-26 ENCOUNTER — TELEPHONE (OUTPATIENT)
Dept: OBGYN | Facility: MEDICAL CENTER | Age: 23
End: 2018-07-26

## 2018-07-26 NOTE — TELEPHONE ENCOUNTER
RX called into pharmacy with Dr Sotelo's NPI. Per pharmacy Dr. Almeida not contracted with medicaid yet. Pt notified to  either tomorrow or next week. Pt verbalized understanding.

## 2018-08-02 DIAGNOSIS — R30.9 PAINFUL URINATION: ICD-10-CM

## 2018-08-02 NOTE — TELEPHONE ENCOUNTER
Call from pt states she has a UTI and is requesting antibiotics, her S/S are frequent urination, pain,buring and blood in urine. States that it started about a wks ago. Pt denies any fever or chills. Consulted with Ila Norris CNM. She advised that pt need to have urine culture done first before any any antibiotics can be given.  She advised that once culture comes back and if positive will call out rx at that time. Order placed for urine culture and pt instucted to be seen at any [facility].  Pt states that she verbally understands. And will await call with results.

## 2018-08-29 ENCOUNTER — GYNECOLOGY VISIT (OUTPATIENT)
Dept: OBGYN | Facility: CLINIC | Age: 23
End: 2018-08-29
Payer: MEDICAID

## 2018-08-29 VITALS — BODY MASS INDEX: 33.79 KG/M2 | DIASTOLIC BLOOD PRESSURE: 74 MMHG | WEIGHT: 173 LBS | SYSTOLIC BLOOD PRESSURE: 118 MMHG

## 2018-08-29 DIAGNOSIS — Z30.430 ENCOUNTER FOR INSERTION OF PARAGARD IUD: Primary | ICD-10-CM

## 2018-08-29 PROBLEM — Z34.83 ENCOUNTER FOR SUPERVISION OF OTHER NORMAL PREGNANCY, THIRD TRIMESTER: Status: RESOLVED | Noted: 2018-02-05 | Resolved: 2018-08-29

## 2018-08-29 PROBLEM — Z87.51 HISTORY OF PRETERM DELIVERY: Status: RESOLVED | Noted: 2018-02-05 | Resolved: 2018-08-29

## 2018-08-29 PROBLEM — R82.71 GBS BACTERIURIA: Status: RESOLVED | Noted: 2018-02-05 | Resolved: 2018-08-29

## 2018-08-29 LAB
INT CON NEG: NEGATIVE
INT CON POS: POSITIVE
POC URINE PREGNANCY TEST: NEGATIVE

## 2018-08-29 PROCEDURE — 58300 INSERT INTRAUTERINE DEVICE: CPT | Performed by: NURSE PRACTITIONER

## 2018-08-29 PROCEDURE — 81025 URINE PREGNANCY TEST: CPT | Performed by: NURSE PRACTITIONER

## 2018-08-29 ASSESSMENT — ENCOUNTER SYMPTOMS
CARDIOVASCULAR NEGATIVE: 1
CONSTITUTIONAL NEGATIVE: 1
RESPIRATORY NEGATIVE: 1
NEUROLOGICAL NEGATIVE: 1
GASTROINTESTINAL NEGATIVE: 1
PSYCHIATRIC NEGATIVE: 1
EYES NEGATIVE: 1
MUSCULOSKELETAL NEGATIVE: 1

## 2018-08-29 NOTE — PROGRESS NOTES
Subjective:      Marlys Jane is a 23 y.o. female who presents for contraceptive management/ IUD placement. She had a  on 18, has not had a period until today. Patient is currently bleeding.    IUD: Paragard is choice:    Today the patient is counseled on the risks of IUD insertion. Specifically discussed were alternative forms of birth control. I also discussed with the patient the risk of infection on insertion, and had asked the patient to remain on pelvic rest for one week following the insertion. We also discussed the risk of IUD expulsion, the risk of uterine perforation and IUD migration. If the IUD does migrate the patient may require a separate procedure such as a laparoscopy to retrieve the migrated IUD. I also discussed the 1% risk of pregnancy with IUD use. I also discussed the side effects of Paragard which can be amenorrhea or dysfunctional uterine bleeding or spotting.  Patient had the opportunity to ask questions regarding insertion, risks and benefits, all questions are answered in their entirety.  Informed consent is signed    Procedure note    Urine pregnancy test is negative, informed consent was previously signed  The bimanual exam is performed the uterus is noted to be 8 cm in size and is retroflexed. A speculum was inserted into the vagina, the cervix was cleansed with Betadine swabs x3  Tenaculum was placed on the anterior lip of the cervix  The uterus was sounded to 8 centimeters  The IUD is placed under sterile conditions, expulsed upon removal of guide, replaced with new IUD, no complications  The strings trimmed to approximately 3 cm  Tenaculum was removed from the cervix and hemostasis was achieved with silver nitrate  The patient tolerated the procedure well                 Lot:[id number]  Exp: 10/2024  Patient is asked to followup in 4-6 weeks for IUD check. The patient is asked to remain on pelvic rest for 2-3 days.  Use a backup method for 7 days, condoms. She is asked to  return sooner than 4-6 weeks for heavy vaginal bleeding uncontrolled pain or fever                        Risks: Irregular bleeding, pain during and after insertion, migration of device, expulsion of device, pregnancy (ectopic is more common in women with IUD's).  Benefits: decreased bleeding and cramping, 99.2% effective contraception, good for 5 years, concealed method, well tolerated by most recipients.   Insertion risks are: infection and perforation of the uterus, rare but can happen. Watch for fever, foul smelling discharge, heavy bleeding and abd pain not controlled with Ibuprofen.                HPI    Review of Systems   Constitutional: Negative.    HENT: Negative.    Eyes: Negative.    Respiratory: Negative.    Cardiovascular: Negative.    Gastrointestinal: Negative.    Genitourinary: Negative.    Musculoskeletal: Negative.    Skin: Negative.    Neurological: Negative.    Endo/Heme/Allergies: Negative.    Psychiatric/Behavioral: Negative.    All other systems reviewed and are negative.         Objective:     /74   Wt 78.5 kg (173 lb)   BMI 33.79 kg/m²      Physical Exam   Constitutional: She is oriented to person, place, and time. She appears well-developed and well-nourished.   Abdominal: Soft.   Genitourinary: Vagina normal and uterus normal.   Neurological: She is alert and oriented to person, place, and time.   Skin: Skin is warm and dry.   Psychiatric: She has a normal mood and affect. Her behavior is normal. Judgment and thought content normal.   Nursing note reviewed.              Assessment/Plan:     1. Encounter for insertion of ParaGard IUD    - Consent for Surgery / Procedure  - POCT Pregnancy

## 2018-10-08 ENCOUNTER — APPOINTMENT (OUTPATIENT)
Dept: RADIOLOGY | Facility: MEDICAL CENTER | Age: 23
End: 2018-10-08
Attending: EMERGENCY MEDICINE
Payer: MEDICAID

## 2018-10-08 ENCOUNTER — HOSPITAL ENCOUNTER (EMERGENCY)
Facility: MEDICAL CENTER | Age: 23
End: 2018-10-08
Attending: EMERGENCY MEDICINE
Payer: MEDICAID

## 2018-10-08 VITALS
HEIGHT: 60 IN | SYSTOLIC BLOOD PRESSURE: 112 MMHG | RESPIRATION RATE: 16 BRPM | OXYGEN SATURATION: 98 % | WEIGHT: 174.6 LBS | HEART RATE: 81 BPM | BODY MASS INDEX: 34.28 KG/M2 | DIASTOLIC BLOOD PRESSURE: 59 MMHG | TEMPERATURE: 97.9 F

## 2018-10-08 DIAGNOSIS — J01.90 ACUTE SINUSITIS, RECURRENCE NOT SPECIFIED, UNSPECIFIED LOCATION: ICD-10-CM

## 2018-10-08 DIAGNOSIS — R05.9 COUGH: ICD-10-CM

## 2018-10-08 PROCEDURE — 99284 EMERGENCY DEPT VISIT MOD MDM: CPT

## 2018-10-08 PROCEDURE — 71046 X-RAY EXAM CHEST 2 VIEWS: CPT

## 2018-10-08 RX ORDER — BENZONATATE 100 MG/1
100 CAPSULE ORAL 3 TIMES DAILY PRN
Qty: 15 CAP | Refills: 0 | Status: ON HOLD | OUTPATIENT
Start: 2018-10-08 | End: 2018-12-09

## 2018-10-08 RX ORDER — AMOXICILLIN 500 MG/1
500 TABLET, FILM COATED ORAL 3 TIMES DAILY
Qty: 30 TAB | Refills: 0 | Status: SHIPPED | OUTPATIENT
Start: 2018-10-08 | End: 2018-10-18

## 2018-10-08 NOTE — ED NOTES
Med rec updated and complete  Allergies reviewed  Pt reports no prescription medications, vitamins, or OTC's.  Pt reports no antibiotics in the last 30 days.

## 2018-10-08 NOTE — ED PROVIDER NOTES
"ED Provider Note    CHIEF COMPLAINT  Chief Complaint   Patient presents with   • Cough     Pt c/o cough x 3 weeks with intermittent nasal congestion.        HPI  Marlys Jane is a 23 y.o. female who presents with cough, onset 3 weeks ago.  Her son has similar complaint.  She started with \"head congestion, now it is in my chest\".  She describes the cough is mostly nonproductive.  No shortness of breath.  No neck stiffness.  Patient does experience some facial pain.  She states headache posterior is intermittent, currently resolved.  No rash.  She does not smoke, denies history of asthma.  No abdominal pain.  No vomiting or diarrhea.    REVIEW OF SYSTEMS  Constitutional: Denies fever  Respiratory: Cough  ENT facial pain, congestion  Neurologic: Headache intermittent.  No numbness, no weakness  Gastrointestinal: No abdominal pain  Musculoskeletal: No back pain    PAST MEDICAL HISTORY  Past Medical History:   Diagnosis Date   • Anxiety 2017   • Gall stones    • Kidney disease     kidney stones       FAMILY HISTORY  Family History   Problem Relation Age of Onset   • No Known Problems Maternal Grandmother    • No Known Problems Maternal Grandfather    • No Known Problems Paternal Grandmother    • No Known Problems Paternal Grandfather        SOCIAL HISTORY  Social History     Social History   • Marital status: Single     Spouse name: N/A   • Number of children: N/A   • Years of education: N/A     Social History Main Topics   • Smoking status: Never Smoker   • Smokeless tobacco: Never Used   • Alcohol use No   • Drug use: No   • Sexual activity: Yes     Partners: Male     Birth control/ protection: Surgical, IUD      Comment: Paragard on 8/29/2019     Other Topics Concern   • Not on file     Social History Narrative   • No narrative on file       SURGICAL HISTORY  Past Surgical History:   Procedure Laterality Date   • CYSTOSCOPY STENT PLACEMENT N/A 5/17/2016    Procedure: CYSTOSCOPY STENT PLACEMENT;  Surgeon: Sherif VALDEZ" MAYA Lees;  Location: SURGERY [facility];  Service:    • URETEROSCOPY Right 5/17/2016    Procedure: URETEROSCOPY;  Surgeon: Sherif Lees M.D.;  Location: SURGERY [facility];  Service:    • LASERTRIPSY Right 5/17/2016    Procedure: LASERTRIPSY LITHO;  Surgeon: Sherif Lees M.D.;  Location: SURGERY [facility];  Service:        CURRENT MEDICATIONS  No current facility-administered medications on file prior to encounter.      Current Outpatient Prescriptions on File Prior to Encounter   Medication Sig Dispense Refill   • ibuprofen (MOTRIN) 800 MG Tab Take 1 Tab by mouth every 6 hours as needed (For cramping after delivery; do not give if patient is receiving ketorolac (Toradol)). 30 Tab 5   • docusate sodium 100 MG Cap Take 100 mg by mouth 2 times a day as needed for Constipation. 60 Cap 5   • Prenatal Vit-Fe Fumarate-FA (PRENATAL PO) Take 2 Tabs by mouth every day.         ALLERGIES  No Known Allergies    PHYSICAL EXAM  VITAL SIGNS: /63   Pulse 92   Temp 36.6 °C (97.9 °F)   Resp 16   Ht 1.524 m (5')   Wt 79.2 kg (174 lb 9.7 oz)   LMP 10/06/2018   SpO2 96%   BMI 34.10 kg/m²   Constitutional:  Well nourished, No acute distress.   HENT: Tenderness left paranasal region without underlying swelling or crepitance.  Posterior pharynx normal  Lymphatics: No adenopathy  Eyes:  Conjunctiva normal, No discharge.    Cardiovascular: The heart is regular rhythm, normal rate  Pulmonary: Slight expiratory wheeze after coughing.  Breath sounds are clear at rest, no crackles  Skin: No cyanosis.   Musculoskeletal: Neck nontender   Neurologic: speech is clear, no ataxia   Psychiatric:  Mood normal.  Cooperative      COURSE & MEDICAL DECISION MAKING  Pertinent Labs & Imaging studies reviewed. (See chart for details)  Patient had negative chest x-ray, no evidence of pneumonia.  She does however have persistent facial congestion and now pain near the junction of the maxillary and ethmoid sinus region.   She is placed on 10-day course of amoxicillin for coverage of sinusitis, likely secondary complication of persistent viral upper respiratory infection.  I recommended she see a doctor for recheck in 1 week if no improvement.  She was discharged in good condition    FINAL IMPRESSION     1. Acute sinusitis, recurrence not specified, unspecified location    2. Cough                 Electronically signed by: Zeke Bustos, 10/8/2018 8:43 AM

## 2018-10-08 NOTE — ED TRIAGE NOTES
Chief Complaint   Patient presents with   • Cough     Pt c/o cough x 3 weeks with intermittent nasal congestion.      /63   Pulse 92   Temp 36.6 °C (97.9 °F)   Resp 16   Ht 1.524 m (5')   Wt 79.2 kg (174 lb 9.7 oz)   LMP 10/06/2018   SpO2 96%   BMI 34.10 kg/m²

## 2018-12-08 ENCOUNTER — HOSPITAL ENCOUNTER (OUTPATIENT)
Facility: MEDICAL CENTER | Age: 23
End: 2018-12-10
Attending: EMERGENCY MEDICINE | Admitting: FAMILY MEDICINE
Payer: MEDICAID

## 2018-12-08 DIAGNOSIS — N20.0 NEPHROLITHIASIS: ICD-10-CM

## 2018-12-08 DIAGNOSIS — N13.2 HYDRONEPHROSIS WITH URINARY OBSTRUCTION DUE TO URETERAL CALCULUS: ICD-10-CM

## 2018-12-08 PROCEDURE — 87077 CULTURE AEROBIC IDENTIFY: CPT

## 2018-12-08 PROCEDURE — 99285 EMERGENCY DEPT VISIT HI MDM: CPT

## 2018-12-08 PROCEDURE — 87086 URINE CULTURE/COLONY COUNT: CPT

## 2018-12-08 RX ORDER — COPPER 313.4 MG/1
INTRAUTERINE DEVICE INTRAUTERINE CONTINUOUS
COMMUNITY
End: 2020-07-25

## 2018-12-08 ASSESSMENT — PAIN DESCRIPTION - DESCRIPTORS: DESCRIPTORS: GNAWING;TENDER

## 2018-12-08 ASSESSMENT — PAIN SCALES - GENERAL: PAINLEVEL_OUTOF10: 10

## 2018-12-09 ENCOUNTER — APPOINTMENT (OUTPATIENT)
Dept: RADIOLOGY | Facility: MEDICAL CENTER | Age: 23
End: 2018-12-09
Attending: EMERGENCY MEDICINE
Payer: MEDICAID

## 2018-12-09 ENCOUNTER — APPOINTMENT (OUTPATIENT)
Dept: RADIOLOGY | Facility: MEDICAL CENTER | Age: 23
End: 2018-12-09
Attending: UROLOGY
Payer: MEDICAID

## 2018-12-09 LAB
ALBUMIN SERPL BCP-MCNC: 4.6 G/DL (ref 3.2–4.9)
ALBUMIN/GLOB SERPL: 1.2 G/DL
ALP SERPL-CCNC: 71 U/L (ref 30–99)
ALT SERPL-CCNC: 24 U/L (ref 2–50)
ANION GAP SERPL CALC-SCNC: 9 MMOL/L (ref 0–11.9)
APPEARANCE UR: ABNORMAL
AST SERPL-CCNC: 23 U/L (ref 12–45)
BACTERIA #/AREA URNS HPF: ABNORMAL /HPF
BASOPHILS # BLD AUTO: 0.3 % (ref 0–1.8)
BASOPHILS # BLD: 0.03 K/UL (ref 0–0.12)
BILIRUB SERPL-MCNC: 0.3 MG/DL (ref 0.1–1.5)
BILIRUB UR QL STRIP.AUTO: NEGATIVE
BUN SERPL-MCNC: 13 MG/DL (ref 8–22)
CALCIUM SERPL-MCNC: 10.3 MG/DL (ref 8.5–10.5)
CAOX CRY #/AREA URNS HPF: ABNORMAL /HPF
CHLORIDE SERPL-SCNC: 105 MMOL/L (ref 96–112)
CO2 SERPL-SCNC: 23 MMOL/L (ref 20–33)
COLOR UR: YELLOW
CREAT SERPL-MCNC: 0.83 MG/DL (ref 0.5–1.4)
EOSINOPHIL # BLD AUTO: 0.15 K/UL (ref 0–0.51)
EOSINOPHIL NFR BLD: 1.4 % (ref 0–6.9)
EPI CELLS #/AREA URNS HPF: ABNORMAL /HPF
ERYTHROCYTE [DISTWIDTH] IN BLOOD BY AUTOMATED COUNT: 41.7 FL (ref 35.9–50)
GLOBULIN SER CALC-MCNC: 3.7 G/DL (ref 1.9–3.5)
GLUCOSE SERPL-MCNC: 89 MG/DL (ref 65–99)
GLUCOSE UR STRIP.AUTO-MCNC: NEGATIVE MG/DL
HCG SERPL QL: NEGATIVE
HCT VFR BLD AUTO: 42.3 % (ref 37–47)
HGB BLD-MCNC: 13.9 G/DL (ref 12–16)
HYALINE CASTS #/AREA URNS LPF: ABNORMAL /LPF
IMM GRANULOCYTES # BLD AUTO: 0.03 K/UL (ref 0–0.11)
IMM GRANULOCYTES NFR BLD AUTO: 0.3 % (ref 0–0.9)
KETONES UR STRIP.AUTO-MCNC: ABNORMAL MG/DL
LEUKOCYTE ESTERASE UR QL STRIP.AUTO: ABNORMAL
LIPASE SERPL-CCNC: 62 U/L (ref 11–82)
LYMPHOCYTES # BLD AUTO: 4.15 K/UL (ref 1–4.8)
LYMPHOCYTES NFR BLD: 37.4 % (ref 22–41)
MCH RBC QN AUTO: 29.7 PG (ref 27–33)
MCHC RBC AUTO-ENTMCNC: 32.9 G/DL (ref 33.6–35)
MCV RBC AUTO: 90.4 FL (ref 81.4–97.8)
MICRO URNS: ABNORMAL
MONOCYTES # BLD AUTO: 0.88 K/UL (ref 0–0.85)
MONOCYTES NFR BLD AUTO: 7.9 % (ref 0–13.4)
MUCOUS THREADS #/AREA URNS HPF: ABNORMAL /HPF
NEUTROPHILS # BLD AUTO: 5.85 K/UL (ref 2–7.15)
NEUTROPHILS NFR BLD: 52.7 % (ref 44–72)
NITRITE UR QL STRIP.AUTO: NEGATIVE
NRBC # BLD AUTO: 0 K/UL
NRBC BLD-RTO: 0 /100 WBC
PH UR STRIP.AUTO: 5 [PH]
PLATELET # BLD AUTO: 256 K/UL (ref 164–446)
PMV BLD AUTO: 9.3 FL (ref 9–12.9)
POTASSIUM SERPL-SCNC: 3.6 MMOL/L (ref 3.6–5.5)
PROT SERPL-MCNC: 8.3 G/DL (ref 6–8.2)
PROT UR QL STRIP: 100 MG/DL
RBC # BLD AUTO: 4.68 M/UL (ref 4.2–5.4)
RBC # URNS HPF: ABNORMAL /HPF
RBC UR QL AUTO: ABNORMAL
SODIUM SERPL-SCNC: 137 MMOL/L (ref 135–145)
SP GR UR STRIP.AUTO: 1.03
UROBILINOGEN UR STRIP.AUTO-MCNC: 1 MG/DL
WBC # BLD AUTO: 11.1 K/UL (ref 4.8–10.8)
WBC #/AREA URNS HPF: ABNORMAL /HPF

## 2018-12-09 PROCEDURE — 83690 ASSAY OF LIPASE: CPT

## 2018-12-09 PROCEDURE — 700101 HCHG RX REV CODE 250: Performed by: FAMILY MEDICINE

## 2018-12-09 PROCEDURE — 160028 HCHG SURGERY MINUTES - 1ST 30 MINS LEVEL 3: Performed by: UROLOGY

## 2018-12-09 PROCEDURE — A9270 NON-COVERED ITEM OR SERVICE: HCPCS | Performed by: STUDENT IN AN ORGANIZED HEALTH CARE EDUCATION/TRAINING PROGRAM

## 2018-12-09 PROCEDURE — 700111 HCHG RX REV CODE 636 W/ 250 OVERRIDE (IP)

## 2018-12-09 PROCEDURE — A9270 NON-COVERED ITEM OR SERVICE: HCPCS | Performed by: FAMILY MEDICINE

## 2018-12-09 PROCEDURE — G0378 HOSPITAL OBSERVATION PER HR: HCPCS

## 2018-12-09 PROCEDURE — C1758 CATHETER, URETERAL: HCPCS | Performed by: UROLOGY

## 2018-12-09 PROCEDURE — 81001 URINALYSIS AUTO W/SCOPE: CPT

## 2018-12-09 PROCEDURE — 501329 HCHG SET, CYSTO IRRIG Y TUR: Performed by: UROLOGY

## 2018-12-09 PROCEDURE — 700101 HCHG RX REV CODE 250

## 2018-12-09 PROCEDURE — 700111 HCHG RX REV CODE 636 W/ 250 OVERRIDE (IP): Performed by: ANESTHESIOLOGY

## 2018-12-09 PROCEDURE — 700111 HCHG RX REV CODE 636 W/ 250 OVERRIDE (IP): Performed by: STUDENT IN AN ORGANIZED HEALTH CARE EDUCATION/TRAINING PROGRAM

## 2018-12-09 PROCEDURE — 96375 TX/PRO/DX INJ NEW DRUG ADDON: CPT

## 2018-12-09 PROCEDURE — 160002 HCHG RECOVERY MINUTES (STAT): Performed by: UROLOGY

## 2018-12-09 PROCEDURE — C1769 GUIDE WIRE: HCPCS | Performed by: UROLOGY

## 2018-12-09 PROCEDURE — 160039 HCHG SURGERY MINUTES - EA ADDL 1 MIN LEVEL 3: Performed by: UROLOGY

## 2018-12-09 PROCEDURE — 160009 HCHG ANES TIME/MIN: Performed by: UROLOGY

## 2018-12-09 PROCEDURE — 700111 HCHG RX REV CODE 636 W/ 250 OVERRIDE (IP): Performed by: FAMILY MEDICINE

## 2018-12-09 PROCEDURE — 700111 HCHG RX REV CODE 636 W/ 250 OVERRIDE (IP): Performed by: EMERGENCY MEDICINE

## 2018-12-09 PROCEDURE — 160035 HCHG PACU - 1ST 60 MINS PHASE I: Performed by: UROLOGY

## 2018-12-09 PROCEDURE — 700105 HCHG RX REV CODE 258: Performed by: FAMILY MEDICINE

## 2018-12-09 PROCEDURE — 85025 COMPLETE CBC W/AUTO DIFF WBC: CPT

## 2018-12-09 PROCEDURE — 700102 HCHG RX REV CODE 250 W/ 637 OVERRIDE(OP): Performed by: STUDENT IN AN ORGANIZED HEALTH CARE EDUCATION/TRAINING PROGRAM

## 2018-12-09 PROCEDURE — 99285 EMERGENCY DEPT VISIT HI MDM: CPT

## 2018-12-09 PROCEDURE — 700117 HCHG RX CONTRAST REV CODE 255: Performed by: EMERGENCY MEDICINE

## 2018-12-09 PROCEDURE — 96374 THER/PROPH/DIAG INJ IV PUSH: CPT

## 2018-12-09 PROCEDURE — 80053 COMPREHEN METABOLIC PANEL: CPT

## 2018-12-09 PROCEDURE — 160048 HCHG OR STATISTICAL LEVEL 1-5: Performed by: UROLOGY

## 2018-12-09 PROCEDURE — 84703 CHORIONIC GONADOTROPIN ASSAY: CPT

## 2018-12-09 PROCEDURE — 500879 HCHG PACK, CYSTO: Performed by: UROLOGY

## 2018-12-09 PROCEDURE — 700102 HCHG RX REV CODE 250 W/ 637 OVERRIDE(OP): Performed by: FAMILY MEDICINE

## 2018-12-09 PROCEDURE — 74177 CT ABD & PELVIS W/CONTRAST: CPT

## 2018-12-09 RX ORDER — HALOPERIDOL 5 MG/ML
1 INJECTION INTRAMUSCULAR
Status: DISCONTINUED | OUTPATIENT
Start: 2018-12-09 | End: 2018-12-09 | Stop reason: HOSPADM

## 2018-12-09 RX ORDER — ONDANSETRON 2 MG/ML
4 INJECTION INTRAMUSCULAR; INTRAVENOUS
Status: DISCONTINUED | OUTPATIENT
Start: 2018-12-09 | End: 2018-12-09 | Stop reason: HOSPADM

## 2018-12-09 RX ORDER — HYDROMORPHONE HYDROCHLORIDE 1 MG/ML
0.4 INJECTION, SOLUTION INTRAMUSCULAR; INTRAVENOUS; SUBCUTANEOUS
Status: DISCONTINUED | OUTPATIENT
Start: 2018-12-09 | End: 2018-12-09 | Stop reason: HOSPADM

## 2018-12-09 RX ORDER — MORPHINE SULFATE 10 MG/ML
3 INJECTION, SOLUTION INTRAMUSCULAR; INTRAVENOUS
Status: DISCONTINUED | OUTPATIENT
Start: 2018-12-09 | End: 2018-12-10 | Stop reason: HOSPADM

## 2018-12-09 RX ORDER — POLYETHYLENE GLYCOL 3350 17 G/17G
1 POWDER, FOR SOLUTION ORAL
Status: DISCONTINUED | OUTPATIENT
Start: 2018-12-09 | End: 2018-12-10 | Stop reason: HOSPADM

## 2018-12-09 RX ORDER — SODIUM CHLORIDE AND POTASSIUM CHLORIDE 150; 900 MG/100ML; MG/100ML
INJECTION, SOLUTION INTRAVENOUS CONTINUOUS
Status: DISCONTINUED | OUTPATIENT
Start: 2018-12-09 | End: 2018-12-10 | Stop reason: HOSPADM

## 2018-12-09 RX ORDER — SODIUM CHLORIDE, SODIUM LACTATE, POTASSIUM CHLORIDE, CALCIUM CHLORIDE 600; 310; 30; 20 MG/100ML; MG/100ML; MG/100ML; MG/100ML
INJECTION, SOLUTION INTRAVENOUS CONTINUOUS
Status: DISCONTINUED | OUTPATIENT
Start: 2018-12-09 | End: 2018-12-09 | Stop reason: HOSPADM

## 2018-12-09 RX ORDER — BISACODYL 10 MG
10 SUPPOSITORY, RECTAL RECTAL
Status: DISCONTINUED | OUTPATIENT
Start: 2018-12-09 | End: 2018-12-10 | Stop reason: HOSPADM

## 2018-12-09 RX ORDER — DIPHENHYDRAMINE HYDROCHLORIDE 50 MG/ML
12.5 INJECTION INTRAMUSCULAR; INTRAVENOUS
Status: DISCONTINUED | OUTPATIENT
Start: 2018-12-09 | End: 2018-12-09 | Stop reason: HOSPADM

## 2018-12-09 RX ORDER — KETOROLAC TROMETHAMINE 30 MG/ML
30 INJECTION, SOLUTION INTRAMUSCULAR; INTRAVENOUS EVERY 6 HOURS PRN
Status: DISCONTINUED | OUTPATIENT
Start: 2018-12-09 | End: 2018-12-10 | Stop reason: HOSPADM

## 2018-12-09 RX ORDER — OXYCODONE HYDROCHLORIDE 5 MG/1
10 TABLET ORAL
Status: DISCONTINUED | OUTPATIENT
Start: 2018-12-09 | End: 2018-12-09 | Stop reason: HOSPADM

## 2018-12-09 RX ORDER — ACETAMINOPHEN 325 MG/1
650 TABLET ORAL EVERY 6 HOURS PRN
Status: DISCONTINUED | OUTPATIENT
Start: 2018-12-09 | End: 2018-12-10 | Stop reason: HOSPADM

## 2018-12-09 RX ORDER — MEPERIDINE HYDROCHLORIDE 25 MG/ML
12.5 INJECTION INTRAMUSCULAR; INTRAVENOUS; SUBCUTANEOUS
Status: DISCONTINUED | OUTPATIENT
Start: 2018-12-09 | End: 2018-12-09 | Stop reason: HOSPADM

## 2018-12-09 RX ORDER — OXYCODONE HYDROCHLORIDE AND ACETAMINOPHEN 5; 325 MG/1; MG/1
1 TABLET ORAL EVERY 4 HOURS PRN
Status: DISCONTINUED | OUTPATIENT
Start: 2018-12-09 | End: 2018-12-10 | Stop reason: HOSPADM

## 2018-12-09 RX ORDER — HYDROMORPHONE HYDROCHLORIDE 1 MG/ML
0.2 INJECTION, SOLUTION INTRAMUSCULAR; INTRAVENOUS; SUBCUTANEOUS
Status: DISCONTINUED | OUTPATIENT
Start: 2018-12-09 | End: 2018-12-09 | Stop reason: HOSPADM

## 2018-12-09 RX ORDER — AMOXICILLIN 250 MG
2 CAPSULE ORAL 2 TIMES DAILY
Status: DISCONTINUED | OUTPATIENT
Start: 2018-12-09 | End: 2018-12-10 | Stop reason: HOSPADM

## 2018-12-09 RX ORDER — OXYCODONE HYDROCHLORIDE 5 MG/1
5 TABLET ORAL
Status: DISCONTINUED | OUTPATIENT
Start: 2018-12-09 | End: 2018-12-09 | Stop reason: HOSPADM

## 2018-12-09 RX ORDER — MORPHINE SULFATE 10 MG/ML
6 INJECTION, SOLUTION INTRAMUSCULAR; INTRAVENOUS ONCE
Status: COMPLETED | OUTPATIENT
Start: 2018-12-09 | End: 2018-12-09

## 2018-12-09 RX ORDER — HYDRALAZINE HYDROCHLORIDE 20 MG/ML
5 INJECTION INTRAMUSCULAR; INTRAVENOUS
Status: DISCONTINUED | OUTPATIENT
Start: 2018-12-09 | End: 2018-12-09 | Stop reason: HOSPADM

## 2018-12-09 RX ORDER — ONDANSETRON 2 MG/ML
4 INJECTION INTRAMUSCULAR; INTRAVENOUS EVERY 6 HOURS PRN
Status: DISCONTINUED | OUTPATIENT
Start: 2018-12-09 | End: 2018-12-10 | Stop reason: HOSPADM

## 2018-12-09 RX ORDER — HYDROMORPHONE HYDROCHLORIDE 1 MG/ML
0.1 INJECTION, SOLUTION INTRAMUSCULAR; INTRAVENOUS; SUBCUTANEOUS
Status: DISCONTINUED | OUTPATIENT
Start: 2018-12-09 | End: 2018-12-09 | Stop reason: HOSPADM

## 2018-12-09 RX ORDER — ONDANSETRON 2 MG/ML
4 INJECTION INTRAMUSCULAR; INTRAVENOUS ONCE
Status: COMPLETED | OUTPATIENT
Start: 2018-12-09 | End: 2018-12-09

## 2018-12-09 RX ORDER — KETOROLAC TROMETHAMINE 30 MG/ML
30 INJECTION, SOLUTION INTRAMUSCULAR; INTRAVENOUS ONCE
Status: COMPLETED | OUTPATIENT
Start: 2018-12-09 | End: 2018-12-09

## 2018-12-09 RX ADMIN — STANDARDIZED SENNA CONCENTRATE AND DOCUSATE SODIUM 2 TABLET: 8.6; 5 TABLET, FILM COATED ORAL at 16:55

## 2018-12-09 RX ADMIN — STANDARDIZED SENNA CONCENTRATE AND DOCUSATE SODIUM 2 TABLET: 8.6; 5 TABLET, FILM COATED ORAL at 05:01

## 2018-12-09 RX ADMIN — SODIUM CHLORIDE, SODIUM LACTATE, POTASSIUM CHLORIDE, CALCIUM CHLORIDE: 600; 310; 30; 20 INJECTION, SOLUTION INTRAVENOUS at 14:41

## 2018-12-09 RX ADMIN — CEFTRIAXONE SODIUM 1 G: 1 INJECTION, POWDER, FOR SOLUTION INTRAMUSCULAR; INTRAVENOUS at 04:41

## 2018-12-09 RX ADMIN — POTASSIUM CHLORIDE AND SODIUM CHLORIDE: 900; 150 INJECTION, SOLUTION INTRAVENOUS at 04:40

## 2018-12-09 RX ADMIN — POTASSIUM CHLORIDE AND SODIUM CHLORIDE: 900; 150 INJECTION, SOLUTION INTRAVENOUS at 16:55

## 2018-12-09 RX ADMIN — ACETAMINOPHEN 650 MG: 325 TABLET, FILM COATED ORAL at 05:01

## 2018-12-09 RX ADMIN — ONDANSETRON HYDROCHLORIDE 4 MG: 2 INJECTION, SOLUTION INTRAMUSCULAR; INTRAVENOUS at 17:31

## 2018-12-09 RX ADMIN — ONDANSETRON HYDROCHLORIDE 4 MG: 2 INJECTION, SOLUTION INTRAMUSCULAR; INTRAVENOUS at 00:30

## 2018-12-09 RX ADMIN — IOHEXOL 100 ML: 350 INJECTION, SOLUTION INTRAVENOUS at 00:58

## 2018-12-09 RX ADMIN — MORPHINE SULFATE 6 MG: 10 INJECTION INTRAVENOUS at 00:22

## 2018-12-09 RX ADMIN — MORPHINE SULFATE 3 MG: 10 INJECTION INTRAVENOUS at 15:41

## 2018-12-09 RX ADMIN — OXYCODONE AND ACETAMINOPHEN 1 TABLET: 5; 325 TABLET ORAL at 20:59

## 2018-12-09 RX ADMIN — KETOROLAC TROMETHAMINE 30 MG: 30 INJECTION, SOLUTION INTRAMUSCULAR at 14:42

## 2018-12-09 RX ADMIN — OXYCODONE AND ACETAMINOPHEN 1 TABLET: 5; 325 TABLET ORAL at 16:55

## 2018-12-09 ASSESSMENT — COGNITIVE AND FUNCTIONAL STATUS - GENERAL
SUGGESTED CMS G CODE MODIFIER DAILY ACTIVITY: CH
DAILY ACTIVITIY SCORE: 24
MOBILITY SCORE: 24
SUGGESTED CMS G CODE MODIFIER MOBILITY: CH

## 2018-12-09 ASSESSMENT — COPD QUESTIONNAIRES
DO YOU EVER COUGH UP ANY MUCUS OR PHLEGM?: NO/ONLY WITH OCCASIONAL COLDS OR INFECTIONS
IN THE PAST 12 MONTHS DO YOU DO LESS THAN YOU USED TO BECAUSE OF YOUR BREATHING PROBLEMS: DISAGREE/UNSURE
HAVE YOU SMOKED AT LEAST 100 CIGARETTES IN YOUR ENTIRE LIFE: NO/DON'T KNOW
DURING THE PAST 4 WEEKS HOW MUCH DID YOU FEEL SHORT OF BREATH: NONE/LITTLE OF THE TIME

## 2018-12-09 ASSESSMENT — PAIN SCALES - GENERAL
PAINLEVEL_OUTOF10: 4
PAINLEVEL_OUTOF10: 0
PAINLEVEL_OUTOF10: 4
PAINLEVEL_OUTOF10: 0
PAINLEVEL_OUTOF10: 7
PAINLEVEL_OUTOF10: 6
PAINLEVEL_OUTOF10: 4
PAINLEVEL_OUTOF10: 2
PAINLEVEL_OUTOF10: 6
PAINLEVEL_OUTOF10: 0
PAINLEVEL_OUTOF10: 0
PAINLEVEL_OUTOF10: ASSUMED PAIN PRESENT

## 2018-12-09 ASSESSMENT — PATIENT HEALTH QUESTIONNAIRE - PHQ9
1. LITTLE INTEREST OR PLEASURE IN DOING THINGS: NOT AT ALL
2. FEELING DOWN, DEPRESSED, IRRITABLE, OR HOPELESS: NOT AT ALL
SUM OF ALL RESPONSES TO PHQ9 QUESTIONS 1 AND 2: 0

## 2018-12-09 ASSESSMENT — LIFESTYLE VARIABLES
EVER_SMOKED: NEVER
ALCOHOL_USE: NO

## 2018-12-09 NOTE — OR SURGEON
Immediate Post OP Note    PreOp Diagnosis: Right proximal ureteral stone                                Right flank pain    PostOp Diagnosis: As above    Procedure(s):  CYSTOSCOPY - Wound Class: Clean Contaminated  RIGHT URETERAL CATHETERIZATION  RIGHT FLEXIBLE URETEROSCOPY WITH LASER LITHOTRIPSY - Wound Class: Clean Contaminated    Surgeon(s):  Reynaldo Bajwa M.D.    Anesthesiologist/Type of Anesthesia:  Anesthesiologist: Gerard Clemente M.D./General LMA    Surgical Staff:  Circulator: Ariella Lindsay R.N.  Scrub Person: Barrett Hebert    Specimens removed if any:  None    Estimated Blood Loss: N/A    Findings: Right proximal ureteral stone pushed into lower pole of the right renal unit with the guide wire    Complications: None  Drains: None        12/9/2018 2:07 PM Reynaldo Bajwa M.D.

## 2018-12-09 NOTE — CARE PLAN
Problem: Pain Management  Goal: Pain level will decrease to patient's comfort goal  Outcome: PROGRESSING AS EXPECTED  Patient reporting 5/10 when arrived at floor; tylenol effective.     Problem: Communication  Goal: The ability to communicate needs accurately and effectively will improve  Outcome: PROGRESSING AS EXPECTED  Patient able to communicate needs. Call light instruction provided; verbalized understanding.

## 2018-12-09 NOTE — PROGRESS NOTES
Received report from ED; assumed care at approximately 0430 when patient ambulated from gurney to bed. A&O x 4. VSS. 5/10 pain reported; see MAR. 2 RN skin check performed; bilateral ear gauges, multiple tattoos, piercing: nipples, labias, nose. Otherwise, no other skin demarcation/issues noted. Sacrum blanches. Welcome video watched. Oriented to room, call light, and fall risk. Patient verbalized understanding. Urinary hat placed in bathroom with instructions to void into it. 150 mL upon arrival noted: clear, yellow urine. Up self, stable gait noted. Admission completed. Patient understands diet of NPO with sips of medication. Bed locked/lowest position. Call light/personal belongings within reach. All needs met at present time.

## 2018-12-09 NOTE — ED PROVIDER NOTES
"ED Provider Note    Scribed for Alison Puckett M.D. by Emigdio Spears. 12/9/2018  12:01 AM    Means of arrival: Walk in  History obtained from: Patient  History limited by: None      CHIEF COMPLAINT  Chief Complaint   Patient presents with   • RLQ Pain     Pt experinecing severe pain in R LQ for one hour.  Says it is now radiating to back.         HPI  Marlys Jane is a 23 y.o. female with past medical history of nephrolithiasis who presents to the Emergency Department for evaluation of right lower quadrant pain onset tonight around 10 PM. She states the pain initially started out as \"cramping\" in quality throughout the week and was similar to cramps she has with her menstrual period, but is not currently menstruating. The patient became concerned tonight when her pain progressed in severity and is now described as \"sharp\" and \"dull\" in quality. She endorses associated nausea and vomiting, no hematemesis. The patient additionally states she has had a few hard stools, but has a prior history of constipation. She also reports a history of kidney stones, but states her pain tonight is more severe in quality than what she has experienced with kidney stones.  The patient has no concern for STI's, and denies symptoms of dysuria or vaginal discharge. No alleviating or exacerbating factors are identified at this time.     REVIEW OF SYSTEMS  Pertinent positive include right lower quadrant pain, nausea, and vomiting. Pertinent negative include hematemesis, dysuria, or vaginal discharge. All other systems reviewed and are negative.      PAST MEDICAL HISTORY   has a past medical history of Anxiety (2017); Gall stones; and Kidney disease.    SOCIAL HISTORY  Social History     Social History Main Topics   • Smoking status: Never Smoker   • Smokeless tobacco: Never Used   • Alcohol use No   • Drug use: No   • Sexual activity: Yes     Partners: Male     Birth control/ protection: Surgical, IUD      Comment: Paragard on 8/29/2019 "       SURGICAL HISTORY   has a past surgical history that includes cystoscopy stent placement (N/A, 5/17/2016); ureteroscopy (Right, 5/17/2016); and lasertripsy (Right, 5/17/2016).    CURRENT MEDICATIONS  Home Medications     Reviewed by Dianne Bolaños R.N. (Registered Nurse) on 12/08/18 at 2323  Med List Status: Partial   Medication Last Dose Status   benzonatate (TESSALON) 100 MG Cap not taking Active   PARAGARD INTRAUTERINE COPPER IUD since Sep 2018 Active                ALLERGIES  No Known Allergies    PHYSICAL EXAM   VITAL SIGNS: /76   Pulse 75   Temp 36.1 °C (97 °F) (Temporal)   Resp 18   Ht 1.524 m (5')   Wt 80.6 kg (177 lb 11.1 oz)   LMP 11/26/2018 (Approximate)   SpO2 99%   BMI 34.70 kg/m²    Constitutional: Uncomfortable however non toxic appearing. Alert in no apparent distress.  HENT: Normocephalic, Atraumatic. Bilateral external ears normal. Nose normal.  Moist mucous membranes.  Oropharynx clear.  Eyes: Pupils are equal and reactive. Conjunctiva normal.   Neck: Supple, full range of motion  Heart: Regular rate and rhythm.  No murmurs.    Lungs: No respiratory distress, normal work of breathing. Lungs clear to auscultation bilaterally.  Abdomen Soft, no distention.  Right lower quadrant tenderness to palpation.  No rebound or guarding.  Musculoskeletal: Atraumatic. No obvious deformities noted.  No lower extremity edema.  Back: No CVA tenderness.   Skin: Warm, Dry.  No erythema, No rash.   Neurologic: Alert and oriented x3. Moving all extremities spontaneously without focal deficits.  Psychiatric: Affect normal, Mood normal, Appears appropriate and not intoxicated.    DIAGNOSTIC STUDIES    LABS  Personally reviewed by me  Labs Reviewed   CBC WITH DIFFERENTIAL - Abnormal; Notable for the following:        Result Value    WBC 11.1 (*)     MCHC 32.9 (*)     Monos (Absolute) 0.88 (*)     All other components within normal limits   COMP METABOLIC PANEL - Abnormal; Notable for the following:      Total Protein 8.3 (*)     Globulin 3.7 (*)     All other components within normal limits   URINALYSIS,CULTURE IF INDICATED - Abnormal; Notable for the following:     Character Cloudy (*)     Ketones Trace (*)     Protein 100 (*)     Leukocyte Esterase Small (*)     Occult Blood Large (*)     All other components within normal limits   URINE MICROSCOPIC (W/UA) - Abnormal; Notable for the following:     WBC 10-20 (*)     RBC  (*)     Bacteria Few (*)     Hyaline Cast 3-5 (*)     All other components within normal limits   LIPASE   HCG QUAL SERUM   ESTIMATED GFR   URINE CULTURE(NEW)       RADIOLOGY  Personally reviewed by me  CT-ABDOMEN-PELVIS WITH   Final Result      1.  Mild right hydronephrosis secondary to an 8 mm proximal right ureteral stone.   2.  The appendix is not visualized, early acute appendicitis cannot be excluded.   3.  Intrauterine device.   4.  Trace free pelvic fluid, likely physiologic.          ED COURSE  Vitals:    12/08/18 2315 12/08/18 2319 12/09/18 0000 12/09/18 0100   BP: 132/76  116/55    Pulse: 75  80 80   Resp: 18  16 16   Temp: 36.1 °C (97 °F)      TempSrc: Temporal      SpO2: 99%   98%   Weight:  80.6 kg (177 lb 11.1 oz)     Height:  1.524 m (5')           Medications administered:  Medications   morphine (pf) 10 mg/mL injection 6 mg (6 mg Intravenous Given 12/9/18 0022)   ondansetron (ZOFRAN) syringe/vial injection 4 mg (4 mg Intravenous Given 12/9/18 0030)   iohexol (OMNIPAQUE) 350 mg/mL (100 mL Intravenous Given 12/9/18 0058)       12:01 AM Patient seen and examined at bedside. The patient presents with right lower quadrant pain. Ordered for CT-Abdomen, Urine Microscopic, Estimated GFR, CMP, Lipase, Urinalysis, HCG Qual, CBC with differential, and Urine Culture to evaluate. Patient will be treated with Morphine 6 mg and Zofran 4 mg for her symptoms.       MEDICAL DECISION MAKING  Patient with history of prior nephrolithiasis who presents with acute onset of right lower  quadrant pain this evening.  She is afebrile with normal vitals on arrival however uncomfortable appearing due to pain.  Urinalysis demonstrates evidence of hematuria without significant signs of infection.  Patient is not pregnant.  Labs show mild leukocytosis however otherwise reassuring.  CT scan shows a 8 mm proximal ureter stone with associated mild hydronephrosis.  Appendix is not visualized however doubt appendicitis, ovarian torsion, ovarian cyst.    1:18 AM - Patient reevaluated at bedside. She reports to be feeling better. Patient was updated with labs and radiology results that show a large right ureteral stone. She was informed I will be consulting with Urology to determine a plan of care.     1:34 AM - Consult with Dr. Bajwa, Urology, who wishes to have the patient admitted to the Hospitalist and kept NPO. He will see the patient in the morning.    2:43 AM - Consult with Dr. Mckeon, [facility], who agrees to admit the patient. Patient is stable for transfer to the floor.         DISPOSITION:  Patient will be admitted to [facility] in guarded condition.    IMPRESSION  1. Nephrolithiasis    2. Hydronephrosis with urinary obstruction due to ureteral calculus        Results, diagnoses, and treatment options were discussed with the patient and/or family. Patient verbalized understanding of plan of care.    New Prescriptions    No medications on file            Emigdio MONTALVO), am scribing for, and in the presence of, Alison Puckett M.D..    Electronically signed by: Emigdio Spears (Joe), 12/9/2018    Alison MONTALVO M.D. personally performed the services described in this documentation, as scribed by Emigdio Spears in my presence, and it is both accurate and complete.    C.    The note accurately reflects work and decisions made by me.  Alison Puckett  12/9/2018  7:10 AM

## 2018-12-09 NOTE — PROGRESS NOTES
Pt to go to sx at 1500. Discussed body piercings, pt has new piercings present bilaterally on breasts and labia and is hesitant to remove them. Contacted Pre-op to discuss issue, was informed that piercings could stay in place as long as they were taped. Pt informed.

## 2018-12-09 NOTE — PROGRESS NOTES
Med rec completed per pt.   Antibiotics within last 30 days: No  Patient allergies have been reviewed: MORRIS  Comments: No changes made to med rec.

## 2018-12-09 NOTE — OP REPORT
DATE OF SERVICE:  12/09/2018    PREOPERATIVE DIAGNOSES:  1.  Right proximal ureteral stone.  2.  Right flank pain.    POSTOPERATIVE DIAGNOSES:  1.  Right proximal ureteral stone.  2.  Right flank pain.    OPERATION/PROCEDURES PERFORMED:  1.  Rigid cystourethroscopy.  2.  Right ureteral catheterization with advancement of guidewire.  3.  Right flexible ureteroscopy with holmium laser lithotripsy of 8-9 mm   proximal ureteral stone pushed back into the kidney.    SURGEON:  Reynaldo Bajwa MD    ANESTHESIA:  General laryngeal mask.    ANESTHESIOLOGIST:  Gerard Clemente MD    COMPLICATIONS:  None.    DRAINS:  None.    INDICATIONS:  The patient is a 23-year-old with right flank pain.  She was   evaluated and has a 9 mm proximal ureteral stone with obstruction.  She was   counseled about treatment options.  She is interested in proceeding with   cystoscopy, ureteroscopy, laser lithotripsy with possible stent.  All risks,   benefits, and alternatives were discussed and documented in my consultation.    DESCRIPTION IN DETAIL:  After informed consent had been given to me by the   patient to proceed.  She was brought to the operating room and placed supine.    Bilateral sequential compression devices were in place and activated.  A   general laryngeal mask anesthetic was administered in balanced fashion.  She   received intravenous Ancef.  She was positioned in modified lithotomy.  The   operative area was Betadine prepped and draped in sterile fashion.  A surgical   time-out was called.  All members of the operative team agree as the   patient's name and procedure to be performed without objections, attention was   directed to the procedure.    I began the procedure by passing a 25-Citizen of [place] rigid cystoscope per urethra.    Evaluation of bladder shows no evidence of stone, tumor, or diverticula.    Right ureteral orifice was orthotopic, has no reflux noted.  Left ureteral   orifice did exhibit reflux.  I attempted to  cannulate the right ureteral   orifice with a 0.035 sensor wire, but I could not access the ureter, so an   open-ended ureteral catheter 6-[place] was inserted over the wire and then with   this placed near the ureteral orifice, the wire was advanced up into the   kidney.  During this, it appears the stone was pushed out of the proximal   ureter into the lower pole of the kidney.  Subsequently, with the safety wire   in place, the ureteral access sheath, 11-[place] tapered to 13-[place]i used to   perform ureteroscopy.  I did pass the inner obturator first and the entire   access sheath.  Once the dilation was performed, the guidewire and inner   obturator removed and flexible ureteroscope, which was of [employer]   was then passed with hydrodistention up into the proximal ureter.  The stone   was not seen.  It was in the lower pole of the kidney.  A 200 micron laser   fiber was used.  Utilizing a frequency of originally 5 Hz and 400 millijoules,   the stone was fragmented.  I then increased the frequency to 40 Hz and 0.2   millijoules and turned the stone into complete powder.  After the stone had   completely been treated, I used hydrodistention and with the laser in a   standby mode, withdrew the scope and powder was draining from the kidney with   very atraumatic ureteral access sheath placement.  Subsequently, I elected not   to leave a stent, but I did withdraw the ureteroscope examining the entire   ureter, which showed no evidence of trauma or edema and the distal ureter was   widely patent with high pressure efflux of powder and blood from the kidney   after treatment.  I drained the bladder.  She tolerated the procedure well   without any complications.  She was awakened in the operating room and   transferred to the recovery room where she arrived in stable condition.       ____________________________________     MD MARCO ANTONIO Weiss / [place]    DD:  12/09/2018 15:03:42  DT:  12/09/2018  15:23:18    D#:  [id number]  Job#:  [id number]

## 2018-12-09 NOTE — ED TRIAGE NOTES
Chief Complaint   Patient presents with   • RLQ Pain     Pt experinecing severe pain in R LQ for one hour.  Says it is now radiating to back.       Pt ambulated to triage room.  Pt has h/o kidney stones but says this is different than that pain.  She did experience uterine cramping about 4 hours ago, and then the pain started.  She has had a paraguard IUD in place x 4 months.  LMP was 2 weeks, no vaginal bleeding occurring tonight.       Appears in distress, holding R side and grimacing in pain.  States her last BM was this evening, denies any problems with urination.  States she has been nauseous x 1 hour.

## 2018-12-09 NOTE — PROGRESS NOTES
23 year old with right flank pain and right 9mm proximal ureteral stone. Discussed with the patient the treatment options and she wishes to proceed with rigid cystoscopy with right flexible ureteroscopy with laser lithotripsy and possible right stent. We discussed the risks and benefits of the procedures and these are documented in my consultation.  Informed consent has been given to me to proceed by Miss Jane.

## 2018-12-09 NOTE — PROGRESS NOTES
Report received from NOC shift RN.   A/O X 4. Room air.   VSS. Labs reviewed.   Pt states pain level is 4/10 around groin area.  Denies need for    BS normoactive X 4.   Last BM 12/8/2018. +void.   Pt is NPO and awaiting consult from Dr. Bajwa.   Discussed pending POC.   PIV on left AC running NS w/20K running at 125mL/hr.   Pt is ambulating up self.   Call light at bedside.

## 2018-12-09 NOTE — H&P
[facility] [place] [facility]     PATIENT ID:  NAME:  Marlys Jane  MRN:               [id number]  YOB: 1995    Date of Admission: 12/8/2018     Attending: Reynaldo Boykin MD    Resident: Richa Mckeon PGY3    Primary Care Physician:  Dr. Cheema    CC:  Flank and pelvic pain    HPI: Marlys Jane is a 23 y.o. Female with a PMHx of nephrolithiasis requiring lithotrispy who presented to the ER with right flank and right lower quadrant pain. She reports menstrual-like cramping pain X 1.5 weeks and reports an acute worsening of pain tonight staring around 10PM. She now reports the pain is a severe sharp stabbing pain in her right flank. She has had nausea, vomiting, subjective fever and chills as well tonight. She denies dysuria, urgency, increased frequency, or hematuria. The patient reports she otherwise feels well and denies headache, cough, congestion, chest pain, shortness of breath, lower ext edema, diarrhea, or constipation. Her LMP was about one month ago and she has an IUD for contraception.    ED course: CT abdomen pelvis demonstrated mild right hydronephrosis secondary to an 8 mm proximal right ureteral stone. WBC mildly elevated at 11.7. VSS and WNL. Dr. Bajwa, urologist, was consulted by ED physician; he will see the patient in the morning and asked the patient be kept NPO. The patient received morphine 6mg IVP for pain.     REVIEW OF SYSTEMS:   Ten systems reviewed and were negative except as noted in the HPI.                PAST MEDICAL HISTORY:  Past Medical History:   Diagnosis Date   • Anxiety 2017   • Gall stones    • Kidney disease     kidney stones       PAST SURGICAL HISTORY:  Past Surgical History:   Procedure Laterality Date   • CYSTOSCOPY STENT PLACEMENT N/A 5/17/2016    Procedure: CYSTOSCOPY STENT PLACEMENT;  Surgeon: Sherif Lees M.D.;  Location: SURGERY [facility];  Service:    • URETEROSCOPY Right 5/17/2016    Procedure: URETEROSCOPY;  Surgeon: Sherif Lees  M.D.;  Location: SURGERY [facility];  Service:    • LASERTRIPSY Right 5/17/2016    Procedure: LASERTRIPSY LITHO;  Surgeon: Sherif Lees M.D.;  Location: SURGERY [facility];  Service:        FAMILY HISTORY:  Family History   Problem Relation Age of Onset   • No Known Problems Maternal Grandmother    • No Known Problems Maternal Grandfather    • No Known Problems Paternal Grandmother    • No Known Problems Paternal Grandfather        SOCIAL HISTORY:   The patient lives in [place] with her four children. She works at a  center. No tobacco, alcohol, or recreational drug use.    DIET:   Orders Placed This Encounter   Procedures   • Diet NPO     Standing Status:   Standing     Number of Occurrences:   1     Order Specific Question:   Restrict to:     Answer:   Sips with Medications [3]       ALLERGIES:  No Known Allergies    OUTPATIENT MEDICATIONS:    Current Facility-Administered Medications:   •  senna-docusate (PERICOLACE or SENOKOT S) 8.6-50 MG per tablet 2 Tab, 2 Tab, Oral, BID **AND** polyethylene glycol/lytes (MIRALAX) PACKET 1 Packet, 1 Packet, Oral, QDAY PRN **AND** magnesium hydroxide (MILK OF MAGNESIA) suspension 30 mL, 30 mL, Oral, QDAY PRN **AND** bisacodyl (DULCOLAX) suppository 10 mg, 10 mg, Rectal, QDAY PRN, Richa Mckeon M.D.  •  0.9 % NaCl with KCl 20 mEq infusion, , Intravenous, Continuous, Richa Mckeon M.D.  •  [START ON 12/10/2018] enoxaparin (LOVENOX) inj 40 mg, 40 mg, Subcutaneous, DAILY, Richa Mckeon M.D.  •  acetaminophen (TYLENOL) tablet 650 mg, 650 mg, Oral, Q6HRS PRN, Richa Mckeon M.D.  •  cefTRIAXone (ROCEPHIN) 1 g in  mL IVPB, 1 g, Intravenous, Q24HRS, Richa Mckeon M.D.    Current Outpatient Prescriptions:   •  PARAGARD INTRAUTERINE COPPER IUD, by Intrauterine route., Disp: , Rfl:   •  benzonatate (TESSALON) 100 MG Cap, Take 1 Cap by mouth 3 times a day as needed for Cough., Disp: 15 Cap, Rfl: 0    PHYSICAL EXAM:  Vitals:    12/09/18 0000  18 0100 18 0200 18 0314   BP: 116/55      Pulse: 80 80 78 91   Resp: 16 16 16 16   Temp:       TempSrc:       SpO2:  98% 97% 99%   Weight:       Height:       , Temp (24hrs), Av.1 °C (97 °F), Min:36.1 °C (97 °F), Max:36.1 °C (97 °F)  , Pulse Oximetry: 99 %, O2 (LPM): 0, O2 Delivery: None (Room Air)    General: Pt resting in NAD, comfortable, well-appearing female  Skin:  Pink, warm and dry.  No rashes  HEENT: NC/AT. PERRL. EOMI. MMM. No nasal discharge. Oropharynx nonerythematous without exudate/plaques  Neck:  Supple without lymphadenopathy or rigidity. No JVD   Lungs:  Symmetrical.  CTAB with no W/R/R.  Good air movement   Cardiovascular:  S1/S2 RRR without M/R/G.  Abdomen:  Abdomen is soft NT/ND. +BS. No masses noted. No rebound or guarding. No CVA tenderness  Extremities:  Full range of motion. No gross deformities noted. 2+ pulses in all extremities. No C/C/E   Spine:  Straight without vertebral anomalies.  CNS:  Muscle tone is normal. Cranial nerves II-XII grossly intact. 2+ DTRs.         LAB TESTS:   Recent Labs      18   WBC  11.1*   RBC  4.68   HEMOGLOBIN  13.9   HEMATOCRIT  42.3   MCV  90.4   MCH  29.7   RDW  41.7   PLATELETCT  256   MPV  9.3   NEUTSPOLYS  52.70   LYMPHOCYTES  37.40   MONOCYTES  7.90   EOSINOPHILS  1.40   BASOPHILS  0.30         Recent Labs      18   SODIUM  137   POTASSIUM  3.6   CHLORIDE  105   CO2  23   BUN  13   CREATININE  0.83   CALCIUM  10.3   ALBUMIN  4.6       CULTURES:   Results     Procedure Component Value Units Date/Time    URINE CULTURE(NEW) [[id number]] Collected:  18    Order Status:  Completed Updated:  18    URINALYSIS CULTURE, IF INDICATED [[id number]]  (Abnormal) Collected:  18    Order Status:  Completed Specimen:  Urine Updated:  18     Color Yellow     Character Cloudy (A)     Specific Gravity 1.028     Ph 5.0     Glucose Negative mg/dL      Ketones Trace (A) mg/dL      Protein  100 (A) mg/dL      Bilirubin Negative     Urobilinogen, Urine 1.0     Nitrite Negative     Leukocyte Esterase Small (A)     Occult Blood Large (A)     Micro Urine Req Microscopic          IMAGES:  CT abd/pelvis w/ con:  1.  Mild right hydronephrosis secondary to an 8 mm proximal right ureteral stone.  2.  The appendix is not visualized, early acute appendicitis cannot be excluded.  3.  Intrauterine device.  4.  Trace free pelvic fluid, likely physiologic.    CONSULTS:   Urology    ASSESSMENT/PLAN: 23 y.o. female admitted for 8mm right ureteral stone.      # Right Ureteral Stone  - patient reports history of right kidney stone requiring lithotripsy and stenting about 3-4 years ago  - flank and RLQ pain X 1.5 weeks with acute worsening tonight  - associated subjective fevers, chills, nausea and vomiting tonight  - no dysuria, urgency, increased frequency or hematuria  - UA: 10-20 WBC,  RBCs, few bacteria, nitrite negative  - CT scan demonstrates 8mm proximal right ureteral stone with mild hydronephrosis  - Urology, Dr. Bajwa, consulted by ED physician, will see in AM   - NPO per urology, will see in AM   - IVFs @ maintenance   - s/p morphine 6mg IVP in ED   - Toradol 30mg IVP Q6H PRN pain   - morphine 3mg IVP Q2H PRN breakthrough pain   - will cover with ceftriaxone given subjective fevers, chills, and mild leukocytosis    Core Measures  Lines: PIV  Abx: Ceftriaxone 1g Q24H  DVT ppx: lovenox, hold AM dose for possible procedure  GI ppx: bowel protocol  Diet: NPO  PCP: Dr. Cheema    Code Status: Full Code    Dispo: Observation admit for right ureteral stone

## 2018-12-09 NOTE — CONSULTS
DATE OF SERVICE:  12/09/2018    UROLOGIC CONSULTATION    CONSULTATION REQUESTED BY:  Alison Puckett MD    REASON FOR CONSULTATION:  Right flank pain and right proximal ureteral stone.    CONSULTATION PERFORMED BY:  Reynaldo Bajwa MD, [facility]    CHIEF COMPLAINT:  This is a 23-year-old woman with right flank pain, presented   to the ER for work up and has a 9 mm proximal ureteral stone.    HISTORY OF PRESENT ILLNESS:  Patient states that she has had off and on   cramping right lower quadrant abdominal pain and flank pain for approximately   10 days.  However, her pain became quite severe late in the evening on   12/08/2018.  She sought consultation in the emergency room and was seen early   in the morning of 12/09 where a CAT scan showed a 9 mm proximal ureteral stone   with associated hydronephrosis.  Patient denied any gross hematuria, urgency,   frequency, but did have some nausea, but denied emesis.    PAST MEDICAL HISTORY:  Her past medical history is noteworthy for recurrent   nephrolithiasis, history of cholelithiasis, and anxiety.    PAST SURGICAL HISTORY:  She has had a prior ureteroscopy, laser lithotripsy.    FAMILY HISTORY:  Negative for nephrolithiasis.    MEDICATIONS:  None.    ALLERGIES:  She has no known drug allergies.    SOCIAL HISTORY:  She lives in [place], has 4 children.  She works at a    center.  Denies alcohol, tobacco, or drug use.    REVIEW OF SYSTEMS:  GENERAL:  She denies any recent weight loss or weight gain, any fever or   chills.  HEENT:  Denies any visual changes, any difficulty swallowing.  RESPIRATORY:  Denies shortness of breath, cough, or hemoptysis.  CARDIOVASCULAR:  Denies skipped beats or chest pain.  ABDOMEN:  She has had some right lower quadrant abdominal pain and nausea, but   no emesis or blood in the stool.  GENITOURINARY:  See HPI.  MUSCULOSKELETAL:  Denies arthralgias or myalgias.  NEUROLOGIC:  Denies any seizure, discoordination, or  headaches.  PSYCHIATRIC:  Denies depression or anxiety.  ENDOCRINE:  Denies excessive feeling of too hot or too cold or polyuria.    PHYSICAL EXAMINATION:  GENERAL:  She is a well-developed, well-nourished woman, in no acute distress.  HEENT:  Normocephalic, atraumatic.  Pupils equal, round.  Sclerae nonicteric.  NECK:  Supple, without adenopathy.  CHEST:  Clear to auscultation in all lung fields.  CARDIOVASCULAR:  Regular rate and rhythm without murmur.  ABDOMEN:  Soft, nontender, nondistended.  She has normoactive bowel sounds.    No rebound or guarding.  BACK:  Without costovertebral angle tenderness.  EXTREMITIES:  Without clubbing, cyanosis, or edema.  NEUROLOGIC:  Cranial nerves II-XII intact.  Motor and sensory examination   nonfocal.  PSYCHIATRIC:  Appropriate mood and judgment.    LABORATORIES AND STUDIES:  Her white blood cell count is 11,100, hemoglobin   13.9.  BUN and creatinine 13 and 0.83.  Urinalysis shows trace white cells   with leukocyte esterase positive, but nitrite negative urine and no   significant bacteria noted.  CT scan, I personally reviewed, shows a 9 mm   proximal ureteral stone on the right side with associated hydronephrosis.    ASSESSMENT:  Right proximal ureterolithiasis with pain.    PLAN AND RECOMMENDATIONS:  I explained to the patient the treatment options.    This stone is unlikely to pass with medical expulsive therapy due to its large   size.  She is interested in treatment due to pain and we discussed the option   of cystoscopy, ureteroscopy, laser lithotripsy with possible stent.  She is   relatively adverse to the stent having had pain in the past and we discussed   the risks of procedure including but not limited to risk of perioperative   urinary tract infection, urosepsis, ureteral perforation.  If a stent is   necessary, there is a risk of migration, urgency, frequency, and pain.    Without a stent, she is aware she may have pain prolonging her hospitalization   for  pain management and she has sepsis.  We have also discussed the potential   need for secondary procedures, the perioperative risk of deep vein   thrombosis, pulmonary embolism, aspiration pneumonia, and death.  Informed   consent was given to me by the patient to proceed with cystoscopy, right   flexible ureteroscopy, laser lithotripsy with or without stent.       ____________________________________     MD MARCO ANTONIO Weiss / [place]    DD:  12/09/2018 13:28:51  DT:  12/09/2018 15:04:18    D#:  [id number]  Job#:  [id number]

## 2018-12-09 NOTE — ED NOTES
Pt medicated per MAR by this RN. Michelle raised. Family at bedside. Pt in nad at this time. Call light in reach.

## 2018-12-10 ENCOUNTER — PATIENT OUTREACH (OUTPATIENT)
Dept: HEALTH INFORMATION MANAGEMENT | Facility: OTHER | Age: 23
End: 2018-12-10

## 2018-12-10 VITALS
SYSTOLIC BLOOD PRESSURE: 109 MMHG | OXYGEN SATURATION: 95 % | RESPIRATION RATE: 17 BRPM | HEIGHT: 60 IN | HEART RATE: 67 BPM | BODY MASS INDEX: 34.89 KG/M2 | WEIGHT: 177.69 LBS | TEMPERATURE: 98 F | DIASTOLIC BLOOD PRESSURE: 64 MMHG

## 2018-12-10 LAB
ANION GAP SERPL CALC-SCNC: 6 MMOL/L (ref 0–11.9)
BASOPHILS # BLD AUTO: 0.2 % (ref 0–1.8)
BASOPHILS # BLD: 0.03 K/UL (ref 0–0.12)
BUN SERPL-MCNC: 9 MG/DL (ref 8–22)
CALCIUM SERPL-MCNC: 8.9 MG/DL (ref 8.5–10.5)
CHLORIDE SERPL-SCNC: 108 MMOL/L (ref 96–112)
CO2 SERPL-SCNC: 26 MMOL/L (ref 20–33)
CREAT SERPL-MCNC: 0.69 MG/DL (ref 0.5–1.4)
EOSINOPHIL # BLD AUTO: 0.02 K/UL (ref 0–0.51)
EOSINOPHIL NFR BLD: 0.1 % (ref 0–6.9)
ERYTHROCYTE [DISTWIDTH] IN BLOOD BY AUTOMATED COUNT: 43.4 FL (ref 35.9–50)
GLUCOSE SERPL-MCNC: 87 MG/DL (ref 65–99)
HCT VFR BLD AUTO: 40.9 % (ref 37–47)
HGB BLD-MCNC: 12.7 G/DL (ref 12–16)
IMM GRANULOCYTES # BLD AUTO: 0.06 K/UL (ref 0–0.11)
IMM GRANULOCYTES NFR BLD AUTO: 0.4 % (ref 0–0.9)
LYMPHOCYTES # BLD AUTO: 3.54 K/UL (ref 1–4.8)
LYMPHOCYTES NFR BLD: 23.4 % (ref 22–41)
MCH RBC QN AUTO: 29.4 PG (ref 27–33)
MCHC RBC AUTO-ENTMCNC: 31.1 G/DL (ref 33.6–35)
MCV RBC AUTO: 94.7 FL (ref 81.4–97.8)
MONOCYTES # BLD AUTO: 1.08 K/UL (ref 0–0.85)
MONOCYTES NFR BLD AUTO: 7.1 % (ref 0–13.4)
NEUTROPHILS # BLD AUTO: 10.42 K/UL (ref 2–7.15)
NEUTROPHILS NFR BLD: 68.8 % (ref 44–72)
NRBC # BLD AUTO: 0 K/UL
NRBC BLD-RTO: 0 /100 WBC
PLATELET # BLD AUTO: 264 K/UL (ref 164–446)
PMV BLD AUTO: 9.6 FL (ref 9–12.9)
POTASSIUM SERPL-SCNC: 3.8 MMOL/L (ref 3.6–5.5)
RBC # BLD AUTO: 4.32 M/UL (ref 4.2–5.4)
SODIUM SERPL-SCNC: 140 MMOL/L (ref 135–145)
WBC # BLD AUTO: 15.2 K/UL (ref 4.8–10.8)

## 2018-12-10 PROCEDURE — 96376 TX/PRO/DX INJ SAME DRUG ADON: CPT

## 2018-12-10 PROCEDURE — 96372 THER/PROPH/DIAG INJ SC/IM: CPT | Mod: XU

## 2018-12-10 PROCEDURE — 80048 BASIC METABOLIC PNL TOTAL CA: CPT

## 2018-12-10 PROCEDURE — 700102 HCHG RX REV CODE 250 W/ 637 OVERRIDE(OP): Performed by: STUDENT IN AN ORGANIZED HEALTH CARE EDUCATION/TRAINING PROGRAM

## 2018-12-10 PROCEDURE — 700102 HCHG RX REV CODE 250 W/ 637 OVERRIDE(OP): Performed by: FAMILY MEDICINE

## 2018-12-10 PROCEDURE — 700111 HCHG RX REV CODE 636 W/ 250 OVERRIDE (IP): Performed by: FAMILY MEDICINE

## 2018-12-10 PROCEDURE — A9270 NON-COVERED ITEM OR SERVICE: HCPCS | Performed by: STUDENT IN AN ORGANIZED HEALTH CARE EDUCATION/TRAINING PROGRAM

## 2018-12-10 PROCEDURE — G0378 HOSPITAL OBSERVATION PER HR: HCPCS

## 2018-12-10 PROCEDURE — 700105 HCHG RX REV CODE 258: Performed by: FAMILY MEDICINE

## 2018-12-10 PROCEDURE — A9270 NON-COVERED ITEM OR SERVICE: HCPCS | Performed by: FAMILY MEDICINE

## 2018-12-10 PROCEDURE — 36415 COLL VENOUS BLD VENIPUNCTURE: CPT

## 2018-12-10 PROCEDURE — 85025 COMPLETE CBC W/AUTO DIFF WBC: CPT

## 2018-12-10 PROCEDURE — 96365 THER/PROPH/DIAG IV INF INIT: CPT

## 2018-12-10 PROCEDURE — 700101 HCHG RX REV CODE 250: Performed by: FAMILY MEDICINE

## 2018-12-10 RX ORDER — OXYCODONE HYDROCHLORIDE AND ACETAMINOPHEN 5; 325 MG/1; MG/1
1 TABLET ORAL EVERY 4 HOURS PRN
Qty: 15 TAB | Refills: 0 | Status: SHIPPED | OUTPATIENT
Start: 2018-12-10 | End: 2018-12-13

## 2018-12-10 RX ORDER — SULFAMETHOXAZOLE AND TRIMETHOPRIM 800; 160 MG/1; MG/1
1 TABLET ORAL 2 TIMES DAILY
Qty: 10 TAB | Refills: 0 | Status: SHIPPED | OUTPATIENT
Start: 2018-12-10 | End: 2018-12-15

## 2018-12-10 RX ADMIN — STANDARDIZED SENNA CONCENTRATE AND DOCUSATE SODIUM 2 TABLET: 8.6; 5 TABLET, FILM COATED ORAL at 05:51

## 2018-12-10 RX ADMIN — POTASSIUM CHLORIDE AND SODIUM CHLORIDE: 900; 150 INJECTION, SOLUTION INTRAVENOUS at 00:36

## 2018-12-10 RX ADMIN — ENOXAPARIN SODIUM 40 MG: 100 INJECTION SUBCUTANEOUS at 05:50

## 2018-12-10 RX ADMIN — CEFTRIAXONE SODIUM 1 G: 1 INJECTION, POWDER, FOR SOLUTION INTRAMUSCULAR; INTRAVENOUS at 05:50

## 2018-12-10 RX ADMIN — OXYCODONE AND ACETAMINOPHEN 1 TABLET: 5; 325 TABLET ORAL at 05:54

## 2018-12-10 RX ADMIN — OXYCODONE AND ACETAMINOPHEN 1 TABLET: 5; 325 TABLET ORAL at 13:19

## 2018-12-10 ASSESSMENT — PAIN SCALES - GENERAL
PAINLEVEL_OUTOF10: 6
PAINLEVEL_OUTOF10: 5
PAINLEVEL_OUTOF10: 4
PAINLEVEL_OUTOF10: 2

## 2018-12-10 NOTE — PROGRESS NOTES
Urology Progress Note     Right proximal ureteral stone- Right flank pain    Post op day #1 :  CYSTOSCOPY - Wound Class: Clean Contaminated  RIGHT URETERAL CATHETERIZATION  RIGHT FLEXIBLE URETEROSCOPY WITH LASER LITHOTRIPSY     Interval Events: None    S: No fevers, chills, nausea or vomiting.  +flatus.Denies any SOB, or chest pain. Denies sommer lower leg pain or edema.  Pt denies abdominal pain.     O:   Blood pressure 109/64, pulse 67, temperature 36.7 °C (98 °F), temperature source Temporal, resp. rate 17, height 1.524 m (5'), weight 80.6 kg (177 lb 11.1 oz), last menstrual period 11/26/2018, SpO2 95 %, not currently breastfeeding.  Recent Labs      12/09/18   0012  12/10/18   1226   SODIUM  137  140   POTASSIUM  3.6  3.8   CHLORIDE  105  108   CO2  23  26   GLUCOSE  89  87   BUN  13  9   CREATININE  0.83  0.69   CALCIUM  10.3  8.9     Recent Labs      12/09/18   0012  12/10/18   1226   WBC  11.1*  15.2*   RBC  4.68  4.32   HEMOGLOBIN  13.9  12.7   HEMATOCRIT  42.3  40.9   MCV  90.4  94.7   MCH  29.7  29.4   MCHC  32.9*  31.1*   RDW  41.7  43.4   PLATELETCT  256  264   MPV  9.3  9.6         Intake/Output Summary (Last 24 hours) at 12/10/18 1329  Last data filed at 12/10/18 1230   Gross per 24 hour   Intake             1320 ml   Output             1951 ml   Net             -631 ml     Physical Exam   Constitutional: She is oriented to person, place, and time and well-developed, well-nourished, and in no distress. No distress.   HENT:   Head: Normocephalic and atraumatic.   Eyes: Pupils are equal, round, and reactive to light.   Neck: Normal range of motion. Neck supple.   Cardiovascular: Normal rate and regular rhythm.    Pulmonary/Chest: Effort normal. No respiratory distress. She has no wheezes.   Abdominal: Soft. Bowel sounds are normal. She exhibits no distension. There is no tenderness. There is no rebound.   Genitourinary:   Genitourinary Comments: Urine clear yellow   Musculoskeletal: Normal range of motion.    Neurological: She is alert and oriented to person, place, and time.   Skin: Skin is warm. She is not diaphoretic.   Psychiatric: Affect normal.     A/P:  There are no active hospital problems to display for this patient.      Stable.   Ambulate, IS.  Pt okay to dc home.  [facility] will contact pt to schedule post op follow up appointment for 6 months with K[facility].  Plan of care discussed and directed by MD Nabeel Thomas, A.P.R.N.

## 2018-12-10 NOTE — PROGRESS NOTES
[facility] [place]     Attending: Reynaldo Boykin MD    Resident: Jeny Gaspar MD, PGY-1    PATIENT: Maryls Jane; [id number]; 1995    ID:23 y.o. female admitted for 9mm right ureteral stone.    SUBJECTIVE: No acute events overnight. Pain well controlled. Tolerating regular diet. Voiding. + Flatus. No BM yet. Wanting to go home.     OBJECTIVE:     Vitals:    12/09/18 1914 12/09/18 2321 12/10/18 0313 12/10/18 0340   BP: 131/84 120/61 (!) 92/49 108/60   Pulse: 85 75 85 68   Resp: 18 17 18    Temp: 36.3 °C (97.4 °F) 36.6 °C (97.9 °F) 36.1 °C (96.9 °F)    TempSrc: Temporal Temporal Temporal    SpO2: 97% 97% 96%    Weight:       Height:           Intake/Output Summary (Last 24 hours) at 12/10/18 0550  Last data filed at 12/10/18 0313   Gross per 24 hour   Intake             1200 ml   Output             1401 ml   Net             -201 ml       PE:  General: No acute distress, resting comfortably in bed- smiling  HEENT: NC/AT. PERRLA. EOMI. MMM  Cardiovascular: RRR with no M/R/G.  Respiratory: Symmetrical chest. CTAB with no W/R/R  Abdomen: soft, NT/ND, no masses, +BS   EXT:  DUKES, %/5 strength, No C/C/E 2+ pulses   Neuro: non focal with no numbness, tingling or changes in sensation    LABS:  Recent Labs      12/09/18   0012   WBC  11.1*   RBC  4.68   HEMOGLOBIN  13.9   HEMATOCRIT  42.3   MCV  90.4   MCH  29.7   RDW  41.7   PLATELETCT  256   MPV  9.3   NEUTSPOLYS  52.70   LYMPHOCYTES  37.40   MONOCYTES  7.90   EOSINOPHILS  1.40   BASOPHILS  0.30     Recent Labs      12/09/18   0012   SODIUM  137   POTASSIUM  3.6   CHLORIDE  105   CO2  23   BUN  13   CREATININE  0.83   CALCIUM  10.3   ALBUMIN  4.6     Estimated GFR/CRCL = Estimated Creatinine Clearance: 97.3 mL/min (by C-G formula based on SCr of 0.83 mg/dL).  Recent Labs      12/09/18   0012   GLUCOSE  89     Recent Labs      12/09/18   0012   ASTSGOT  23   ALTSGPT  24   TBILIRUBIN  0.3   ALKPHOSPHAT  71   GLOBULIN  3.7*             No results for input(s):  INR, APTT, FIBRINOGEN in the last 72 hours.    Invalid input(s): DIMER    MICROBIOLOGY: None    IMAGING: None New    MEDS:  Current Facility-Administered Medications   Medication Last Dose   • senna-docusate (PERICOLACE or SENOKOT S) 8.6-50 MG per tablet 2 Tab 2 Tab at 12/09/18 1655    And   • polyethylene glycol/lytes (MIRALAX) PACKET 1 Packet      And   • magnesium hydroxide (MILK OF MAGNESIA) suspension 30 mL      And   • bisacodyl (DULCOLAX) suppository 10 mg     • 0.9 % NaCl with KCl 20 mEq infusion     • enoxaparin (LOVENOX) inj 40 mg     • acetaminophen (TYLENOL) tablet 650 mg 650 mg at 12/09/18 0501   • cefTRIAXone (ROCEPHIN) 1 g in  mL IVPB 1 g at 12/10/18 0550   • ketorolac (TORADOL) injection 30 mg     • morphine (pf) 10 mg/mL injection 3 mg 3 mg at 12/09/18 1541   • oxyCODONE-acetaminophen (PERCOCET) 5-325 MG per tablet 1 Tab 1 Tab at 12/09/18 2059   • ondansetron (ZOFRAN) syringe/vial injection 4 mg 4 mg at 12/09/18 1731       PROBLEM LIST:  No problems updated.    ASSESSMENT/PLAN: 23 y.o. female admitted for 9mm right ureteral stone.      # Right Ureteral Stone  # H/o Medullary Sponge kidneys  - patient reports history of right kidney stone requiring lithotripsy and stenting about 3-4 years ago  - flank and RLQ pain X 1.5 weeks with acute worsening tonight  - associated subjective fevers, chills, nausea and vomiting tonight  - no dysuria, urgency, increased frequency or hematuria  - UA: 10-20 WBC,  RBCs, few bacteria, nitrite negative  - CT scan demonstrates 9mm proximal right ureteral stone with mild hydronephrosis  - Urology, Dr. Bajwa, POD#1 for cystoscopy with lithotripsy there was no stenting mentioned              -Tolerating PO diet   - Pain well controlled   - Making urine 1.4L/24 hours   - IVFs @ maintenance: will titrate down to day as tolerating PO               - Toradol 30mg IVP Q6H PRN pain              - morphine 3mg IVP Q2H PRN breakthrough pain              - Ceftriaxone for  subjective fevers, chills, and mild leukocytosis day#2   - Would anticipate a 7-10 day course of PO ABX (Augmentin v Bactrim) for above symptoms    Plan:  - Ok to discharge home today if ok with Urology  - Will call Urology today for recommendations for continue ABX therapy and follow up     Core Measures  Code: FULL  Lines: PIV  Abx: Ceftriaxone 1g Q24H  DVT ppx: lovenox,  GI ppx: bowel protocol  Diet: Regular  PCP: Dr. Cheema    Dispo: Pending clearance form Urology for discharge- will follow up today    Jeny Gaspar MD, PGY-1

## 2018-12-10 NOTE — DISCHARGE INSTRUCTIONS
Discharge Instructions    Discharged to home by car with relative. Discharged via walking, hospital escort: Refused.  Special equipment needed: Not Applicable    Be sure to schedule a follow-up appointment with your primary care doctor or any specialists as instructed.     Discharge Plan:     Please return to the emergency department or see your primary care physician if your symptoms worsen or for any new problems, questions or concerns.     Please follow-up with your primary care provider within the next week if possible.     Please follow-up with any other specialist you have been instructed to follow-up with while in the hospital.       Please take your medications as prescribed    Diet Plan: Discussed  Activity Level: Discussed  Confirmed Follow up Appointment: Patient to Call and Schedule Appointment  Confirmed Symptoms Management: Discussed  Medication Reconciliation Updated: Yes  Influenza Vaccine Indication: Patient Refuses    I understand that a diet low in cholesterol, fat, and sodium is recommended for good health. Unless I have been given specific instructions below for another diet, I accept this instruction as my diet prescription.   Other diet: Regular      Special Instructions: None    · Is patient discharged on Warfarin / Coumadin?   No     Lithotripsy, Care After  Refer to this sheet in the next few weeks. These instructions provide you with information on caring for yourself after your procedure. Your health care provider may also give you more specific instructions. Your treatment has been planned according to current medical practices, but problems sometimes occur. Call your health care provider if you have any problems or questions after your procedure.  WHAT TO EXPECT AFTER THE PROCEDURE   · Your urine may have a red tinge for a few days after treatment. Blood loss is usually minimal.  · You may have soreness in the back or flank area. This usually goes away after a few days. The procedure  "can cause blotches or bruises on the back where the pressure wave enters the skin. These marks usually cause only minimal discomfort and should disappear in a short time.  · Stone fragments should begin to pass within 24 hours of treatment. However, a delayed passage is not unusual.  · You may have pain, discomfort, and feel sick to your stomach (nauseated) when the crushed fragments of stone are passed down the tube from the kidney to the bladder. Stone fragments can pass soon after the procedure and may last for up to 4-8 weeks.  · A small number of patients may have severe pain when stone fragments are not able to pass, which leads to an obstruction.  · If your stone is greater than 1 inch (2.5 cm) in diameter or if you have multiple stones that have a combined diameter greater than 1 inch (2.5 cm), you may require more than one treatment.  · If you had a stent placed prior to your procedure, you may experience some discomfort, especially during urination. You may experience the pain or discomfort in your flank or back, or you may experience a sharp pain or discomfort at the base of your penis or in your lower abdomen. The discomfort usually lasts only a few minutes after urinating.  HOME CARE INSTRUCTIONS   · Rest at home until you feel your energy improving.  · Only take over-the-counter or prescription medicines for pain, discomfort, or fever as directed by your health care provider. Depending on the type of lithotripsy, you may need to take antibiotics and anti-inflammatory medicines for a few days.  · Drink enough water and fluids to keep your urine clear or pale yellow. This helps \"flush\" your kidneys. It helps pass any remaining pieces of stone and prevents stones from coming back.  · Most people can resume daily activities within 1-2 days after standard lithotripsy. It can take longer to recover from laser and percutaneous lithotripsy.  · If the stones are in your urinary system, you may be asked to " strain your urine at home to look for stones. Any stones that are found can be sent to a medical lab for examination.  · Visit your health care provider for a follow-up appointment in a few weeks. Your doctor may remove your stent if you have one. Your health care provider will also check to see whether stone particles still remain.  SEEK MEDICAL CARE IF:   · Your pain is not relieved by medicine.  · You have a lasting nauseous feeling.  · You feel there is too much blood in the urine.  · You develop persistent problems with frequent or painful urination that does not at least partially improve after 2 days following the procedure.  · You have a congested cough.  · You feel lightheaded.  · You develop a rash or any other signs that might suggest an allergic problem.  · You develop any reaction or side effects to your medicine(s).  SEEK IMMEDIATE MEDICAL CARE IF:   · You experience severe back or flank pain or both.  · You see nothing but blood when you urinate.  · You cannot pass any urine at all.  · You have a fever or shaking chills.  · You develop shortness of breath, difficulty breathing, or chest pain.  · You develop vomiting that will not stop after 6-8 hours.  · You have a fainting episode.     This information is not intended to replace advice given to you by your health care provider. Make sure you discuss any questions you have with your health care provider.     Document Released: 01/06/2009 Document Revised: 10/08/2014 Document Reviewed: 07/03/2014  [employer] Interactive Patient Education ©2016 [employer]    Ureteroscopy, Care After  This sheet gives you information about how to care for yourself after your procedure. Your health care provider may also give you more specific instructions. If you have problems or questions, contact your health care provider.  What can I expect after the procedure?  After the procedure, it is common to have:  · A burning sensation when you urinate.  · Blood in your  urine.  · Mild discomfort in the bladder area or kidney area when urinating.  · Needing to urinate more often or urgently.  Follow these instructions at home:  Medicines  · Take over-the-counter and prescription medicines only as told by your health care provider.  · If you were prescribed an antibiotic medicine, take it as told by your health care provider. Do not stop taking the antibiotic even if you start to feel better.  General instructions  · Do not drive for 24 hours if you were given a medicine to help you relax (sedative) during your procedure.  · To relieve burning, try taking a warm bath or holding a warm washcloth over your groin.  · Drink enough fluid to keep your urine clear or pale yellow.  ¨ Drink two 8-ounce glasses of water every hour for the first 2 hours after you get home.  ¨ Continue to drink water often at home.  · You can eat what you usually do.  · Keep all follow-up visits as told by your health care provider. This is important.  ¨ If you had a tube placed to keep urine flowing (ureteral stent), ask your health care provider when you need to return to have it removed.  Contact a health care provider if:  · You have chills or a fever.  · You have burning pain for longer than 24 hours after the procedure.  · You have blood in your urine for longer than 24 hours after the procedure.  Get help right away if:  · You have large amounts of blood in your urine.  · You have blood clots in your urine.  · You have very bad pain.  · You have chest pain or trouble breathing.  · You are unable to urinate and you have the feeling of a full bladder.  This information is not intended to replace advice given to you by your health care provider. Make sure you discuss any questions you have with your health care provider.  Document Released: 12/23/2014 Document Revised: 10/03/2017 Document Reviewed: 09/29/2017  [employer] Interactive Patient Education © 2017 [employer]      Depression / Suicide Risk    As  you are discharged from this [facility] facility, it is important to learn how to keep safe from harming yourself.    Recognize the warning signs:  · Abrupt changes in personality, positive or negative- including increase in energy   · Giving away possessions  · Change in eating patterns- significant weight changes-  positive or negative  · Change in sleeping patterns- unable to sleep or sleeping all the time   · Unwillingness or inability to communicate  · Depression  · Unusual sadness, discouragement and loneliness  · Talk of wanting to die  · Neglect of personal appearance   · Rebelliousness- reckless behavior  · Withdrawal from people/activities they love  · Confusion- inability to concentrate     If you or a loved one observes any of these behaviors or has concerns about self-harm, here's what you can do:  · Talk about it- your feelings and reasons for harming yourself  · Remove any means that you might use to hurt yourself (examples: pills, rope, extension cords, firearm)  · Get professional help from the community (Mental Health, Substance Abuse, psychological counseling)  · Do not be alone:Call your Safe Contact- someone whom you trust who will be there for you.  · Call your local CRISIS HOTLINE 993-8490 or 551-822-0154  · Call your local Children's Mobile Crisis Response Team [place] (717) 769-9202 or www.[employer]  · Call the toll free National Suicide Prevention Hotlines   · National Suicide Prevention Lifeline 998-622-EBZL (1662)  · National Hope Line Network 800-SUICIDE (453-9652)

## 2018-12-10 NOTE — PROGRESS NOTES
Paged Dr. Mckeon for updated orders for post-op patient. Referred to Dr. Rodriguez. Awaiting call.

## 2018-12-10 NOTE — PROGRESS NOTES
"Report received from AM shift RN; assumed care. Family bedside. A&O X 4. VSS. Room air. Medicated for pain; see MAR, for dull ache/abdominal pain. BS hypoactive x 4. Mild abdominal distension noted, non-tender with palpation. + void, blood tinged/hesitancy/small amounts reported by patient. + BM, patient states normal for BM once weekly. Patient tolerating regular diet. Patient reported procedure with Dr. Bajwa went \"well\". Patient tolerating MIVF. No nausea, vomiting, numbness, tingling, SOB reported/noted. Patient up self with steady gait. POC discussed.   Call light/personal belongings within reach. Bed locked/lowest position. All needs met.   "

## 2018-12-10 NOTE — PROGRESS NOTES
Paged urology, they are OK with patient being discharged. Called [facility] and spoke to Dr. Rodriguez, advised him what urology had said. MD stated that he would place dc order.

## 2018-12-10 NOTE — NON-PROVIDER
[facility]     Attending: Reynaldo Boykin M.D.  Senior Resident: Samir Rodriguez M.D. (PGY-3)  Tino Resident: Jeny Gaspar M.D. (PGY-1)  Medical Student: Kaushal Barnes (MSIII)  PATIENT: Marlys Jane; [id number]; 1995    ID: 23 y.o. Female with a PMHx of nephrolithiasis admitted for work up of a 10 day history of right lower flank pain due to a 9 mm proximal ureter stone and mild hydronephrosis.    24 Hour Events:  On 12/9/2018, the patient underwent a right flexible uteroscopy with laser lithotripsy for a 9 mm right proximal ureter stone.      SUBJECTIVE: The patient presented to the ED on the night of 12/8/18 right lower flank and RLQ pain. The patient reported a 10 day history of a cramping, menstrual-like pain, which became severe, sharp and stabbing, prompting her presentation to the hospital. CT revealed a 9 mm proximal ureter stone and a right flexible uteroscopy with laser lithotripsy was performed on 12/9/2018. Today, she is recovering well and has voided small amounts of blood tinged urine. She denies nausea, vomiting, subjective fevers, chills dysuria, frequency, urgency or hematuria. The patient's LMP was approximately one month ago and she has an IUD for contraception. She denies any congestion, chest pain, shortness of breath, and diarrhea.     Past Medical History:   Diagnosis Date   • Anxiety 2017   • Gall stones    • Kidney disease     kidney stones     OBJECTIVE:  Vitals:    12/09/18 1914 12/09/18 2321 12/10/18 0313 12/10/18 0340   BP: 131/84 120/61 (!) 92/49 108/60   Pulse: 85 75 85 68   Resp: 18 17 18    Temp: 36.3 °C (97.4 °F) 36.6 °C (97.9 °F) 36.1 °C (96.9 °F)    TempSrc: Temporal Temporal Temporal    SpO2: 97% 97% 96%    Weight:       Height:           Intake/Output Summary (Last 24 hours) at 12/10/18 0656  Last data filed at 12/10/18 0600   Gross per 24 hour   Intake             1200 ml   Output             1851 ml   Net             -651 ml     PE:    General: No acute  distress, resting comfortably in bed. A&O x 4.  HEENT: NC/AT. PERRLA. EOMI. MMM  Cardiovascular: RRR with no M/R/G.  Respiratory: Symmetrical chest. CTAB with no adventitious breath sounds   Abdomen: soft, mild distension, non-tender, no masses, +BS x4  EXT:  DUKES, 5/5 strength, No C/C/E 2+ pulses   Neuro: non focal with no numbness, tingling or changes in sensation    LABS:  Recent Labs      12/09/18   0012   WBC  11.1*   RBC  4.68   HEMOGLOBIN  13.9   HEMATOCRIT  42.3   MCV  90.4   MCH  29.7   RDW  41.7   PLATELETCT  256   MPV  9.3   NEUTSPOLYS  52.70   LYMPHOCYTES  37.40   MONOCYTES  7.90   EOSINOPHILS  1.40   BASOPHILS  0.30     Recent Labs      12/09/18   0012   SODIUM  137   POTASSIUM  3.6   CHLORIDE  105   CO2  23   BUN  13   CREATININE  0.83   CALCIUM  10.3   ALBUMIN  4.6     Estimated GFR/CRCL = Estimated Creatinine Clearance: 97.3 mL/min (by C-G formula based on SCr of 0.83 mg/dL).  Recent Labs      12/09/18   0012   GLUCOSE  89     Recent Labs      12/09/18   0012   ASTSGOT  23   ALTSGPT  24   TBILIRUBIN  0.3   ALKPHOSPHAT  71   GLOBULIN  3.7*         IMAGING:    CT-ABDOMEN-PELVIS WITH  Narrative: 12/9/2018 12:37 AM    HISTORY/REASON FOR EXAM:  RLQ pain, eval appy; RLQ Pain    TECHNIQUE/EXAM DESCRIPTION:  CT scan of the abdomen and pelvis with contrast.    Contrast-enhanced helical scanning was obtained from the diaphragmatic domes through the pubic symphysis following the bolus administration of 100 mL of nonionic contrast without complication.    Low dose optimization technique was utilized for this CT exam including automated exposure control and adjustment of the mA and/or kV according to patient size.    COMPARISON: 4/20/2017    FINDINGS:  The visualized lung bases are clear.    The enhanced liver demonstrates an ill-defined subcentimeter hypodensity in the right lobe which could represent a small cyst or hemangioma but is indeterminate. The enhanced gallbladder, pancreas, spleen, adrenals and left  kidney are unremarkable.    There is mild right hydronephrosis secondary to an 8 mm proximal right ureteral stone. There is mild associated periureteral stranding and urothelial enhancement.    There is no free air or fluid. The large and small bowel are unremarkable. The appendix is not visualized.    The urinary bladder is unremarkable. An intrauterine device is present within the uterus which is otherwise unremarkable. There is trace free pelvic fluid which is likely physiologic.    There are no suspicious osseous lesions.  Impression: 1.  Mild right hydronephrosis secondary to an 8 mm proximal right ureteral stone.  2.  The appendix is not visualized, early acute appendicitis cannot be excluded.  3.  Intrauterine device.  4.  Trace free pelvic fluid, likely physiologic.    MEDS:  Current Facility-Administered Medications   Medication Last Dose   • senna-docusate (PERICOLACE or SENOKOT S) 8.6-50 MG per tablet 2 Tab 2 Tab at 12/10/18 0551    And   • polyethylene glycol/lytes (MIRALAX) PACKET 1 Packet      And   • magnesium hydroxide (MILK OF MAGNESIA) suspension 30 mL      And   • bisacodyl (DULCOLAX) suppository 10 mg     • 0.9 % NaCl with KCl 20 mEq infusion     • enoxaparin (LOVENOX) inj 40 mg 40 mg at 12/10/18 0550   • acetaminophen (TYLENOL) tablet 650 mg 650 mg at 12/09/18 0501   • cefTRIAXone (ROCEPHIN) 1 g in  mL IVPB 1 g at 12/10/18 0550   • ketorolac (TORADOL) injection 30 mg     • morphine (pf) 10 mg/mL injection 3 mg 3 mg at 12/09/18 1541   • oxyCODONE-acetaminophen (PERCOCET) 5-325 MG per tablet 1 Tab 1 Tab at 12/10/18 0554   • ondansetron (ZOFRAN) syringe/vial injection 4 mg 4 mg at 12/09/18 1731     PROBLEM LIST:  Problem List Items Addressed This Visit     None      Visit Diagnoses     Nephrolithiasis        Relevant Medications    morphine (pf) 10 mg/mL injection 6 mg (Completed)    morphine (pf) 10 mg/mL injection 3 mg    oxyCODONE-acetaminophen (PERCOCET) 5-325 MG per tablet 1 Tab     Hydronephrosis with urinary obstruction due to ureteral calculus            ASSESSMENT/PLAN:   Marlys Jane is a 23 y.o. Female with a PMHx of nephrolithiasis admitted for work up of a 9 mm proximal ureter stone and mild hydronephrosis as well as an elevated WBC count concerning for complicated UTI.    # Right Ureteral Stone  # H/o Medullary Sponge kidneys  - Patient is POD#1 from a right flexible uteroscopy with laser lithotripsy and recovering well  - Discontinue Rocephin regimen and begin Augmentin regimen, 875 mg orally twice daily for 10 days, for complicated cystitis. Continue regimen as outpatient following discharge  - Call Urology today with recommendations on ABX change and follow up  - Discharge today if patient continues to progress without complication     Core Measures  Code: FULL  Lines: PIV  Abx: Augmentin, 875 mg orally twice daily for 10 days  DVT ppx: lovenox,  GI ppx: bowel protocol  Diet: Regular  PCP: Dr. Cheema     Dispo: Pending clearance form Urology for discharge    OSCAR Bullock

## 2018-12-10 NOTE — PROGRESS NOTES
Update received from NOC shift RN.   A/O X 4. Room air.   Pt does not report any pain at this time.   PIV SL.   Pt has been voiding with small amount of blood.  No other needs.   Call light at bedside.
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
## 2018-12-10 NOTE — CARE PLAN
"Problem: Infection  Goal: Will remain free from infection  Outcome: PROGRESSING AS EXPECTED  Monitoring labs. Patient tolerating MIVF/IV antibiotics. No reported/noted adverse antibiotic side effects. Fluids encouraged.     Problem: Bowel/Gastric:  Goal: Normal bowel function is maintained or improved  Ambulation/fluids encouraged. Patient stated she will \"lay off the soda.\" Hypoactive BS x 4. + flatus reported. Patient verbalized understanding.       "

## 2019-01-23 ENCOUNTER — NON-PROVIDER VISIT (OUTPATIENT)
Dept: OCCUPATIONAL MEDICINE | Facility: CLINIC | Age: 24
End: 2019-01-23

## 2019-01-23 DIAGNOSIS — Z02.89 ENCOUNTER FOR OCCUPATIONAL HEALTH EXAMINATION: ICD-10-CM

## 2019-01-23 DIAGNOSIS — Z02.1 PRE-EMPLOYMENT DRUG SCREENING: ICD-10-CM

## 2019-01-23 LAB
AMP AMPHETAMINE: NORMAL
COC COCAINE: NORMAL
INT CON NEG: NORMAL
INT CON POS: NORMAL
MET METHAMPHETAMINES: NORMAL
OPI OPIATES: NORMAL
PCP PHENCYCLIDINE: NORMAL
POC DRUG COMMENT 753798-OCCUPATIONAL HEALTH: NORMAL
THC: NORMAL

## 2019-01-23 PROCEDURE — 80305 DRUG TEST PRSMV DIR OPT OBS: CPT | Performed by: PREVENTIVE MEDICINE

## 2019-01-25 PROCEDURE — 86580 TB INTRADERMAL TEST: CPT | Performed by: PREVENTIVE MEDICINE

## 2019-01-26 ENCOUNTER — NON-PROVIDER VISIT (OUTPATIENT)
Dept: URGENT CARE | Facility: CLINIC | Age: 24
End: 2019-01-26

## 2019-01-26 LAB — TB WHEAL 3D P 5 TU DIAM: NORMAL MM

## 2019-04-06 ENCOUNTER — APPOINTMENT (OUTPATIENT)
Dept: RADIOLOGY | Facility: MEDICAL CENTER | Age: 24
End: 2019-04-06
Attending: EMERGENCY MEDICINE
Payer: MEDICAID

## 2019-04-06 ENCOUNTER — HOSPITAL ENCOUNTER (EMERGENCY)
Facility: MEDICAL CENTER | Age: 24
End: 2019-04-06
Attending: EMERGENCY MEDICINE
Payer: MEDICAID

## 2019-04-06 VITALS
HEIGHT: 60 IN | RESPIRATION RATE: 16 BRPM | BODY MASS INDEX: 33.38 KG/M2 | DIASTOLIC BLOOD PRESSURE: 67 MMHG | HEART RATE: 100 BPM | OXYGEN SATURATION: 96 % | SYSTOLIC BLOOD PRESSURE: 117 MMHG | TEMPERATURE: 97.8 F | WEIGHT: 170 LBS

## 2019-04-06 DIAGNOSIS — T07.XXXA MULTIPLE CONTUSIONS: ICD-10-CM

## 2019-04-06 DIAGNOSIS — S06.0X1A CONCUSSION WITH LOSS OF CONSCIOUSNESS OF 30 MINUTES OR LESS, INITIAL ENCOUNTER: ICD-10-CM
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
## 2019-04-06 PROCEDURE — 700111 HCHG RX REV CODE 636 W/ 250 OVERRIDE (IP): Performed by: EMERGENCY MEDICINE

## 2019-04-06 PROCEDURE — 700102 HCHG RX REV CODE 250 W/ 637 OVERRIDE(OP): Performed by: EMERGENCY MEDICINE

## 2019-04-06 PROCEDURE — 99284 EMERGENCY DEPT VISIT MOD MDM: CPT

## 2019-04-06 PROCEDURE — A9270 NON-COVERED ITEM OR SERVICE: HCPCS | Performed by: EMERGENCY MEDICINE

## 2019-04-06 PROCEDURE — 70450 CT HEAD/BRAIN W/O DYE: CPT

## 2019-04-06 RX ORDER — IBUPROFEN 600 MG/1
600 TABLET ORAL EVERY 6 HOURS PRN
Status: DISCONTINUED | OUTPATIENT
Start: 2019-04-06 | End: 2019-04-06 | Stop reason: HOSPADM

## 2019-04-06 RX ORDER — ONDANSETRON 4 MG/1
4 TABLET, ORALLY DISINTEGRATING ORAL ONCE
Status: COMPLETED | OUTPATIENT
Start: 2019-04-06 | End: 2019-04-06

## 2019-04-06 RX ORDER — ACETAMINOPHEN 325 MG/1
650 TABLET ORAL ONCE
Status: DISCONTINUED | OUTPATIENT
Start: 2019-04-06 | End: 2019-04-06 | Stop reason: HOSPADM

## 2019-04-06 RX ADMIN — ONDANSETRON 4 MG: 4 TABLET, ORALLY DISINTEGRATING ORAL at 10:44
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
## 2019-04-06 NOTE — DISCHARGE INSTRUCTIONS
Use Tylenol and Motrin if needed for discomfort, return here if you feel you are experiencing new or worsening symptoms or you need emergency medical care.
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
## 2019-04-06 NOTE — ED NOTES
Pt left before receiving her discharge paperwork.  Pt said she will be staying with her grandparents.

## 2019-12-09 ENCOUNTER — HOSPITAL ENCOUNTER (EMERGENCY)
Facility: MEDICAL CENTER | Age: 24
End: 2019-12-09
Attending: EMERGENCY MEDICINE
Payer: MEDICAID

## 2019-12-09 VITALS
WEIGHT: 167.99 LBS | HEIGHT: 60 IN | SYSTOLIC BLOOD PRESSURE: 117 MMHG | OXYGEN SATURATION: 98 % | HEART RATE: 101 BPM | DIASTOLIC BLOOD PRESSURE: 73 MMHG | RESPIRATION RATE: 16 BRPM | TEMPERATURE: 99.9 F | BODY MASS INDEX: 32.98 KG/M2

## 2019-12-09 DIAGNOSIS — J10.1 INFLUENZA B: ICD-10-CM

## 2019-12-09 LAB
APPEARANCE UR: CLEAR
BILIRUB UR QL STRIP.AUTO: NEGATIVE
COLOR UR: YELLOW
FLUAV RNA SPEC QL NAA+PROBE: NEGATIVE
FLUBV RNA SPEC QL NAA+PROBE: POSITIVE
GLUCOSE UR STRIP.AUTO-MCNC: NEGATIVE MG/DL
KETONES UR STRIP.AUTO-MCNC: NEGATIVE MG/DL
LEUKOCYTE ESTERASE UR QL STRIP.AUTO: NEGATIVE
MICRO URNS: ABNORMAL
NITRITE UR QL STRIP.AUTO: NEGATIVE
PH UR STRIP.AUTO: 8.5 [PH] (ref 5–8)
PROT UR QL STRIP: NEGATIVE MG/DL
RBC UR QL AUTO: NEGATIVE
S PYO DNA SPEC NAA+PROBE: NOT DETECTED
SP GR UR STRIP.AUTO: 1.03
UROBILINOGEN UR STRIP.AUTO-MCNC: 1 MG/DL

## 2019-12-09 PROCEDURE — 99284 EMERGENCY DEPT VISIT MOD MDM: CPT

## 2019-12-09 PROCEDURE — A9270 NON-COVERED ITEM OR SERVICE: HCPCS | Performed by: EMERGENCY MEDICINE

## 2019-12-09 PROCEDURE — 87651 STREP A DNA AMP PROBE: CPT

## 2019-12-09 PROCEDURE — 81003 URINALYSIS AUTO W/O SCOPE: CPT

## 2019-12-09 PROCEDURE — 700102 HCHG RX REV CODE 250 W/ 637 OVERRIDE(OP): Performed by: EMERGENCY MEDICINE

## 2019-12-09 PROCEDURE — 87502 INFLUENZA DNA AMP PROBE: CPT

## 2019-12-09 RX ORDER — NAPROXEN 500 MG/1
500 TABLET ORAL 2 TIMES DAILY WITH MEALS
Qty: 20 TAB | Refills: 0 | Status: SHIPPED | OUTPATIENT
Start: 2019-12-09 | End: 2021-07-22

## 2019-12-09 RX ORDER — IBUPROFEN 600 MG/1
600 TABLET ORAL ONCE
Status: COMPLETED | OUTPATIENT
Start: 2019-12-09 | End: 2019-12-09

## 2019-12-09 RX ADMIN — IBUPROFEN 600 MG: 600 TABLET ORAL at 18:43

## 2019-12-10 NOTE — ED PROVIDER NOTES
"ED Provider Note    Scribed for Gorge Mckeon M.D. by Georgette Love. 12/9/2019  6:16 PM    CHIEF COMPLAINT  Chief Complaint   Patient presents with   • Flank Pain     right   • Headache   • Cough       HPI  Marlys Jane is a 24 y.o. female, with a history of kidney stones, who presents to the Emergency Room for evaluation of acute, constant, radiating bilateral flank pain onset one week ago, with no alleviation her symptoms since onset, prompting her to visit the ED today for further evaluation of her condition. The patient reports that she has had recent sick contact with her kids who were diagnosed with Influenza and strep throat. For the past two days she has been experiencing symptoms of a headache, generalized body aches, sore throat and a constant nonproductive dry cough. She additionally notes that over the past week, she has been experiencing acute, constant, moderate bilateral flank pain, left greater than right that occasionally radiates to her abdomen. She describes her pain as sharp in nature, she notes \"it sometimes feel like jolts to my flanks.\" No exacerbating or alleviating factors were reported. The patient does not report taking any over the counter medications for pain control. She has no known allergies to medications.     REVIEW OF SYSTEMS  See HPI for further details.    PAST MEDICAL HISTORY   has a past medical history of Anxiety (2017), Gall stones, and Kidney disease.    SOCIAL HISTORY  Social History     Tobacco Use   • Smoking status: Never Smoker   • Smokeless tobacco: Never Used   Substance and Sexual Activity   • Alcohol use: No   • Drug use: No   • Sexual activity: Yes     Partners: Male     Birth control/protection: Surgical, I.U.D.     Comment: Paragard on 8/29/2019       SURGICAL HISTORY   has a past surgical history that includes cystoscopy stent placement (N/A, 5/17/2016); ureteroscopy (Right, 5/17/2016); lasertripsy (Right, 5/17/2016); ureteroscopy (Right, 12/9/2018); and " cystoscopy stent placement (12/9/2018).    CURRENT MEDICATIONS      Current Outpatient Medications:   •  PARAGARD INTRAUTERINE COPPER IUD, by Intrauterine route Continuous., Disp: , Rfl:     ALLERGIES  No Known Allergies    PHYSICAL EXAM  VITAL SIGNS: /67   Pulse 99   Temp 37.6 °C (99.6 °F) (Oral)   Resp 20   Ht 1.524 m (5')   Wt 76.2 kg (167 lb 15.9 oz)   SpO2 96%   BMI 32.81 kg/m²   Pulse ox interpretation: I interpret this pulse ox as normal.  Constitutional: Alert in no apparent distress.  HENT: Normocephalic, Atraumatic, Bilateral external ears normal. Nose normal. Mild erythema to the tonsillar pillars  Eyes: Conjunctiva normal, non-icteric.   Heart: Regular rate and rhythm, no murmurs.    Lungs: Occasional dry cough. Clear to auscultation bilaterally.   Back: Bilateral mild CVA tenderness  Skin: Warm, Dry, No erythema, No rash.   Neurologic: Alert, Grossly non-focal.   Psychiatric: Affect normal, Judgment normal, Mood normal, Appears appropriate and not intoxicated.       LABS    Results for orders placed or performed during the hospital encounter of 12/09/19   URINALYSIS,CULTURE IF INDICATED   Result Value Ref Range    Color Yellow     Character Clear     Specific Gravity 1.030 <1.035    Ph 8.5 (A) 5.0 - 8.0    Glucose Negative Negative mg/dL    Ketones Negative Negative mg/dL    Protein Negative Negative mg/dL    Bilirubin Negative Negative    Urobilinogen, Urine 1.0 Negative    Nitrite Negative Negative    Leukocyte Esterase Negative Negative    Occult Blood Negative Negative    Micro Urine Req see below    Group A Strep by PCR   Result Value Ref Range    Group A Strep by PCR Not Detected Not Detected   Influenza A/B By PCR (Adult - Flu Only)   Result Value Ref Range    Influenza virus A RNA Negative Negative    Influenza virus B, PCR POSITIVE (A) Negative         COURSE & MEDICAL DECISION MAKING  The patient's VS, Nurses notes reviewed. (See chart for details)    6:16 PM Patient seen and  examined at bedside. Patient presents to the ED complaining of acute, constant bilateral flank pain. She is also experiencing symptoms of a headache, generalized body aches, sore throat and a constant nonproductive dry cough. The differential diagnosis includes but are not limited to Flu, strep, common cold virus, less likely kidney stones, given the bilateral flank pain which is fairly dull, though urinary tract infection/pyelonephritis is a possibility. Discussed plan of care with patient which includes treating her for symptoms and ordering lab work for further evaluation of her condition. A urinalysis will be performed to rule out a urinary or kidney infection. A strep test will be performed to rule out strep throat and a Influenza test will be performed to rule out Influenza. Patient verbalizes her understanding and agreement to the plan of care. Ordered for influenza A/B by PCR, Group A Strep by PCR, UA culture to evaluate. Patient will be treated with Motrin 600 mg for her symptoms.     8:00 PM Recheck. I informed the patient that her urinalysis results was not consistent with a kidney or urine infection, however she did test positive for Influenza B.  I suspect a musculoskeletal cause of her flank pain, likely typical muscle and body aches from influenza.  Long discussion was had with patient regarding viral process. Viral illnesses can last seven to fourteen days and occasionally longer. Patient understands we can not treat viruses and her illness may worsen. Treatment for viral infections include copious amounts of fluid, rest, fever control and frequent washing of the hands to avoid the spread of the virus. Slight elevation of the head while the patient is laying on a bed can help drain mucus for relief from nasal congestion. Using a mist humidifier can also be helpful. The patient was also advised to take Tylenol and Ibuprofen for pain control. She will also be discharged with a prescription for Naprosyn  500 mg which is to be taken twice daily for her flank pain. She was given strict return precautions for symptoms including difficulty breathing, poor fluid intake, worsening fever, decreased activity, nausea, vomiting, diarrhea or any other concerning findings. Patient is comfortable with discharge.    The patient will return for new or worsening symptoms and is stable at the time of discharge.    DISPOSITION:  Patient will be discharged home in stable condition.    FOLLOW UP:  Akshat Cheema M.D.  [id number]  [place]  [facility] [address]  [phone]      As needed      OUTPATIENT MEDICATIONS:  Discharge Medication List as of 12/9/2019  7:53 PM      START taking these medications    Details   naproxen (NAPROSYN) 500 MG Tab Take 1 Tab by mouth 2 times a day, with meals., Disp-20 Tab, R-0, Normal             FINAL IMPRESSION  1. Influenza B         Georgette MONTALVO (Scribe), am scribing for, and in the presence of, Gorge Mckeon M.D..    Electronically signed by: Georgette Love (Sophiaibe), 12/9/2019    IGorge M.D. personally performed the services described in this documentation, as scribed by Georgette Love in my presence, and it is both accurate and complete.    E    The note accurately reflects work and decisions made by me.  Gorge Mckeon  12/9/2019  10:27 PM

## 2019-12-10 NOTE — ED TRIAGE NOTES
"Chief Complaint   Patient presents with   • Flank Pain     right   • Headache   • Cough     History of kidney stones.  States it \"feels like one is starting.\"  Cough, headache and sore throat.  States her children just diagnosed with flu and strep throat.  Triage process explained to patient.  Pt back to waiting room.  Pt instructed to inform RN if any changes or questions arise.    "

## 2020-01-01 ENCOUNTER — NON-PROVIDER VISIT (OUTPATIENT)
Dept: URGENT CARE | Facility: PHYSICIAN GROUP | Age: 25
End: 2020-01-01
Payer: MEDICAID

## 2020-01-01 DIAGNOSIS — Z11.1 PPD SCREENING TEST: ICD-10-CM

## 2020-01-01 PROCEDURE — 86580 TB INTRADERMAL TEST: CPT | Performed by: NURSE PRACTITIONER

## 2020-01-02 NOTE — NON-PROVIDER
Marlys Jane is a 24 y.o. female here for a non-provider visit for PPD placement -- Step 1 of 1    Reason for PPD:  work requirement    1. TB evaluation questionnaire completed by patient? Yes      -  If any answers marked yes did you contact a provider prior to placing? Not Indicated  2.  Patient notified to return to clinic for reading on: 01/03/20 after 13:29 or 1/04/20 before 13:29  3.  PPD Placement documentation completed on TB evaluation questionnaire? Yes  4.  Location of TB evaluation questionnaire filed:  file

## 2020-01-04 ENCOUNTER — NON-PROVIDER VISIT (OUTPATIENT)
Dept: URGENT CARE | Facility: PHYSICIAN GROUP | Age: 25
End: 2020-01-04

## 2020-01-04 LAB — TB WHEAL 3D P 5 TU DIAM: NORMAL MM

## 2020-07-25 ENCOUNTER — APPOINTMENT (OUTPATIENT)
Dept: RADIOLOGY | Facility: MEDICAL CENTER | Age: 25
End: 2020-07-25
Attending: EMERGENCY MEDICINE
Payer: MEDICAID

## 2020-07-25 ENCOUNTER — HOSPITAL ENCOUNTER (EMERGENCY)
Facility: MEDICAL CENTER | Age: 25
End: 2020-07-25
Attending: EMERGENCY MEDICINE
Payer: MEDICAID

## 2020-07-25 VITALS
DIASTOLIC BLOOD PRESSURE: 60 MMHG | WEIGHT: 174.6 LBS | SYSTOLIC BLOOD PRESSURE: 122 MMHG | OXYGEN SATURATION: 97 % | HEART RATE: 98 BPM | BODY MASS INDEX: 34.28 KG/M2 | HEIGHT: 60 IN | TEMPERATURE: 97 F | RESPIRATION RATE: 16 BRPM

## 2020-07-25 DIAGNOSIS — R10.9 ABDOMINAL PAIN DURING PREGNANCY IN FIRST TRIMESTER: ICD-10-CM

## 2020-07-25 DIAGNOSIS — O26.891 ABDOMINAL PAIN DURING PREGNANCY IN FIRST TRIMESTER: ICD-10-CM

## 2020-07-25 LAB — B-HCG SERPL-ACNC: 5003 MIU/ML (ref 0–5)

## 2020-07-25 PROCEDURE — 76801 OB US < 14 WKS SINGLE FETUS: CPT

## 2020-07-25 PROCEDURE — 99284 EMERGENCY DEPT VISIT MOD MDM: CPT

## 2020-07-25 PROCEDURE — 84702 CHORIONIC GONADOTROPIN TEST: CPT

## 2020-07-25 ASSESSMENT — PAIN DESCRIPTION - DESCRIPTORS: DESCRIPTORS: CRAMPING

## 2020-07-25 ASSESSMENT — FIBROSIS 4 INDEX: FIB4 SCORE: 0.48

## 2020-07-26 NOTE — ED TRIAGE NOTES
Chief Complaint   Patient presents with   • Abdominal Cramping     Abd feels crampy, last period 6/6/20.  Has had 3 positive pregnancy tests at home.  Wants to know how far along she is and if everything is all right     Just had IUD removed because it was in crooked and causing pain.

## 2020-07-26 NOTE — ED PROVIDER NOTES
ED Provider Note    Scribed for Gorge Mckeon M.D. by Georgette Love. 2020,  8:13 PM.    CHIEF COMPLAINT  Chief Complaint   Patient presents with   • Abdominal Cramping     Abd feels crampy, last period 20.  Has had 3 positive pregnancy tests at home.  Wants to know how far along she is and if everything is all right       HPI  Marlys Jane is a 25 y.o. female, who  and is currently pregnant, presents to the Emergency Department for acute, intermittent episodes of non-radiating abdominal pain that began one week ago and has not resolved since onset. The patient reports that over the past week she has been experiencing some abdominal comfort and describes her pain as cramping in nature. Exacerbating factors include standing up or palpation to the respective area. No alleviating factors were reported. She does not report taking any over the counter medications for pain control. The patient notes that she has had three positive pregnancy tests at home but does not know how far along her pregnancy she is. Per nursing note, she additionally notes that she recently had her intrauterine device removed because it was not positioned correctly and was causing pan. The first day of her last menstrual period was on 2020. She notes that her last menstrual period was normal in flow and length. She denies recent symptoms of vaginal bleeding, nausea, vomiting or diarrhea.Reported past medical history includes gallstones. She denies any previous ectopic pregnancies. The patient does not take any daily medications. She has no known allergies to medications.     REVIEW OF SYSTEMS  See HPI for further details.    PAST MEDICAL HISTORY   has a past medical history of Anxiety (2017), Gall stones, and Kidney disease.    SOCIAL HISTORY  Social History     Tobacco Use   • Smoking status: Never Smoker   • Smokeless tobacco: Never Used   Substance and Sexual Activity   • Alcohol use: No   • Drug use: No   • Sexual  activity: Yes     Partners: Male     Birth control/protection: Surgical, I.U.D.     Comment: Paragard on 8/29/2019     Social History     Substance and Sexual Activity   Drug Use No       SURGICAL HISTORY   has a past surgical history that includes cystoscopy stent placement (N/A, 5/17/2016); ureteroscopy (Right, 5/17/2016); lasertripsy (Right, 5/17/2016); ureteroscopy (Right, 12/9/2018); and cystoscopy stent placement (12/9/2018).    CURRENT MEDICATIONS  Home Medications     Reviewed by Angela Penaloza R.N. (Registered Nurse) on 07/25/20 at 1906  Med List Status: Complete   Medication Last Dose Status   naproxen (NAPROSYN) 500 MG Tab  Active                ALLERGIES  No Known Allergies    PHYSICAL EXAM  VITAL SIGNS: /60   Pulse 99   Temp 36.1 °C (97 °F) (Temporal)   Resp 16   Ht 1.524 m (5')   Wt 79.2 kg (174 lb 9.7 oz)   LMP 06/06/2020 (Exact Date)   SpO2 96%   BMI 34.10 kg/m²   Pulse ox interpretation: I interpret this pulse ox as normal.  Constitutional: Alert in no apparent distress.  HENT: No signs of trauma, Bilateral external ears normal, Nose normal.   Cardiovascular: Regular rate and rhythm, no murmurs.   Thorax & Lungs: Normal breath sounds, No respiratory distress, No wheezing, No chest tenderness.   Abdomen: Bowel sounds normal, Soft, No tenderness, No masses, No pulsatile masses. No peritoneal signs.  Skin: Warm, Dry, No erythema, No rash.   Neurologic: Alert , Normal motor function, Normal sensory function, No focal deficits noted.   Psychiatric: Affect normal, Judgment normal, Mood normal.       DIAGNOSTIC STUDIES / PROCEDURES    LABS  Labs Reviewed   HCG QUANTITATIVE - Abnormal; Notable for the following components:       Result Value    Bhcg 5003.0 (*)     All other components within normal limits     All labs reviewed by me.    RADIOLOGY  US-OB 1ST TRIMESTER WITH TRANSVAGINAL (COMBO)   Final Result      Intrauterine gestational sac containing a yolk sac. This projects to a 5 week  2 day gestation with estimated due date 3/25/2021. Recommend continued follow-up with beta hCG and ultrasound.      Possible small subchorionic hemorrhage.        The radiologist's interpretation of all radiological studies have been reviewed by me.    COURSE & MEDICAL DECISION MAKING  Nursing notes, BERNICE CASEYx reviewed in chart.     8:13 PM Patient seen and examined at bedside.Patient presents to the ED complaining of intermittent episodes of abdominal cramping after having three positive at-home pregnancy tests. Her physical exam is not remarkable for any abdominal tenderness. Discussed plan of care with patient which includes performing diagnostic imaging and obtaining lab work. She does not require any medications in the ED at this time. Patient verbalizes her understanding and agreement to the plan of care. Ordered for Beta-hCG quantitative serum and US-OB 1st trimester with transvaginal to evaluate.     9:43 PM Recheck. The patient was updated on her lab work and radiology results which are consistent with an intrauterine pregnancy of five weeks and two days with an estimated due date of 3/25/2021. She was instructed to have a follow-up with beta hCG and ultrasound. At this time the patient has stable vital signs and can be discharged. They were given discharge instructions which include starting prenatal vitamins, using Tylenol for pain control and following up with their primary care provider. The patient was given a referral to [facility] and instructed to immediately return to the ED if their symptoms worsen. Patient verbalizes their understanding and agreement to plan of discharge.     The patient will return for new or worsening symptoms and is stable at the time of discharge.    DISPOSITION:  Patient will be discharged home in stable condition.    FOLLOW UP:  [facility] [address]  [facility] [address] [phone]  Call in 2  days      FINAL IMPRESSION  1. Abdominal pain during pregnancy in first trimester         Georgette MONTALVO (Scribe), am scribing for, and in the presence of, Gorge Mckeon M.D..    Electronically signed by: Georgette Love (Scribe), 7/25/2020    Gorge MONTALVO M.D. personally performed the services described in this documentation, as scribed by Georgette Love in my presence, and it is both accurate and complete.    E    The note accurately reflects work and decisions made by me.  Gorge Mckeon M.D.  7/25/2020  10:12 PM

## 2020-07-26 NOTE — DISCHARGE INSTRUCTIONS
Your blood work and ultrasound were reassuring.  Your ultrasound shows an intrauterine pregnancy, measuring 5 weeks, 2 days.  There are no signs of any complications.  It is important to schedule follow-up with the pregnancy center, and to start on a prenatal vitamin.  Use heat packs, Tylenol, and gentle stretching and activity to help with pain.

## 2021-01-13 ENCOUNTER — HOSPITAL ENCOUNTER (EMERGENCY)
Facility: MEDICAL CENTER | Age: 26
End: 2021-01-13
Attending: EMERGENCY MEDICINE
Payer: MEDICAID

## 2021-01-13 ENCOUNTER — APPOINTMENT (OUTPATIENT)
Dept: RADIOLOGY | Facility: MEDICAL CENTER | Age: 26
End: 2021-01-13
Attending: EMERGENCY MEDICINE
Payer: MEDICAID

## 2021-01-13 VITALS
OXYGEN SATURATION: 98 % | HEART RATE: 99 BPM | HEIGHT: 60 IN | WEIGHT: 174.6 LBS | DIASTOLIC BLOOD PRESSURE: 82 MMHG | SYSTOLIC BLOOD PRESSURE: 125 MMHG | RESPIRATION RATE: 16 BRPM | BODY MASS INDEX: 34.28 KG/M2 | TEMPERATURE: 98.4 F

## 2021-01-13 DIAGNOSIS — R68.84 JAW PAIN: ICD-10-CM

## 2021-01-13 DIAGNOSIS — S03.40XA SPRAIN OF TEMPOROMANDIBULAR JOINT, INITIAL ENCOUNTER: ICD-10-CM

## 2021-01-13 PROCEDURE — 70355 PANORAMIC X-RAY OF JAWS: CPT

## 2021-01-13 PROCEDURE — 99283 EMERGENCY DEPT VISIT LOW MDM: CPT

## 2021-01-13 RX ORDER — HYDROCODONE BITARTRATE AND ACETAMINOPHEN 5; 325 MG/1; MG/1
1-2 TABLET ORAL EVERY 6 HOURS PRN
Qty: 12 TAB | Refills: 0 | Status: SHIPPED | OUTPATIENT
Start: 2021-01-13 | End: 2021-01-18

## 2021-01-13 RX ORDER — CYCLOBENZAPRINE HCL 10 MG
10 TABLET ORAL 3 TIMES DAILY PRN
Qty: 15 TAB | Refills: 0 | Status: SHIPPED | OUTPATIENT
Start: 2021-01-13 | End: 2021-07-22

## 2021-01-13 ASSESSMENT — LIFESTYLE VARIABLES
DO YOU DRINK ALCOHOL: YES
HAVE YOU EVER FELT YOU SHOULD CUT DOWN ON YOUR DRINKING: NO
HAVE PEOPLE ANNOYED YOU BY CRITICIZING YOUR DRINKING: NO
TOTAL SCORE: 0
CONSUMPTION TOTAL: INCOMPLETE
TOTAL SCORE: 0
EVER FELT BAD OR GUILTY ABOUT YOUR DRINKING: NO
EVER HAD A DRINK FIRST THING IN THE MORNING TO STEADY YOUR NERVES TO GET RID OF A HANGOVER: NO
TOTAL SCORE: 0
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
## 2021-01-14 NOTE — ED NOTES
Pt provided with discharge paper work and education on prescriptions. Pt declines any questions at this time. Pt ambulated out of ER without complication.
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
## 2021-03-15 NOTE — PROGRESS NOTES
1100- G-6 P-3. EDC- 35  3/7 weeks. Presented to labor and delivery for second dose of Celestone.  Denies contractions, vaginal bleeding and leaking of fluid. Confirms fetal movements. EFM and TOCO applied. Eating lunch at this time. Point of care discussed with pt. Pt verbalized understanding.  1140 BetaMethasone was given and order received to discharge pt home.  1150 discharge instructions given and pt was discharged home in stable condition.   Statement Selected

## 2021-07-22 ENCOUNTER — APPOINTMENT (OUTPATIENT)
Dept: RADIOLOGY | Facility: MEDICAL CENTER | Age: 26
End: 2021-07-22
Attending: EMERGENCY MEDICINE
Payer: MEDICAID

## 2021-07-22 ENCOUNTER — HOSPITAL ENCOUNTER (EMERGENCY)
Facility: MEDICAL CENTER | Age: 26
End: 2021-07-23
Attending: EMERGENCY MEDICINE
Payer: MEDICAID

## 2021-07-22 DIAGNOSIS — R10.11 RIGHT UPPER QUADRANT ABDOMINAL PAIN: ICD-10-CM

## 2021-07-22 LAB
ALBUMIN SERPL BCP-MCNC: 4.2 G/DL (ref 3.2–4.9)
ALBUMIN/GLOB SERPL: 1.4 G/DL
ALP SERPL-CCNC: 61 U/L (ref 30–99)
ALT SERPL-CCNC: 14 U/L (ref 2–50)
ANION GAP SERPL CALC-SCNC: 8 MMOL/L (ref 7–16)
APPEARANCE UR: CLEAR
AST SERPL-CCNC: 18 U/L (ref 12–45)
BASOPHILS # BLD AUTO: 0.2 % (ref 0–1.8)
BASOPHILS # BLD: 0.02 K/UL (ref 0–0.12)
BILIRUB SERPL-MCNC: 0.2 MG/DL (ref 0.1–1.5)
BILIRUB UR QL STRIP.AUTO: NEGATIVE
BUN SERPL-MCNC: 13 MG/DL (ref 8–22)
CALCIUM SERPL-MCNC: 9.2 MG/DL (ref 8.4–10.2)
CHLORIDE SERPL-SCNC: 106 MMOL/L (ref 96–112)
CO2 SERPL-SCNC: 25 MMOL/L (ref 20–33)
COLOR UR: YELLOW
CREAT SERPL-MCNC: 0.96 MG/DL (ref 0.5–1.4)
EOSINOPHIL # BLD AUTO: 0.07 K/UL (ref 0–0.51)
EOSINOPHIL NFR BLD: 0.8 % (ref 0–6.9)
ERYTHROCYTE [DISTWIDTH] IN BLOOD BY AUTOMATED COUNT: 40.8 FL (ref 35.9–50)
GLOBULIN SER CALC-MCNC: 3.1 G/DL (ref 1.9–3.5)
GLUCOSE SERPL-MCNC: 106 MG/DL (ref 65–99)
GLUCOSE UR STRIP.AUTO-MCNC: NEGATIVE MG/DL
HCG SERPL QL: NEGATIVE
HCT VFR BLD AUTO: 40.4 % (ref 37–47)
HGB BLD-MCNC: 13.2 G/DL (ref 12–16)
IMM GRANULOCYTES # BLD AUTO: 0.02 K/UL (ref 0–0.11)
IMM GRANULOCYTES NFR BLD AUTO: 0.2 % (ref 0–0.9)
KETONES UR STRIP.AUTO-MCNC: NEGATIVE MG/DL
LEUKOCYTE ESTERASE UR QL STRIP.AUTO: NEGATIVE
LIPASE SERPL-CCNC: 79 U/L (ref 7–58)
LYMPHOCYTES # BLD AUTO: 3.78 K/UL (ref 1–4.8)
LYMPHOCYTES NFR BLD: 45.8 % (ref 22–41)
MCH RBC QN AUTO: 30.9 PG (ref 27–33)
MCHC RBC AUTO-ENTMCNC: 32.7 G/DL (ref 33.6–35)
MCV RBC AUTO: 94.6 FL (ref 81.4–97.8)
MICRO URNS: NORMAL
MONOCYTES # BLD AUTO: 0.59 K/UL (ref 0–0.85)
MONOCYTES NFR BLD AUTO: 7.2 % (ref 0–13.4)
NEUTROPHILS # BLD AUTO: 3.77 K/UL (ref 2–7.15)
NEUTROPHILS NFR BLD: 45.8 % (ref 44–72)
NITRITE UR QL STRIP.AUTO: NEGATIVE
NRBC # BLD AUTO: 0 K/UL
NRBC BLD-RTO: 0 /100 WBC
PH UR STRIP.AUTO: 5.5 [PH] (ref 5–8)
PLATELET # BLD AUTO: 315 K/UL (ref 164–446)
PMV BLD AUTO: 9.1 FL (ref 9–12.9)
POTASSIUM SERPL-SCNC: 3.9 MMOL/L (ref 3.6–5.5)
PROT SERPL-MCNC: 7.3 G/DL (ref 6–8.2)
PROT UR QL STRIP: NEGATIVE MG/DL
RBC # BLD AUTO: 4.27 M/UL (ref 4.2–5.4)
RBC UR QL AUTO: NEGATIVE
SODIUM SERPL-SCNC: 139 MMOL/L (ref 135–145)
SP GR UR STRIP.AUTO: >=1.03
WBC # BLD AUTO: 8.3 K/UL (ref 4.8–10.8)

## 2021-07-22 PROCEDURE — 81003 URINALYSIS AUTO W/O SCOPE: CPT

## 2021-07-22 PROCEDURE — 83690 ASSAY OF LIPASE: CPT

## 2021-07-22 PROCEDURE — 80053 COMPREHEN METABOLIC PANEL: CPT

## 2021-07-22 PROCEDURE — 76705 ECHO EXAM OF ABDOMEN: CPT

## 2021-07-22 PROCEDURE — 85025 COMPLETE CBC W/AUTO DIFF WBC: CPT

## 2021-07-22 PROCEDURE — 84703 CHORIONIC GONADOTROPIN ASSAY: CPT

## 2021-07-22 PROCEDURE — 99284 EMERGENCY DEPT VISIT MOD MDM: CPT

## 2021-07-23 VITALS
HEIGHT: 60 IN | WEIGHT: 163.14 LBS | HEART RATE: 89 BPM | OXYGEN SATURATION: 97 % | TEMPERATURE: 97.4 F | RESPIRATION RATE: 18 BRPM | BODY MASS INDEX: 32.03 KG/M2 | SYSTOLIC BLOOD PRESSURE: 125 MMHG | DIASTOLIC BLOOD PRESSURE: 80 MMHG

## 2021-07-23 NOTE — ED NOTES
Patient discharged home in stable condition  AVS provided with recommended follow up and home care instructions and education information  No prescriptions provided at this time  Patient verbalized understanding  Ambulatory at time of discharge

## 2021-07-23 NOTE — ED PROVIDER NOTES
"ED Provider Note    CHIEF COMPLAINT  Chief Complaint   Patient presents with   • Abdominal Pain     States for the past 7 months she has been having severe upper quadrant pain. States she tried changing her diet and nothing has worked. States she not been able to get in to see her PCP. States she is having constnt \"gooy poops.\"   • Vomiting       HPI  Marlys Jane is a 26 y.o. female who presents to the emergency department complaining of 7 months of mucousy oily stool.  The patient says that after eating she typically develops a right upper quadrant pain and then shortly thereafter has a mucousy oily bowel movement.  She does not recognize any other specific exacerbating or alleviating factors or precipitating events she does not been able to see her primary care doctor and is never seen a gastroenterologist.  As far she knows no other family members have similar symptoms.    REVIEW OF SYSTEMS no fever or chills no cough no difficulty breathing no black or bloody stool.  All other systems negative    PAST MEDICAL HISTORY  Past Medical History:   Diagnosis Date   • Anxiety 2017   • Gall stones    • Kidney disease     kidney stones       FAMILY HISTORY  Family History   Problem Relation Age of Onset   • No Known Problems Maternal Grandmother    • No Known Problems Maternal Grandfather    • No Known Problems Paternal Grandmother    • No Known Problems Paternal Grandfather        SOCIAL HISTORY  Social History     Socioeconomic History   • Marital status: Single     Spouse name: Not on file   • Number of children: Not on file   • Years of education: Not on file   • Highest education level: Not on file   Occupational History   • Not on file   Tobacco Use   • Smoking status: Never Smoker   • Smokeless tobacco: Never Used   Substance and Sexual Activity   • Alcohol use: Not Currently     Comment: occasional   • Drug use: No   • Sexual activity: Yes     Partners: Male     Birth control/protection: Surgical, I.U.D.     " Comment: Paragard on 8/29/2019   Other Topics Concern   • Not on file   Social History Narrative   • Not on file     Social Determinants of Health     Financial Resource Strain:    • Difficulty of Paying Living Expenses:    Food Insecurity:    • Worried About Running Out of Food in the Last Year:    • Ran Out of Food in the Last Year:    Transportation Needs:    • Lack of Transportation (Medical):    • Lack of Transportation (Non-Medical):    Physical Activity:    • Days of Exercise per Week:    • Minutes of Exercise per Session:    Stress:    • Feeling of Stress :    Social Connections:    • Frequency of Communication with Friends and Family:    • Frequency of Social Gatherings with Friends and Family:    • Attends [employer]ze Services:    • Active Member of Clubs or Organizations:    • Attends Club or Organization Meetings:    • Marital Status:    Intimate Partner Violence:    • Fear of Current or Ex-Partner:    • Emotionally Abused:    • Physically Abused:    • Sexually Abused:        SURGICAL HISTORY  Past Surgical History:   Procedure Laterality Date   • URETEROSCOPY Right 12/9/2018    Procedure: URETEROSCOPY LASER LITHO;  Surgeon: Reynaldo Bajwa M.D.;  Location: SURGERY [facility];  Service: Urology   • CYSTOSCOPY STENT PLACEMENT  12/9/2018    Procedure: CYSTOSCOPY;  Surgeon: Reynaldo Bajwa M.D.;  Location: SURGERY [facility];  Service: Urology   • CYSTOSCOPY STENT PLACEMENT N/A 5/17/2016    Procedure: CYSTOSCOPY STENT PLACEMENT;  Surgeon: Sherif Lees M.D.;  Location: SURGERY [facility];  Service:    • URETEROSCOPY Right 5/17/2016    Procedure: URETEROSCOPY;  Surgeon: Sherif Lees M.D.;  Location: SURGERY [facility];  Service:    • LASERTRIPSY Right 5/17/2016    Procedure: LASERTRIPSY LITHO;  Surgeon: Sherif Lees M.D.;  Location: SURGERY [facility];  Service:        CURRENT MEDICATIONS  Home Medications     Reviewed by Lauren Wood R.N. (Registered Nurse) on 07/22/21 at  2229  Med List Status: Not Addressed   Medication Last Dose Status        Patient Jewel Taking any Medications                       ALLERGIES  No Known Allergies    PHYSICAL EXAM  VITAL SIGNS: /75   Pulse 78   Temp 36.3 °C (97.4 °F) (Temporal)   Resp 18   Ht 1.524 m (5')   Wt 74 kg (163 lb 2.3 oz)   SpO2 98%   BMI 31.86 kg/m²    Oxygen saturation is interpreted as adequate  Constitutional: Awake lucid verbal well-appearing individual in no distress  Eyes: No erythema discharge or jaundice  Neck: Trachea midline no JVD  Cardiovascular: Regular rate and rhythm  Lungs: Clear and equal bilaterally with no apparent difficulty breathing  Abdomen/Back: Soft nontender nondistended no rebound guarding or peritoneal findings at this time and no right upper quadrant tenderness with palpation no Whitman sign.  The patient does indicate that when she has pain it is in the right upper abdominal quadrant  Skin: Warm and dry  Musculoskeletal: No acute bony deformity  Neurologic: Awake verbal and moving all extremities    Laboratory  CBC shows normal white blood cell count of 8.3 hemoglobin is adequate 13.2 complete metabolic panel and lipase are unremarkable urinalysis negative for nitrite leukocyte Estrace and blood in the pregnancy test negative    Radiology  US-RUQ   Final Result         1.  Echogenic liver compatible with fatty change versus fibrosis.              MEDICAL DECISION MAKING and DISPOSITION  In the emergency department the patient generally looks well I reviewed all the findings with her.  At this point in time I think it is safe for her to go home I think that she would benefit from seeing a gastroenterologist for consultation and I have advised her to call 's office first thing tomorrow morning and arrange office recheck during the week.  She may return here if she feels she is developing new or worsening symptoms    IMPRESSION  1.  Right upper quadrant abdominal pain  2.  Abnormal bowel  movement      Electronically signed by: Yobani Ware M.D., 7/23/2021 12:34 AM

## 2021-07-23 NOTE — DISCHARGE INSTRUCTIONS
Return here if you develop new or worsening symptoms otherwise call the gastroenterology office and arrange follow-up as soon as possible

## 2021-07-26 ENCOUNTER — HOSPITAL ENCOUNTER (EMERGENCY)
Facility: MEDICAL CENTER | Age: 26
End: 2021-07-26
Attending: EMERGENCY MEDICINE
Payer: MEDICAID

## 2021-07-26 VITALS
OXYGEN SATURATION: 96 % | HEIGHT: 60 IN | HEART RATE: 98 BPM | RESPIRATION RATE: 16 BRPM | TEMPERATURE: 98.6 F | BODY MASS INDEX: 31.41 KG/M2 | DIASTOLIC BLOOD PRESSURE: 63 MMHG | WEIGHT: 160 LBS | SYSTOLIC BLOOD PRESSURE: 114 MMHG

## 2021-07-26 DIAGNOSIS — I47.10 SVT (SUPRAVENTRICULAR TACHYCARDIA) (HCC): ICD-10-CM

## 2021-07-26 LAB
ALBUMIN SERPL BCP-MCNC: 4 G/DL (ref 3.2–4.9)
ALBUMIN/GLOB SERPL: 1.3 G/DL
ALP SERPL-CCNC: 58 U/L (ref 30–99)
ALT SERPL-CCNC: 15 U/L (ref 2–50)
ANION GAP SERPL CALC-SCNC: 14 MMOL/L (ref 7–16)
AST SERPL-CCNC: 21 U/L (ref 12–45)
BASOPHILS # BLD AUTO: 0.3 % (ref 0–1.8)
BASOPHILS # BLD: 0.03 K/UL (ref 0–0.12)
BILIRUB SERPL-MCNC: 0.2 MG/DL (ref 0.1–1.5)
BUN SERPL-MCNC: 9 MG/DL (ref 8–22)
CALCIUM SERPL-MCNC: 8.5 MG/DL (ref 8.5–10.5)
CHLORIDE SERPL-SCNC: 108 MMOL/L (ref 96–112)
CO2 SERPL-SCNC: 19 MMOL/L (ref 20–33)
CREAT SERPL-MCNC: 0.76 MG/DL (ref 0.5–1.4)
EKG IMPRESSION: NORMAL
EOSINOPHIL # BLD AUTO: 0.05 K/UL (ref 0–0.51)
EOSINOPHIL NFR BLD: 0.6 % (ref 0–6.9)
ERYTHROCYTE [DISTWIDTH] IN BLOOD BY AUTOMATED COUNT: 40.3 FL (ref 35.9–50)
GLOBULIN SER CALC-MCNC: 3 G/DL (ref 1.9–3.5)
GLUCOSE SERPL-MCNC: 89 MG/DL (ref 65–99)
HCG SERPL QL: NEGATIVE
HCT VFR BLD AUTO: 37.1 % (ref 37–47)
HGB BLD-MCNC: 12.3 G/DL (ref 12–16)
IMM GRANULOCYTES # BLD AUTO: 0.02 K/UL (ref 0–0.11)
IMM GRANULOCYTES NFR BLD AUTO: 0.2 % (ref 0–0.9)
LYMPHOCYTES # BLD AUTO: 2.55 K/UL (ref 1–4.8)
LYMPHOCYTES NFR BLD: 28.8 % (ref 22–41)
MCH RBC QN AUTO: 31 PG (ref 27–33)
MCHC RBC AUTO-ENTMCNC: 33.2 G/DL (ref 33.6–35)
MCV RBC AUTO: 93.5 FL (ref 81.4–97.8)
MONOCYTES # BLD AUTO: 0.49 K/UL (ref 0–0.85)
MONOCYTES NFR BLD AUTO: 5.5 % (ref 0–13.4)
NEUTROPHILS # BLD AUTO: 5.72 K/UL (ref 2–7.15)
NEUTROPHILS NFR BLD: 64.6 % (ref 44–72)
NRBC # BLD AUTO: 0 K/UL
NRBC BLD-RTO: 0 /100 WBC
PLATELET # BLD AUTO: 289 K/UL (ref 164–446)
PMV BLD AUTO: 9.4 FL (ref 9–12.9)
POTASSIUM SERPL-SCNC: 3.7 MMOL/L (ref 3.6–5.5)
PROT SERPL-MCNC: 7 G/DL (ref 6–8.2)
RBC # BLD AUTO: 3.97 M/UL (ref 4.2–5.4)
SODIUM SERPL-SCNC: 141 MMOL/L (ref 135–145)
TSH SERPL DL<=0.005 MIU/L-ACNC: 2.79 UIU/ML (ref 0.38–5.33)
WBC # BLD AUTO: 8.9 K/UL (ref 4.8–10.8)

## 2021-07-26 PROCEDURE — 96374 THER/PROPH/DIAG INJ IV PUSH: CPT

## 2021-07-26 PROCEDURE — 700105 HCHG RX REV CODE 258: Performed by: EMERGENCY MEDICINE

## 2021-07-26 PROCEDURE — 700101 HCHG RX REV CODE 250: Performed by: EMERGENCY MEDICINE

## 2021-07-26 PROCEDURE — 85025 COMPLETE CBC W/AUTO DIFF WBC: CPT

## 2021-07-26 PROCEDURE — 84443 ASSAY THYROID STIM HORMONE: CPT

## 2021-07-26 PROCEDURE — 80053 COMPREHEN METABOLIC PANEL: CPT

## 2021-07-26 PROCEDURE — 99285 EMERGENCY DEPT VISIT HI MDM: CPT

## 2021-07-26 PROCEDURE — 93005 ELECTROCARDIOGRAM TRACING: CPT | Performed by: EMERGENCY MEDICINE

## 2021-07-26 PROCEDURE — 84703 CHORIONIC GONADOTROPIN ASSAY: CPT

## 2021-07-26 PROCEDURE — 93005 ELECTROCARDIOGRAM TRACING: CPT

## 2021-07-26 RX ORDER — SODIUM CHLORIDE 9 MG/ML
1000 INJECTION, SOLUTION INTRAVENOUS ONCE
Status: COMPLETED | OUTPATIENT
Start: 2021-07-26 | End: 2021-07-26

## 2021-07-26 RX ORDER — METOPROLOL TARTRATE 1 MG/ML
5 INJECTION, SOLUTION INTRAVENOUS ONCE
Status: COMPLETED | OUTPATIENT
Start: 2021-07-26 | End: 2021-07-26

## 2021-07-26 RX ORDER — ATENOLOL 25 MG/1
25 TABLET ORAL DAILY
Qty: 30 TABLET | Refills: 0 | Status: SHIPPED | OUTPATIENT
Start: 2021-07-26 | End: 2021-08-02 | Stop reason: SDUPTHER

## 2021-07-26 RX ADMIN — METOPROLOL TARTRATE 5 MG: 5 INJECTION INTRAVENOUS at 15:20

## 2021-07-26 RX ADMIN — SODIUM CHLORIDE 1000 ML: 9 INJECTION, SOLUTION INTRAVENOUS at 15:20

## 2021-07-26 ASSESSMENT — FIBROSIS 4 INDEX: FIB4 SCORE: 0.43

## 2021-07-26 NOTE — ED TRIAGE NOTES
Chief Complaint   Patient presents with   • Palpitations   • Dizziness     Pt BIBA from home for above complaint. Pt was sitting at kitchen table when she began feeling dizzy and having palpitations. Upon EMS arrival pt was found to be in SVT. Pt given 1L NS bolus and 6 mg adenosine and converted to sinus rhythm. Pt reporting pressure in chest at this time. . Pt A+Ox4.

## 2021-07-29 ENCOUNTER — APPOINTMENT (OUTPATIENT)
Dept: RADIOLOGY | Facility: MEDICAL CENTER | Age: 26
End: 2021-07-29
Attending: EMERGENCY MEDICINE
Payer: MEDICAID

## 2021-07-29 ENCOUNTER — HOSPITAL ENCOUNTER (EMERGENCY)
Facility: MEDICAL CENTER | Age: 26
End: 2021-07-30
Attending: EMERGENCY MEDICINE
Payer: MEDICAID

## 2021-07-29 VITALS
DIASTOLIC BLOOD PRESSURE: 63 MMHG | TEMPERATURE: 98.3 F | WEIGHT: 165.34 LBS | OXYGEN SATURATION: 96 % | RESPIRATION RATE: 18 BRPM | HEIGHT: 60 IN | HEART RATE: 95 BPM | SYSTOLIC BLOOD PRESSURE: 117 MMHG | BODY MASS INDEX: 32.46 KG/M2

## 2021-07-29 DIAGNOSIS — I47.10 SVT (SUPRAVENTRICULAR TACHYCARDIA) (HCC): ICD-10-CM

## 2021-07-29 LAB
ALBUMIN SERPL BCP-MCNC: 4.2 G/DL (ref 3.2–4.9)
ALBUMIN/GLOB SERPL: 1.2 G/DL
ALP SERPL-CCNC: 62 U/L (ref 30–99)
ALT SERPL-CCNC: 24 U/L (ref 2–50)
ANION GAP SERPL CALC-SCNC: 11 MMOL/L (ref 7–16)
AST SERPL-CCNC: 18 U/L (ref 12–45)
BASOPHILS # BLD AUTO: 0.3 % (ref 0–1.8)
BASOPHILS # BLD: 0.03 K/UL (ref 0–0.12)
BILIRUB SERPL-MCNC: 0.2 MG/DL (ref 0.1–1.5)
BUN SERPL-MCNC: 15 MG/DL (ref 8–22)
CALCIUM SERPL-MCNC: 9.3 MG/DL (ref 8.4–10.2)
CHLORIDE SERPL-SCNC: 104 MMOL/L (ref 96–112)
CO2 SERPL-SCNC: 23 MMOL/L (ref 20–33)
CREAT SERPL-MCNC: 0.77 MG/DL (ref 0.5–1.4)
EKG IMPRESSION: NORMAL
EOSINOPHIL # BLD AUTO: 0.11 K/UL (ref 0–0.51)
EOSINOPHIL NFR BLD: 1.2 % (ref 0–6.9)
ERYTHROCYTE [DISTWIDTH] IN BLOOD BY AUTOMATED COUNT: 40.4 FL (ref 35.9–50)
GLOBULIN SER CALC-MCNC: 3.4 G/DL (ref 1.9–3.5)
GLUCOSE SERPL-MCNC: 80 MG/DL (ref 65–99)
HCT VFR BLD AUTO: 39.1 % (ref 37–47)
HGB BLD-MCNC: 12.9 G/DL (ref 12–16)
IMM GRANULOCYTES # BLD AUTO: 0.03 K/UL (ref 0–0.11)
IMM GRANULOCYTES NFR BLD AUTO: 0.3 % (ref 0–0.9)
LYMPHOCYTES # BLD AUTO: 3.34 K/UL (ref 1–4.8)
LYMPHOCYTES NFR BLD: 37 % (ref 22–41)
MCH RBC QN AUTO: 31 PG (ref 27–33)
MCHC RBC AUTO-ENTMCNC: 33 G/DL (ref 33.6–35)
MCV RBC AUTO: 94 FL (ref 81.4–97.8)
MONOCYTES # BLD AUTO: 0.53 K/UL (ref 0–0.85)
MONOCYTES NFR BLD AUTO: 5.9 % (ref 0–13.4)
NEUTROPHILS # BLD AUTO: 4.99 K/UL (ref 2–7.15)
NEUTROPHILS NFR BLD: 55.3 % (ref 44–72)
NRBC # BLD AUTO: 0 K/UL
NRBC BLD-RTO: 0 /100 WBC
PLATELET # BLD AUTO: 322 K/UL (ref 164–446)
PMV BLD AUTO: 9.4 FL (ref 9–12.9)
POTASSIUM SERPL-SCNC: 3.8 MMOL/L (ref 3.6–5.5)
PROT SERPL-MCNC: 7.6 G/DL (ref 6–8.2)
RBC # BLD AUTO: 4.16 M/UL (ref 4.2–5.4)
SODIUM SERPL-SCNC: 138 MMOL/L (ref 135–145)
TROPONIN T SERPL-MCNC: <6 NG/L (ref 6–19)
TSH SERPL DL<=0.005 MIU/L-ACNC: 3.23 UIU/ML (ref 0.38–5.33)
WBC # BLD AUTO: 9 K/UL (ref 4.8–10.8)

## 2021-07-29 PROCEDURE — 700117 HCHG RX CONTRAST REV CODE 255: Performed by: EMERGENCY MEDICINE

## 2021-07-29 PROCEDURE — 85025 COMPLETE CBC W/AUTO DIFF WBC: CPT

## 2021-07-29 PROCEDURE — 93005 ELECTROCARDIOGRAM TRACING: CPT | Performed by: EMERGENCY MEDICINE

## 2021-07-29 PROCEDURE — 84484 ASSAY OF TROPONIN QUANT: CPT

## 2021-07-29 PROCEDURE — 84443 ASSAY THYROID STIM HORMONE: CPT

## 2021-07-29 PROCEDURE — 80053 COMPREHEN METABOLIC PANEL: CPT

## 2021-07-29 PROCEDURE — 99283 EMERGENCY DEPT VISIT LOW MDM: CPT

## 2021-07-29 PROCEDURE — 71275 CT ANGIOGRAPHY CHEST: CPT

## 2021-07-29 PROCEDURE — 93005 ELECTROCARDIOGRAM TRACING: CPT

## 2021-07-29 RX ADMIN — IOHEXOL 52 ML: 350 INJECTION, SOLUTION INTRAVENOUS at 23:12

## 2021-07-29 ASSESSMENT — FIBROSIS 4 INDEX: FIB4 SCORE: 0.53

## 2021-07-30 NOTE — ED PROVIDER NOTES
"ED Provider Note    CHIEF COMPLAINT  Chief Complaint   Patient presents with   • Rapid Heart Beat     Patient report of fast heart rate with blurred vision and difficulty breathing while driving home and feeling like passing out today and occurred two days ago.  Seen two days ago at [facility].  Speaks in full sentences.    • Difficulty Breathing       HPI  Marlys Jane is a 26 y.o. female who presents with a chief complaint of rapid heartbeat.  Just started today again it was while she was driving from [place] into [place].  She felt lightheaded initially eyes got blurry she does not wear her glasses at night which she thought that was it but then she felt really diaphoretic and she was about to pass out.  Her apple watch went off noting that her heart rate is 200 she took water and splashed on her face.  She was told that this could help it as by the paramedics last time this resolved her heart rate and she is here for repeat evaluation she is complaining some chest discomfort in her whole front of her chest for the whole day.  Today.  She does take birth control pills no history of PE or DVT.    She states that she got her legs massage he felt \"a knot\" in the left leg.    The patient denies any drug use.        REVIEW OF SYSTEMS  General: No fever or chills.  Eyes: No eye discharge. No eye pain.  Ear nose throat: No sore throat or  trouble swallowing.  Pulmonary: No shortness of breath or cough.  Cardiovascular: See above  GI: No abdominal pain nausea or vomiting.  : No dysuria or hematuria  Dermatologic: No rashes. No abrasions.  Neurologic: No weakness or numbness.  Gastro: No stress      PAST MEDICAL HISTORY  Past Medical History:   Diagnosis Date   • Anxiety 2017   • Gall stones    • Kidney disease     kidney stones       FAMILY HISTORY  Family History   Problem Relation Age of Onset   • No Known Problems Maternal Grandmother    • No Known Problems Maternal Grandfather    • No Known Problems " Paternal Grandmother    • No Known Problems Paternal Grandfather        SOCIAL HISTORY  Social History     Socioeconomic History   • Marital status: Single     Spouse name: Not on file   • Number of children: Not on file   • Years of education: Not on file   • Highest education level: Not on file   Occupational History   • Not on file   Tobacco Use   • Smoking status: Never Smoker   • Smokeless tobacco: Never Used   Vaping Use   • Vaping Use: Never assessed   Substance and Sexual Activity   • Alcohol use: Not Currently     Comment: occasional   • Drug use: Yes     Types: Inhaled     Comment: marijuana last use a week ago    • Sexual activity: Yes     Partners: Male     Birth control/protection: Surgical, I.U.D.     Comment: Paragard on 8/29/2019   Other Topics Concern   • Not on file   Social History Narrative   • Not on file     Social Determinants of Health     Financial Resource Strain:    • Difficulty of Paying Living Expenses:    Food Insecurity:    • Worried About Running Out of Food in the Last Year:    • Ran Out of Food in the Last Year:    Transportation Needs:    • Lack of Transportation (Medical):    • Lack of Transportation (Non-Medical):    Physical Activity:    • Days of Exercise per Week:    • Minutes of Exercise per Session:    Stress:    • Feeling of Stress :    Social Connections:    • Frequency of Communication with Friends and Family:    • Frequency of Social Gatherings with Friends and Family:    • Attends [employer] Services:    • Active Member of Clubs or Organizations:    • Attends Club or Organization Meetings:    • Marital Status:    Intimate Partner Violence:    • Fear of Current or Ex-Partner:    • Emotionally Abused:    • Physically Abused:    • Sexually Abused:        SURGICAL HISTORY  Past Surgical History:   Procedure Laterality Date   • URETEROSCOPY Right 12/9/2018    Procedure: URETEROSCOPY LASER LITHO;  Surgeon: Reynaldo Bajwa M.D.;  Location: SURGERY [facility];  Service:  Urology   • CYSTOSCOPY STENT PLACEMENT  12/9/2018    Procedure: CYSTOSCOPY;  Surgeon: Reynaldo Bajwa M.D.;  Location: SURGERY [facility];  Service: Urology   • CYSTOSCOPY STENT PLACEMENT N/A 5/17/2016    Procedure: CYSTOSCOPY STENT PLACEMENT;  Surgeon: Sherif Lees M.D.;  Location: SURGERY [facility];  Service:    • URETEROSCOPY Right 5/17/2016    Procedure: URETEROSCOPY;  Surgeon: Sherif Lees M.D.;  Location: SURGERY [facility];  Service:    • LASERTRIPSY Right 5/17/2016    Procedure: LASERTRIPSY LITHO;  Surgeon: Sherif Lees M.D.;  Location: SURGERY [facility];  Service:        CURRENT MEDICATIONS  Home Medications     Reviewed by Leonila Joy R.N. (Registered Nurse) on 07/29/21 at 2038  Med List Status: Complete   Medication Last Dose Status   atenolol (TENORMIN) 25 MG Tab 7/29/2021 Active                ALLERGIES  No Known Allergies    PHYSICAL EXAM  VITAL SIGNS: /63   Pulse 95   Temp 36.8 °C (98.3 °F) (Tympanic)   Resp 18   Ht 1.524 m (5')   Wt 75 kg (165 lb 5.5 oz)   LMP 07/05/2021 (Approximate)   SpO2 96%   BMI 32.29 kg/m²    Constitutional: Well developed, Well nourished, no acute distress,   HENT: Normocephalic, Atraumatic, Oropharynx moist, No oral exudates, Nose normal.   Eyes: PERRLA, EOMI, Conjunctiva normal, No discharge.   Musculoskeletal: Neck normal range of motion, No tenderness, Supple,  Cardiovascular: Get a rhythm rate of 96 no murmurs.  Negative Homans negative cords no pedal edema  Thorax & Lungs: There to auscultation bilaterally  Abdomen: Bowel sounds normal, Soft, No tenderness, No masses, No pulsatile masses.   Skin: Warm, Dry, No erythema, No rash.   : No CVA tenderness.   Neurologic: Alert & oriented , moves all extremities equally  Psychiatric:  Calm, not anxious        RADIOLOGY/PROCEDURES  Results for orders placed or performed during the hospital encounter of 07/29/21   TSH (for screening thyroid dysfunction)   Result Value Ref Range     TSH 3.230 0.380 - 5.330 uIU/mL   CBC w/ Differential   Result Value Ref Range    WBC 9.0 4.8 - 10.8 K/uL    RBC 4.16 (L) 4.20 - 5.40 M/uL    Hemoglobin 12.9 12.0 - 16.0 g/dL    Hematocrit 39.1 37.0 - 47.0 %    MCV 94.0 81.4 - 97.8 fL    MCH 31.0 27.0 - 33.0 pg    MCHC 33.0 (L) 33.6 - 35.0 g/dL    RDW 40.4 35.9 - 50.0 fL    Platelet Count 322 164 - 446 K/uL    MPV 9.4 9.0 - 12.9 fL    Neutrophils-Polys 55.30 44.00 - 72.00 %    Lymphocytes 37.00 22.00 - 41.00 %    Monocytes 5.90 0.00 - 13.40 %    Eosinophils 1.20 0.00 - 6.90 %    Basophils 0.30 0.00 - 1.80 %    Immature Granulocytes 0.30 0.00 - 0.90 %    Nucleated RBC 0.00 /100 WBC    Neutrophils (Absolute) 4.99 2.00 - 7.15 K/uL    Lymphs (Absolute) 3.34 1.00 - 4.80 K/uL    Monos (Absolute) 0.53 0.00 - 0.85 K/uL    Eos (Absolute) 0.11 0.00 - 0.51 K/uL    Baso (Absolute) 0.03 0.00 - 0.12 K/uL    Immature Granulocytes (abs) 0.03 0.00 - 0.11 K/uL    NRBC (Absolute) 0.00 K/uL   Complete Metabolic Panel (CMP)   Result Value Ref Range    Sodium 138 135 - 145 mmol/L    Potassium 3.8 3.6 - 5.5 mmol/L    Chloride 104 96 - 112 mmol/L    Co2 23 20 - 33 mmol/L    Anion Gap 11.0 7.0 - 16.0    Glucose 80 65 - 99 mg/dL    Bun 15 8 - 22 mg/dL    Creatinine 0.77 0.50 - 1.40 mg/dL    Calcium 9.3 8.4 - 10.2 mg/dL    AST(SGOT) 18 12 - 45 U/L    ALT(SGPT) 24 2 - 50 U/L    Alkaline Phosphatase 62 30 - 99 U/L    Total Bilirubin 0.2 0.1 - 1.5 mg/dL    Albumin 4.2 3.2 - 4.9 g/dL    Total Protein 7.6 6.0 - 8.2 g/dL    Globulin 3.4 1.9 - 3.5 g/dL    A-G Ratio 1.2 g/dL   Troponin STAT   Result Value Ref Range    Troponin T <6 6 - 19 ng/L   ESTIMATED GFR   Result Value Ref Range    GFR If African American >60 >60 mL/min/1.73 m 2    GFR If Non African American >60 >60 mL/min/1.73 m 2   EKG   Result Value Ref Range    Report       [facility] Emergency Dept.    Test Date:  2021-07-29  Pt Name:    CLARIBEL NAN                  Department: [facility]  MRN:        [id number]                       Room:  Gender:     Female                       Technician: TCM  :        1995                   Requested By:ER TRIAGE PROTOCOL  Order #:    [id number]                    Reading MD: Barrett PUTNAM MD    Measurements  Intervals                                Axis  Rate:       97                           P:          71  NY:         165                          QRS:        76  QRSD:       88                           T:          68  QT:         341  QTc:        433    Interpretive Statements  Normal sinus rhythm.  Rate 97  Normal NY.  Normal QRS normal axis.  No S1Q3T3 noted.  No ST segment elevations or depressions.  Interpreted by me to be normal no  acute ischemic findings  Electronically Signed On 2021 21:54:32 PDT by Barrett PUTNAM MD          COURSE & MEDICAL DECISION MAKING  Pertinent Labs & Imaging studies reviewed. (See chart for details)  Pleasant 26-year-old female with a history of SVT previously patient has been taking her beta-blocker but has another episode.  This point I think further work-up to look for thyroid function anemia diabetes among others at this point the patient looks otherwise well nontoxic in addition because the patient is on birth control pills I cannot rule her out by the PERC criteria and I put the patient at medium probability.  At this point not think a D-dimer would be helpful.    CT scan of the chest showed no PE.  The patient at this point had no episodes except she said that she had an episode on the way to CAT scan by her watch this was an unwitnessed.    Is a 26-year-old female with SVT diagnosed by strep who has had no recorded episodes here with 2 episodes on a beta-blocker.  I have asked her that since it is really only day 2 or 3 that she continue taking the 25 mg atenolol that she may obtain a steady state which may help break these in addition she can increase to 50 mg daily if not improved.  I recommend taking at night as she  states that get dizzy and lightheaded and a little blurred vision after she takes the medications    This 18-year-old female with normal thyroid no DVT PE no evaded cardiac enzymes I recommend that we will follow up with cardiology waiting to call the cardiologist just to let them know in addition we will put in a cardiology consult.    FINAL IMPRESSION  1.  Suspected SVT  3.      Electronically signed by: Barrett Bernal M.D., 7/29/2021 9:56 PM

## 2021-07-30 NOTE — ED TRIAGE NOTES
26 yr old female to triage  Chief Complaint   Patient presents with   • Rapid Heart Beat     Patient report of fast heart rate with blurred vision and difficulty breathing while driving home and feeling like passing out today and occurred two days ago.  Seen two days ago at [facility].  Speaks in full sentences.    • Difficulty Breathing     /82   Pulse (!) 101   Temp 36.8 °C (98.3 °F) (Tympanic)   Resp 18   Ht 1.524 m (5')   Wt 75 kg (165 lb 5.5 oz)   LMP 07/05/2021 (Approximate)   SpO2 99%   BMI 32.29 kg/m²     Patient still pending to be evaluated by a cardiologist.

## 2021-07-30 NOTE — DISCHARGE INSTRUCTIONS
We have put an order for the cardiology team to call you.  You may call you on Friday or Monday.  If you start having more episodes I recommend you increase your atenolol to 50 mg daily.  However, I would wait until Sunday is the atenolol will take several days to build up in your bloodstream.

## 2021-08-02 ENCOUNTER — OFFICE VISIT (OUTPATIENT)
Dept: CARDIOLOGY | Facility: MEDICAL CENTER | Age: 26
End: 2021-08-02
Attending: EMERGENCY MEDICINE
Payer: MEDICAID

## 2021-08-02 VITALS
RESPIRATION RATE: 12 BRPM | DIASTOLIC BLOOD PRESSURE: 62 MMHG | BODY MASS INDEX: 31.8 KG/M2 | HEART RATE: 80 BPM | HEIGHT: 60 IN | OXYGEN SATURATION: 97 % | WEIGHT: 162 LBS | SYSTOLIC BLOOD PRESSURE: 106 MMHG

## 2021-08-02 DIAGNOSIS — I47.10 SVT (SUPRAVENTRICULAR TACHYCARDIA) (HCC): ICD-10-CM

## 2021-08-02 DIAGNOSIS — R00.2 PALPITATIONS: ICD-10-CM

## 2021-08-02 LAB — EKG IMPRESSION: NORMAL

## 2021-08-02 PROCEDURE — 99244 OFF/OP CNSLTJ NEW/EST MOD 40: CPT | Performed by: INTERNAL MEDICINE

## 2021-08-02 PROCEDURE — 93000 ELECTROCARDIOGRAM COMPLETE: CPT | Performed by: INTERNAL MEDICINE

## 2021-08-02 RX ORDER — ATENOLOL 25 MG/1
25 TABLET ORAL DAILY
Qty: 90 TABLET | Refills: 3 | Status: SHIPPED | OUTPATIENT
Start: 2021-08-02 | End: 2021-11-05

## 2021-08-02 RX ORDER — NORGESTIMATE AND ETHINYL ESTRADIOL 0.25-0.035
KIT ORAL
COMMUNITY
Start: 2021-06-01 | End: 2021-08-02

## 2021-08-02 ASSESSMENT — ENCOUNTER SYMPTOMS
TINGLING: 1
ABDOMINAL PAIN: 1
SHORTNESS OF BREATH: 1
ORTHOPNEA: 1
HEADACHES: 1
PALPITATIONS: 1
BLURRED VISION: 1
NAUSEA: 1
NERVOUS/ANXIOUS: 1
PHOTOPHOBIA: 1
DIZZINESS: 1
DIARRHEA: 1

## 2021-08-02 ASSESSMENT — FIBROSIS 4 INDEX: FIB4 SCORE: .319498662102153665

## 2021-08-02 NOTE — PROGRESS NOTES
Chief Complaint   Patient presents with   • Supraventricular Tachycardia (SVT)       Subjective:   Marlys Jane is a 26 y.o. female who presents today being seen in consult on request of Dr. Eyal Sanchez secondary to possible SVT.  2 recent emergency room visits for possible SVT.  First time came via paramedics.  Supposed heart rate in the 190s received adenosine.  Went to sinus rhythm at 150.  No documentation on rhythm strips.  Another recent ER visit recently.  Placed on atenolol.  Some atypical chest pain.  No cardiac history.  Denies smoking or alcohol use.  In school to become a dental assistant.  Not  has 4 children.  No illicit drugs.    Past Medical History:   Diagnosis Date   • Anxiety 2017   • Gall stones    • Kidney disease     kidney stones     Past Surgical History:   Procedure Laterality Date   • URETEROSCOPY Right 12/9/2018    Procedure: URETEROSCOPY LASER LITHO;  Surgeon: Reynaldo Bajwa M.D.;  Location: [facility];  Service: Urology   • CYSTOSCOPY STENT PLACEMENT  12/9/2018    Procedure: CYSTOSCOPY;  Surgeon: Reynaldo Bajwa M.D.;  Location: [facility];  Service: Urology   • CYSTOSCOPY STENT PLACEMENT N/A 5/17/2016    Procedure: CYSTOSCOPY STENT PLACEMENT;  Surgeon: Sherif Lees M.D.;  Location: [facility];  Service:    • URETEROSCOPY Right 5/17/2016    Procedure: URETEROSCOPY;  Surgeon: Sherif Lees M.D.;  Location: [facility];  Service:    • LASERTRIPSY Right 5/17/2016    Procedure: LASERTRIPSY LITHO;  Surgeon: Sherif Lees M.D.;  Location: [facility];  Service:      Family History   Problem Relation Age of Onset   • No Known Problems Maternal Grandmother    • No Known Problems Maternal Grandfather    • No Known Problems Paternal Grandmother    • No Known Problems Paternal Grandfather      Social History     Socioeconomic History   • Marital status: Single     Spouse name: Not on file   • Number of children: Not on  file   • Years of education: Not on file   • Highest education level: Not on file   Occupational History   • Not on file   Tobacco Use   • Smoking status: Never Smoker   • Smokeless tobacco: Never Used   Vaping Use   • Vaping Use: Never assessed   Substance and Sexual Activity   • Alcohol use: Not Currently     Comment: occasional   • Drug use: Yes     Types: Inhaled     Comment: marijuana last use a week ago    • Sexual activity: Yes     Partners: Male     Birth control/protection: Surgical, I.U.D.     Comment: Paragard on 8/29/2019   Other Topics Concern   • Not on file   Social History Narrative   • Not on file     Social Determinants of Health     Financial Resource Strain:    • Difficulty of Paying Living Expenses:    Food Insecurity:    • Worried About Running Out of Food in the Last Year:    • Ran Out of Food in the Last Year:    Transportation Needs:    • Lack of Transportation (Medical):    • Lack of Transportation (Non-Medical):    Physical Activity:    • Days of Exercise per Week:    • Minutes of Exercise per Session:    Stress:    • Feeling of Stress :    Social Connections:    • Frequency of Communication with Friends and Family:    • Frequency of Social Gatherings with Friends and Family:    • Attends [employer] Services:    • Active Member of Clubs or Organizations:    • Attends Club or Organization Meetings:    • Marital Status:    Intimate Partner Violence:    • Fear of Current or Ex-Partner:    • Emotionally Abused:    • Physically Abused:    • Sexually Abused:      No Known Allergies  Outpatient Encounter Medications as of 8/2/2021   Medication Sig Dispense Refill   • atenolol (TENORMIN) 25 MG Tab Take 1 tablet by mouth every day. 90 tablet 3   • [DISCONTINUED] ESTARYLLA 0.25-35 MG-MCG per tablet TAKE 1 TABLET BY MOUTH AT THE SAME TIME EVERY DAY. (Patient not taking: Reported on 8/2/2021)     • [DISCONTINUED] atenolol (TENORMIN) 25 MG Tab Take 1 tablet by mouth every day. 30 tablet 0     No  facility-administered encounter medications on file as of 8/2/2021.     Review of Systems   Constitutional: Positive for malaise/fatigue.   HENT: Positive for tinnitus.    Eyes: Positive for blurred vision and photophobia.   Respiratory: Positive for shortness of breath.    Cardiovascular: Positive for chest pain, palpitations and orthopnea.   Gastrointestinal: Positive for abdominal pain, diarrhea and nausea.   Neurological: Positive for dizziness, tingling and headaches.   Psychiatric/Behavioral: The patient is nervous/anxious.         Objective:   /62 (BP Location: Left arm, Patient Position: Sitting, BP Cuff Size: Adult)   Pulse 80   Resp 12   Ht 1.524 m (5')   Wt 73.5 kg (162 lb)   LMP 07/05/2021 (Approximate)   SpO2 97%   BMI 31.64 kg/m²     Physical Exam   Constitutional: She is oriented to person, place, and time. She appears well-developed.   HENT:   Head: Normocephalic and atraumatic.   Eyes: Pupils are equal, round, and reactive to light.   Cardiovascular: Normal rate, regular rhythm and normal heart sounds. Exam reveals no gallop and no friction rub.   No murmur heard.  Pulmonary/Chest: Effort normal and breath sounds normal.   Abdominal: Soft. Bowel sounds are normal.   Musculoskeletal:         General: Normal range of motion.      Cervical back: Normal range of motion and neck supple.   Neurological: She is alert and oriented to person, place, and time. No cranial nerve deficit.   Skin: Skin is warm.   Psychiatric: Her behavior is normal. Judgment and thought content normal.       Assessment:     1. SVT (supraventricular tachycardia) (HCC)  EKG    [facility] ZIO PATCH MONITOR   2. Palpitations         Medical Decision Making:  Today's Assessment / Status / Plan:   1.  Palpitations possible SVT versus IST although not definitive.  We will get a ZIO Patch and echocardiogram.  Also has an iWatch which she will learn how to record.  2.  Continue atenolol.  3.  Follow-up in 3 months.

## 2021-08-26 ENCOUNTER — TELEPHONE (OUTPATIENT)
Dept: CARDIOLOGY | Facility: MEDICAL CENTER | Age: 26
End: 2021-08-26

## 2021-08-26 ENCOUNTER — NON-PROVIDER VISIT (OUTPATIENT)
Dept: CARDIOLOGY | Facility: MEDICAL CENTER | Age: 26
End: 2021-08-26
Payer: MEDICAID

## 2021-08-26 DIAGNOSIS — I47.10 SVT (SUPRAVENTRICULAR TACHYCARDIA) (HCC): ICD-10-CM

## 2021-08-26 DIAGNOSIS — R00.2 PALPITATIONS: ICD-10-CM

## 2021-08-26 NOTE — TELEPHONE ENCOUNTER
Patient enrolled in the 14 day ePatch Holter monitoring program per Reynaldo Briones MD.  In office hookup.    >Currently pending EOS.

## 2021-09-17 ENCOUNTER — PATIENT MESSAGE (OUTPATIENT)
Dept: CARDIOLOGY | Facility: MEDICAL CENTER | Age: 26
End: 2021-09-17

## 2021-09-17 DIAGNOSIS — I47.10 SVT (SUPRAVENTRICULAR TACHYCARDIA) (HCC): ICD-10-CM

## 2021-09-17 DIAGNOSIS — R00.2 PALPITATIONS: ICD-10-CM

## 2021-09-27 PROCEDURE — 93228 REMOTE 30 DAY ECG REV/REPORT: CPT | Performed by: INTERNAL MEDICINE

## 2021-09-27 NOTE — TELEPHONE ENCOUNTER
Biotel EOS scanned and given to RISA's nurse on 9/27/2021 (sharing ADD duties)  DS out of office.

## 2021-09-28 ENCOUNTER — TELEPHONE (OUTPATIENT)
Dept: CARDIOLOGY | Facility: MEDICAL CENTER | Age: 26
End: 2021-09-28

## 2021-09-28 DIAGNOSIS — I47.10 SVT (SUPRAVENTRICULAR TACHYCARDIA) (HCC): ICD-10-CM

## 2021-09-28 NOTE — TELEPHONE ENCOUNTER
Echo was mentioned in 8-2-21 OV dictation but not ordered.   Do you want pt. To get echo?  To Dr. Briones.

## 2021-09-28 NOTE — TELEPHONE ENCOUNTER
Called pt. To give monitor results.  Advised her that she also needs echo; she will call to schedule it before 11-1-21 FV with Dr. Briones.  Echo ordered.

## 2021-11-05 ENCOUNTER — HOSPITAL ENCOUNTER (EMERGENCY)
Facility: MEDICAL CENTER | Age: 26
End: 2021-11-05
Attending: EMERGENCY MEDICINE
Payer: MEDICAID

## 2021-11-05 ENCOUNTER — APPOINTMENT (OUTPATIENT)
Dept: RADIOLOGY | Facility: MEDICAL CENTER | Age: 26
End: 2021-11-05
Attending: EMERGENCY MEDICINE
Payer: MEDICAID

## 2021-11-05 VITALS
TEMPERATURE: 98.7 F | OXYGEN SATURATION: 96 % | BODY MASS INDEX: 30.74 KG/M2 | SYSTOLIC BLOOD PRESSURE: 111 MMHG | DIASTOLIC BLOOD PRESSURE: 68 MMHG | HEART RATE: 82 BPM | RESPIRATION RATE: 20 BRPM | WEIGHT: 157.41 LBS

## 2021-11-05 DIAGNOSIS — R00.2 PALPITATIONS: ICD-10-CM

## 2021-11-05 DIAGNOSIS — R55 SYNCOPE, UNSPECIFIED SYNCOPE TYPE: ICD-10-CM

## 2021-11-05 DIAGNOSIS — I47.10 SVT (SUPRAVENTRICULAR TACHYCARDIA) (HCC): ICD-10-CM

## 2021-11-05 LAB
ALBUMIN SERPL BCP-MCNC: 4.1 G/DL (ref 3.2–4.9)
ALBUMIN/GLOB SERPL: 1.2 G/DL
ALP SERPL-CCNC: 62 U/L (ref 30–99)
ALT SERPL-CCNC: 9 U/L (ref 2–50)
ANION GAP SERPL CALC-SCNC: 12 MMOL/L (ref 7–16)
AST SERPL-CCNC: 11 U/L (ref 12–45)
BASOPHILS # BLD AUTO: 0.1 % (ref 0–1.8)
BASOPHILS # BLD: 0.01 K/UL (ref 0–0.12)
BILIRUB SERPL-MCNC: 0.3 MG/DL (ref 0.1–1.5)
BUN SERPL-MCNC: 5 MG/DL (ref 8–22)
CALCIUM SERPL-MCNC: 9.6 MG/DL (ref 8.5–10.5)
CHLORIDE SERPL-SCNC: 105 MMOL/L (ref 96–112)
CO2 SERPL-SCNC: 21 MMOL/L (ref 20–33)
CREAT SERPL-MCNC: 0.57 MG/DL (ref 0.5–1.4)
EKG IMPRESSION: NORMAL
EOSINOPHIL # BLD AUTO: 0.03 K/UL (ref 0–0.51)
EOSINOPHIL NFR BLD: 0.3 % (ref 0–6.9)
ERYTHROCYTE [DISTWIDTH] IN BLOOD BY AUTOMATED COUNT: 43.6 FL (ref 35.9–50)
GLOBULIN SER CALC-MCNC: 3.3 G/DL (ref 1.9–3.5)
GLUCOSE SERPL-MCNC: 102 MG/DL (ref 65–99)
HCG SERPL QL: POSITIVE
HCT VFR BLD AUTO: 37.1 % (ref 37–47)
HGB BLD-MCNC: 12.8 G/DL (ref 12–16)
IMM GRANULOCYTES # BLD AUTO: 0.04 K/UL (ref 0–0.11)
IMM GRANULOCYTES NFR BLD AUTO: 0.4 % (ref 0–0.9)
LYMPHOCYTES # BLD AUTO: 2.3 K/UL (ref 1–4.8)
LYMPHOCYTES NFR BLD: 23.8 % (ref 22–41)
MCH RBC QN AUTO: 31.8 PG (ref 27–33)
MCHC RBC AUTO-ENTMCNC: 34.5 G/DL (ref 33.6–35)
MCV RBC AUTO: 92.3 FL (ref 81.4–97.8)
MONOCYTES # BLD AUTO: 0.41 K/UL (ref 0–0.85)
MONOCYTES NFR BLD AUTO: 4.2 % (ref 0–13.4)
NEUTROPHILS # BLD AUTO: 6.89 K/UL (ref 2–7.15)
NEUTROPHILS NFR BLD: 71.2 % (ref 44–72)
NRBC # BLD AUTO: 0 K/UL
NRBC BLD-RTO: 0 /100 WBC
PLATELET # BLD AUTO: 264 K/UL (ref 164–446)
PMV BLD AUTO: 9.5 FL (ref 9–12.9)
POTASSIUM SERPL-SCNC: 4 MMOL/L (ref 3.6–5.5)
PROT SERPL-MCNC: 7.4 G/DL (ref 6–8.2)
RBC # BLD AUTO: 4.02 M/UL (ref 4.2–5.4)
SODIUM SERPL-SCNC: 138 MMOL/L (ref 135–145)
TROPONIN T SERPL-MCNC: <6 NG/L (ref 6–19)
WBC # BLD AUTO: 9.7 K/UL (ref 4.8–10.8)

## 2021-11-05 PROCEDURE — 76815 OB US LIMITED FETUS(S): CPT

## 2021-11-05 PROCEDURE — 93005 ELECTROCARDIOGRAM TRACING: CPT | Performed by: EMERGENCY MEDICINE

## 2021-11-05 PROCEDURE — 80053 COMPREHEN METABOLIC PANEL: CPT

## 2021-11-05 PROCEDURE — 84484 ASSAY OF TROPONIN QUANT: CPT

## 2021-11-05 PROCEDURE — 93005 ELECTROCARDIOGRAM TRACING: CPT

## 2021-11-05 PROCEDURE — 99284 EMERGENCY DEPT VISIT MOD MDM: CPT

## 2021-11-05 PROCEDURE — 85025 COMPLETE CBC W/AUTO DIFF WBC: CPT

## 2021-11-05 PROCEDURE — 84703 CHORIONIC GONADOTROPIN ASSAY: CPT

## 2021-11-05 RX ORDER — LABETALOL 100 MG/1
50 TABLET, FILM COATED ORAL DAILY
Qty: 60 TABLET | Refills: 0 | Status: SHIPPED | OUTPATIENT
Start: 2021-11-05

## 2021-11-05 RX ORDER — ESCITALOPRAM OXALATE 5 MG/1
10 TABLET ORAL DAILY
COMMUNITY

## 2021-11-05 RX ORDER — HYDROXYZINE PAMOATE 100 MG
100 CAPSULE ORAL
COMMUNITY

## 2021-11-05 RX ORDER — ALBUTEROL SULFATE 90 UG/1
1-2 AEROSOL, METERED RESPIRATORY (INHALATION) EVERY 4 HOURS PRN
COMMUNITY

## 2021-11-05 RX ORDER — LABETALOL 100 MG/1
100 TABLET, FILM COATED ORAL DAILY
Status: SHIPPED | COMMUNITY
End: 2021-11-05 | Stop reason: SDUPTHER

## 2021-11-05 ASSESSMENT — FIBROSIS 4 INDEX: FIB4 SCORE: .319498662102153665

## 2021-11-05 NOTE — ED TRIAGE NOTES
Pt comes in reporting she had a syncopal event today. Pt stating she was taking a nap today and woke up feeling like she was in SVT. Pt stating she walked into the bathroom and had a syncopal event.

## 2021-11-05 NOTE — ED NOTES
"Pt is 14 weeks pregnant and see's the high risk OB/GYN due to having a \"fatty liver\" and having seen the cardiologist    Is supposed to take labetalol for her SVT but it makes her feel sick so she doesn't take it, her cardiologist is aware and she is to keep it on her.      States she looked at her smart watch and noticed her heart rate was 210 then she passed out.    No further episodes  "

## 2021-11-05 NOTE — ED PROVIDER NOTES
ED Provider Note    CHIEF COMPLAINT  Chief Complaint   Patient presents with   • Syncope       HPI  Marlys Jane is a 26 y.o. female who presents after syncopal episode.  Patient is 14 weeks pregnant.  Patient has a history of SVT that began shortly after patient became pregnant months ago.  Patient has had a Holter monitor, she has known SVT.  Patient was on labetalol, 100 mg every day but was having orthostasis with this and now only takes this as needed.  Patient follows with [facility] pregnancy center.  Patient reports that today her heart rate felt very elevated, she looked at her smart watch and it was around 220, she tried her vagal maneuver in which she bears down, and then walked to the bathroom to splash cold water on her face which typically helps resolve the episodes.  Patient reports she started splashing water on her face and then woke up on the ground.  Patient denies any associated fevers or chills.  She denies any vaginal bleeding dysuria urgency or frequency.  Patient denies any associated abdominal pain.  Patient denies striking her head.  She denies any associated neck or back pain.    REVIEW OF SYSTEMS  ROS    See HPI for further details. All other systems are negative.     PAST MEDICAL HISTORY   has a past medical history of Anxiety (2017), Gall stones, and Kidney disease.    SOCIAL HISTORY  Social History     Tobacco Use   • Smoking status: Never Smoker   • Smokeless tobacco: Never Used   Vaping Use   • Vaping Use: Never assessed   Substance and Sexual Activity   • Alcohol use: Not Currently     Comment: occasional   • Drug use: Yes     Types: Inhaled     Comment: marijuana last use a week ago    • Sexual activity: Yes     Partners: Male     Birth control/protection: Surgical, I.U.D.     Comment: Paragard on 8/29/2019       SURGICAL HISTORY   has a past surgical history that includes cystoscopy stent placement (N/A, 5/17/2016); ureteroscopy (Right, 5/17/2016); lasertripsy (Right,  5/17/2016); ureteroscopy (Right, 12/9/2018); and cystoscopy stent placement (12/9/2018).    CURRENT MEDICATIONS  Home Medications     Reviewed by Pam Boateng (Pharmacy Tech) on 11/05/21 at 1635  Med List Status: Complete   Medication Last Dose Status   albuterol 108 (90 Base) MCG/ACT Aero Soln inhalation aerosol 11/5/2021 Active   escitalopram (LEXAPRO) 5 MG tablet 11/4/2021 Active   hydrOXYzine pamoate (VISTARIL) 100 MG Cap > 1 WEEK AGO Active   labetalol (NORMODYNE) 100 MG Tab ABOUT 1 MONTH AGO Active                ALLERGIES  No Known Allergies    PHYSICAL EXAM  Vitals:    11/05/21 1500   BP: 118/52   Pulse: 75   Resp: 16   Temp:    SpO2: 96%       Physical Exam  Constitutional:       Appearance: She is well-developed.   HENT:      Head: Normocephalic and atraumatic.   Eyes:      Conjunctiva/sclera: Conjunctivae normal.   Cardiovascular:      Rate and Rhythm: Normal rate and regular rhythm.   Pulmonary:      Effort: Pulmonary effort is normal.      Breath sounds: Normal breath sounds.   Abdominal:      General: Bowel sounds are normal. There is no distension.      Palpations: Abdomen is soft.      Tenderness: There is no abdominal tenderness. There is no rebound.   Musculoskeletal:      Cervical back: Normal range of motion and neck supple.      Comments: C/T/L without any midline TTP or bony abn/stepoffs     No bony abn of clavicles, shoulders, elbows wrists and hands without any TTP or major ROM limitation; compartments soft    No chest TTP on compression    Abd without any TTP or ecchymosis    Pelvis is stable on compression    BL hips, knees ankle without TTP or major limitations of ROM; compartments soft    All distal pulses are intact     no focal motor or sensory deficit        Skin:     General: Skin is warm and dry.      Findings: No rash.   Neurological:      Mental Status: She is alert and oriented to person, place, and time.   Psychiatric:         Behavior: Behavior normal.           DIAGNOSTIC  STUDIES / PROCEDURES    EKG  See below    LABS  Results for orders placed or performed during the hospital encounter of 11/05/21   CBC with Differential   Result Value Ref Range    WBC 9.7 4.8 - 10.8 K/uL    RBC 4.02 (L) 4.20 - 5.40 M/uL    Hemoglobin 12.8 12.0 - 16.0 g/dL    Hematocrit 37.1 37.0 - 47.0 %    MCV 92.3 81.4 - 97.8 fL    MCH 31.8 27.0 - 33.0 pg    MCHC 34.5 33.6 - 35.0 g/dL    RDW 43.6 35.9 - 50.0 fL    Platelet Count 264 164 - 446 K/uL    MPV 9.5 9.0 - 12.9 fL    Neutrophils-Polys 71.20 44.00 - 72.00 %    Lymphocytes 23.80 22.00 - 41.00 %    Monocytes 4.20 0.00 - 13.40 %    Eosinophils 0.30 0.00 - 6.90 %    Basophils 0.10 0.00 - 1.80 %    Immature Granulocytes 0.40 0.00 - 0.90 %    Nucleated RBC 0.00 /100 WBC    Neutrophils (Absolute) 6.89 2.00 - 7.15 K/uL    Lymphs (Absolute) 2.30 1.00 - 4.80 K/uL    Monos (Absolute) 0.41 0.00 - 0.85 K/uL    Eos (Absolute) 0.03 0.00 - 0.51 K/uL    Baso (Absolute) 0.01 0.00 - 0.12 K/uL    Immature Granulocytes (abs) 0.04 0.00 - 0.11 K/uL    NRBC (Absolute) 0.00 K/uL   Complete Metabolic Panel (CMP)   Result Value Ref Range    Sodium 138 135 - 145 mmol/L    Potassium 4.0 3.6 - 5.5 mmol/L    Chloride 105 96 - 112 mmol/L    Co2 21 20 - 33 mmol/L    Anion Gap 12.0 7.0 - 16.0    Glucose 102 (H) 65 - 99 mg/dL    Bun 5 (L) 8 - 22 mg/dL    Creatinine 0.57 0.50 - 1.40 mg/dL    Calcium 9.6 8.5 - 10.5 mg/dL    AST(SGOT) 11 (L) 12 - 45 U/L    ALT(SGPT) 9 2 - 50 U/L    Alkaline Phosphatase 62 30 - 99 U/L    Total Bilirubin 0.3 0.1 - 1.5 mg/dL    Albumin 4.1 3.2 - 4.9 g/dL    Total Protein 7.4 6.0 - 8.2 g/dL    Globulin 3.3 1.9 - 3.5 g/dL    A-G Ratio 1.2 g/dL   Troponin   Result Value Ref Range    Troponin T <6 6 - 19 ng/L   ESTIMATED GFR   Result Value Ref Range    GFR If African American >60 >60 mL/min/1.73 m 2    GFR If Non African American >60 >60 mL/min/1.73 m 2   BETA-HCG QUALITATIVE SERUM   Result Value Ref Range    Beta-Hcg Qualitative Serum Positive (A) Negative   EKG    Result Value Ref Range    Report       [facility] Emergency Dept.    Test Date:  2021  Pt Name:    CLARIBEL MONTANA                  Department: ER  MRN:        [id number]                      Room:       BL 14  Gender:     Female                       Technician: [id number]  :        [id number]                   Requested By:ER TRIAGE PROTOCOL  Order #:    [id number]                    Reading MD: Marixa Pichardo MD    Measurements  Intervals                                Axis  Rate:       106                          P:          78  SD:         128                          QRS:        68  QRSD:       86                           T:          40  QT:         336  QTc:        447    Interpretive Statements  EKG is sinus tachycardia, normal axis normal intervals no ST changes  consistent  with acute regional ischemia, no Brugada morphology, no short QT long QT,  normal  SD  Electronically Signed On 2021 16:22:37 PDT by Marixa iPchardo MD           RADIOLOGY  ,  US-OB LIMITED TRANSABDOMINAL   Final Result      Viable single intrauterine gestation of approximate gestational age of 14 weeks, 1 day.      Small subchorionic hematoma.              COURSE & MEDICAL DECISION MAKING  Pertinent Labs & Imaging studies reviewed. (See chart for details)    Patient with a history of SVT, and an episode today that is consistent with syncope secondary to SVT.  She is 14 weeks pregnant.  Her exam is very reassuring however I do believe that we should check a ultrasound to ensure there is no evidence of abruption though this would be less likely given patient is still early in her second trimester.  Patient basic labs are reassuring.  Her EKG reveals sinus tachycardia.  Patient case discussed with cardiology, they believe that we could try 50 mg daily of the labetalol and see if this can help resolve patient's symptoms.  I have also discussed with patient that instead of rushing to the bathroom to splash cold water  face she should lay down flat put her legs above her head and use a syringe to do vagal maneuver which I demonstrated to patient.  Patient's transabdominal ultrasound shows a small subchorionic hematoma which is likely unrelated.  Patient without any associated vaginal bleeding.  I have discussed the importance of her following up closely with her OB.  On-call cardiology has help schedule an appointment for patient next week with the electrophysiologist.  Return precautions discussed.     The patient will return for worsening symptoms and is stable at the time of discharge. The patient verbalizes understanding and will comply.    FINAL IMPRESSION    1. SVT (supraventricular tachycardia) (HCC)    2. Syncope, unspecified syncope type    3. Palpitations               Electronically signed by: Marino Calvin M.D., 11/5/2021 4:45 PM

## 2021-11-05 NOTE — ED NOTES
Rounded with pt for revital. No changes in complaint at this time. Thanked for patience and apologized for wait time.     Triage rn aware.

## 2021-11-06 NOTE — DISCHARGE INSTRUCTIONS
Your labs today were reassuring, you have a small subchorionic hematoma which is actually very common in pregnancy and is likely unrelated to the fall today.  I would like you to follow-up closely with your OB doctors.  If you develop the SVT symptoms again please lie in the ground put your feet above your head and use the syringe, blow into a trying to push the plunger backwards as hard as you can.  Take the labetalol daily, we will cut the dose in half to 50 mg to see if this helps mitigate your episodes of SVT and also not make you feel unwell like the higher dose did.  You should receive a phone call from our cardiology service with an appointment with Dr. Briones.

## 2021-11-08 ENCOUNTER — TELEPHONE (OUTPATIENT)
Dept: CARDIOLOGY | Facility: MEDICAL CENTER | Age: 26
End: 2021-11-08

## 2021-11-08 NOTE — TELEPHONE ENCOUNTER
"Called pt and scheduled her with Dr. Briones 11/12.   She states she is doing okay since ER visit and has no concerns or questions currently.     She is scheduled for an echo 12/03    -----------------------------------------------------------------------------  ----- Message from Reynaldo Briones M.D. sent at 11/8/2021 10:16 AM PST -----  Regarding: FW: Follow-up \"SVT\"  Ok to get her in  ----- Message -----  From: Barrett Louis M.D.  Sent: 11/5/2021   4:51 PM PST  To: Iesha Bustos R.N., Reynaldo Briones M.D.  Subject: Follow-up \"SVT\"                                  Mitchell this patient came into the ER with recurrent \"SVT\" and passed out when she was trying to splash cold water on her face.  She is stable at the moment.  She had been on labetalol 100 but had to stop it due to bradycardia.However now she is 14 weeks pregnant.If she remains stable she will be discharged but thought you might want have her follow-up sooner rather than laterAny questions call        "

## 2021-11-12 ENCOUNTER — OFFICE VISIT (OUTPATIENT)
Dept: CARDIOLOGY | Facility: MEDICAL CENTER | Age: 26
End: 2021-11-12
Payer: MEDICAID

## 2021-11-12 VITALS
HEART RATE: 89 BPM | OXYGEN SATURATION: 96 % | HEIGHT: 60 IN | DIASTOLIC BLOOD PRESSURE: 68 MMHG | RESPIRATION RATE: 16 BRPM | BODY MASS INDEX: 29.84 KG/M2 | WEIGHT: 152 LBS | SYSTOLIC BLOOD PRESSURE: 108 MMHG

## 2021-11-12 DIAGNOSIS — R00.2 PALPITATIONS: ICD-10-CM

## 2021-11-12 DIAGNOSIS — Z3A.14 14 WEEKS GESTATION OF PREGNANCY: ICD-10-CM

## 2021-11-12 LAB — EKG IMPRESSION: NORMAL

## 2021-11-12 PROCEDURE — 99214 OFFICE O/P EST MOD 30 MIN: CPT | Performed by: INTERNAL MEDICINE

## 2021-11-12 PROCEDURE — 93000 ELECTROCARDIOGRAM COMPLETE: CPT | Performed by: INTERNAL MEDICINE

## 2021-11-12 ASSESSMENT — FIBROSIS 4 INDEX: FIB4 SCORE: .3888888888888888889

## 2021-11-12 NOTE — PROGRESS NOTES
Chief Complaint   Patient presents with   • Palpitations       Subjective     Marlys Jane is a 26 y.o. female who presents today with probable SVT although not yet documented on ECG.  Does have an iWatch which can record it.  Echocardiogram is pending. Biotel unremarkable but no episodes at that time. Recent SVT up to 220.  Syncope.  Resolution.  Possibly broken with vagal maneuvers.  Seen in ER.  Currently on labetalol per OB.  Previously on atenolol.  Tolerating 50 mg daily.  14 weeks pregnant.    Past Medical History:   Diagnosis Date   • Anxiety 2017   • Gall stones    • Kidney disease     kidney stones     Past Surgical History:   Procedure Laterality Date   • URETEROSCOPY Right 12/9/2018    Procedure: URETEROSCOPY LASER LITHO;  Surgeon: Reynaldo Bajwa M.D.;  Location: SURGERY [facility];  Service: Urology   • CYSTOSCOPY STENT PLACEMENT  12/9/2018    Procedure: CYSTOSCOPY;  Surgeon: Reynaldo Bajwa M.D.;  Location: SURGERY [facility];  Service: Urology   • CYSTOSCOPY STENT PLACEMENT N/A 5/17/2016    Procedure: CYSTOSCOPY STENT PLACEMENT;  Surgeon: Sherif Lees M.D.;  Location: SURGERY [facility];  Service:    • URETEROSCOPY Right 5/17/2016    Procedure: URETEROSCOPY;  Surgeon: Sherif Lees M.D.;  Location: SURGERY [facility];  Service:    • LASERTRIPSY Right 5/17/2016    Procedure: LASERTRIPSY LITHO;  Surgeon: Sherif Lees M.D.;  Location: SURGERY [facility];  Service:      Family History   Problem Relation Age of Onset   • No Known Problems Maternal Grandmother    • No Known Problems Maternal Grandfather    • No Known Problems Paternal Grandmother    • No Known Problems Paternal Grandfather      Social History     Socioeconomic History   • Marital status: Single     Spouse name: Not on file   • Number of children: Not on file   • Years of education: Not on file   • Highest education level: Not on file   Occupational History   • Not on file   Tobacco Use   • Smoking status:  Never Smoker   • Smokeless tobacco: Never Used   Vaping Use   • Vaping Use: Not on file   Substance and Sexual Activity   • Alcohol use: Not Currently     Comment: occasional   • Drug use: Yes     Types: Inhaled     Comment: marijuana last use a week ago    • Sexual activity: Yes     Partners: Male     Birth control/protection: Surgical, I.U.D.     Comment: Paragard on 8/29/2019   Other Topics Concern   • Not on file   Social History Narrative   • Not on file     Social Determinants of Health     Financial Resource Strain:    • Difficulty of Paying Living Expenses: Not on file   Food Insecurity:    • Worried About Running Out of Food in the Last Year: Not on file   • Ran Out of Food in the Last Year: Not on file   Transportation Needs:    • Lack of Transportation (Medical): Not on file   • Lack of Transportation (Non-Medical): Not on file   Physical Activity:    • Days of Exercise per Week: Not on file   • Minutes of Exercise per Session: Not on file   Stress:    • Feeling of Stress : Not on file   Social Connections:    • Frequency of Communication with Friends and Family: Not on file   • Frequency of Social Gatherings with Friends and Family: Not on file   • Attends Mandaeism Services: Not on file   • Active Member of Clubs or Organizations: Not on file   • Attends Club or Organization Meetings: Not on file   • Marital Status: Not on file   Intimate Partner Violence:    • Fear of Current or Ex-Partner: Not on file   • Emotionally Abused: Not on file   • Physically Abused: Not on file   • Sexually Abused: Not on file   Housing Stability:    • Unable to Pay for Housing in the Last Year: Not on file   • Number of Places Lived in the Last Year: Not on file   • Unstable Housing in the Last Year: Not on file     No Known Allergies  Outpatient Encounter Medications as of 11/12/2021   Medication Sig Dispense Refill   • Prenatal Vit-DSS-Fe Cbn-FA (PRENATAL AD PO) Take  by mouth.     • escitalopram (LEXAPRO) 5 MG tablet  Take 5 mg by mouth every day.     • albuterol 108 (90 Base) MCG/ACT Aero Soln inhalation aerosol Inhale 1-2 Puffs every four hours as needed for Shortness of Breath.     • hydrOXYzine pamoate (VISTARIL) 100 MG Cap Take 100 mg by mouth 1 time a day as needed for Anxiety.     • labetalol (NORMODYNE) 100 MG Tab Take 0.5 Tablets by mouth every day. (Patient not taking: Reported on 11/12/2021) 60 Tablet 0     No facility-administered encounter medications on file as of 11/12/2021.     ROS           Objective     /68 (BP Location: Left arm, Patient Position: Sitting, BP Cuff Size: Adult)   Pulse 89   Resp 16   Ht 1.524 m (5')   Wt 68.9 kg (152 lb)   SpO2 96%   BMI 29.69 kg/m²     Physical Exam  Constitutional:       Appearance: She is well-developed.   HENT:      Head: Normocephalic and atraumatic.   Eyes:      Pupils: Pupils are equal, round, and reactive to light.   Cardiovascular:      Rate and Rhythm: Normal rate and regular rhythm.      Heart sounds: Normal heart sounds. No murmur heard.  No friction rub. No gallop.    Pulmonary:      Effort: Pulmonary effort is normal.      Breath sounds: Normal breath sounds.   Abdominal:      General: Bowel sounds are normal.      Palpations: Abdomen is soft.   Musculoskeletal:         General: Normal range of motion.      Cervical back: Normal range of motion and neck supple.   Skin:     General: Skin is warm.   Neurological:      Mental Status: She is alert and oriented to person, place, and time.      Cranial Nerves: No cranial nerve deficit.   Psychiatric:         Behavior: Behavior normal.         Thought Content: Thought content normal.         Judgment: Judgment normal.                Assessment & Plan     1. Palpitations  EKG   2. 14 weeks gestation of pregnancy         Medical Decision Making: Today's Assessment/Status/Plan:   1.  Probable SVT.  Has an iWatch.  Pregnant.  Continue labetalol.  Possibly can increase to 50 twice daily if recurrences.  2.  Fifth  pregnancy.  Other pregnancies unremarkable but seeing high risk OB.  3.  Follow-up with me in 4 to 5 months.

## 2021-12-03 ENCOUNTER — HOSPITAL ENCOUNTER (OUTPATIENT)
Dept: CARDIOLOGY | Facility: MEDICAL CENTER | Age: 26
End: 2021-12-03
Attending: INTERNAL MEDICINE
Payer: MEDICAID

## 2021-12-03 DIAGNOSIS — I47.10 SVT (SUPRAVENTRICULAR TACHYCARDIA) (HCC): ICD-10-CM

## 2021-12-03 PROCEDURE — 93306 TTE W/DOPPLER COMPLETE: CPT

## 2021-12-07 LAB
LV EJECT FRACT  99904: 60
LV EJECT FRACT MOD 2C 99903: 54.83
LV EJECT FRACT MOD 4C 99902: 68.1
LV EJECT FRACT MOD BP 99901: 62.72

## 2021-12-07 PROCEDURE — 93306 TTE W/DOPPLER COMPLETE: CPT | Mod: 26 | Performed by: INTERNAL MEDICINE

## 2022-02-26 ENCOUNTER — APPOINTMENT (OUTPATIENT)
Dept: RADIOLOGY | Facility: MEDICAL CENTER | Age: 27
End: 2022-02-26
Attending: OBSTETRICS & GYNECOLOGY
Payer: MEDICAID

## 2022-02-26 ENCOUNTER — HOSPITAL ENCOUNTER (EMERGENCY)
Facility: MEDICAL CENTER | Age: 27
End: 2022-02-27
Attending: SPECIALIST | Admitting: SPECIALIST
Payer: MEDICAID

## 2022-02-26 VITALS
RESPIRATION RATE: 18 BRPM | TEMPERATURE: 97.9 F | BODY MASS INDEX: 33.38 KG/M2 | WEIGHT: 170 LBS | DIASTOLIC BLOOD PRESSURE: 55 MMHG | HEART RATE: 87 BPM | HEIGHT: 60 IN | OXYGEN SATURATION: 96 % | SYSTOLIC BLOOD PRESSURE: 117 MMHG

## 2022-02-26 LAB
APPEARANCE UR: CLEAR
BACTERIA GENITAL QL WET PREP: NORMAL
COLOR UR AUTO: YELLOW
FIBRONECTIN FETAL SPEC QL: NEGATIVE
GLUCOSE UR QL STRIP.AUTO: NEGATIVE MG/DL
KETONES UR QL STRIP.AUTO: NEGATIVE MG/DL
LEUKOCYTE ESTERASE UR QL STRIP.AUTO: NEGATIVE
NITRITE UR QL STRIP.AUTO: NEGATIVE
PH UR STRIP.AUTO: 7 [PH] (ref 5–8)
PROT UR QL STRIP: NEGATIVE MG/DL
RBC UR QL AUTO: NEGATIVE
SIGNIFICANT IND 70042: NORMAL
SITE SITE: NORMAL
SOURCE SOURCE: NORMAL
SP GR UR STRIP.AUTO: 1.02 (ref 1–1.03)

## 2022-02-26 PROCEDURE — 99284 EMERGENCY DEPT VISIT MOD MDM: CPT

## 2022-02-26 PROCEDURE — 81002 URINALYSIS NONAUTO W/O SCOPE: CPT

## 2022-02-26 PROCEDURE — 82731 ASSAY OF FETAL FIBRONECTIN: CPT

## 2022-02-26 PROCEDURE — 76817 TRANSVAGINAL US OBSTETRIC: CPT

## 2022-02-26 ASSESSMENT — PAIN SCALES - GENERAL: PAINLEVEL: 0 - NO PAIN

## 2022-02-26 ASSESSMENT — FIBROSIS 4 INDEX: FIB4 SCORE: .3888888888888888889

## 2022-02-27 PROCEDURE — 59025 FETAL NON-STRESS TEST: CPT

## 2022-02-27 NOTE — ED PROVIDER NOTES
S: Pt here for spotting x 2. No further bleeding. Pt is feeling some tightening but irregularly and not too painful. She is seeing [facility]. She did have a h/o of a 32 week PTD. She them went into PTL w/ her following 2 delvieries but they were able to stop it and she delviered FT.     O:   Vitals:    22 2049   BP: 117/55   Pulse: 87   Resp: 18   Temp: 36.6 °C (97.9 °F)   SpO2: 96%     GEN: NAD  Abd: soft  Pelvic exam: SSE per nurse, no pooling, no blood, cervix visually closed. SVE: deferred    FFN: neg  CL 3.4 cm, no funneling, no debris    A/P: 25 yo  @ 29w6d here for spotting, no signs/symptoms of active PTL. Neg FFN +CL is reassuring  -Will d/c to home now  -Strict PTL precautions reviewed  -Pt to see [facility] this week for evaluation  -Pt states understanding and agreement with plan.

## 2022-02-27 NOTE — DISCHARGE INSTRUCTIONS
Pre-term Labor (<37 weeks):  Call your physician or return to the hospital if:  · You have painless regular contractions more than 4 in one hour.  · Your water breaks (remember time and color).  · You have menstrual-like cramps, a low dull backache or pressure in your pelvis or back.  · Your baby does not move enough to complete the daily kick count (10 movements in 2 hours).  · Your baby moves much less often than on the days before or you have not felt your baby move all day.  · Please review the MEDICATION LIST section of your AFTER VISIT SUMMARY document.  · Take your medication as prescribed  Follow up as scheduled with [facility]

## 2022-03-04 ENCOUNTER — TELEPHONE (OUTPATIENT)
Dept: CARDIOLOGY | Facility: MEDICAL CENTER | Age: 27
End: 2022-03-04
Payer: MEDICAID

## 2022-04-21 ENCOUNTER — HOSPITAL ENCOUNTER (EMERGENCY)
Facility: MEDICAL CENTER | Age: 27
End: 2022-04-21
Attending: SPECIALIST | Admitting: SPECIALIST
Payer: MEDICAID

## 2022-04-21 VITALS
TEMPERATURE: 98 F | RESPIRATION RATE: 18 BRPM | HEART RATE: 88 BPM | BODY MASS INDEX: 36.12 KG/M2 | OXYGEN SATURATION: 98 % | HEIGHT: 60 IN | SYSTOLIC BLOOD PRESSURE: 117 MMHG | WEIGHT: 184 LBS | DIASTOLIC BLOOD PRESSURE: 59 MMHG

## 2022-04-21 PROCEDURE — 302449 STATCHG TRIAGE ONLY (STATISTIC)

## 2022-04-21 PROCEDURE — 59025 FETAL NON-STRESS TEST: CPT

## 2022-04-21 ASSESSMENT — FIBROSIS 4 INDEX: FIB4 SCORE: .3888888888888888889

## 2022-05-02 ENCOUNTER — ANESTHESIA (OUTPATIENT)
Dept: ANESTHESIOLOGY | Facility: MEDICAL CENTER | Age: 27
End: 2022-05-02
Payer: MEDICAID

## 2022-05-02 ENCOUNTER — APPOINTMENT (OUTPATIENT)
Dept: OBGYN | Facility: MEDICAL CENTER | Age: 27
End: 2022-05-02
Attending: SPECIALIST
Payer: MEDICAID

## 2022-05-02 ENCOUNTER — ANESTHESIA EVENT (OUTPATIENT)
Dept: ANESTHESIOLOGY | Facility: MEDICAL CENTER | Age: 27
End: 2022-05-02
Payer: MEDICAID

## 2022-05-02 ENCOUNTER — HOSPITAL ENCOUNTER (INPATIENT)
Facility: MEDICAL CENTER | Age: 27
LOS: 2 days | End: 2022-05-04
Attending: SPECIALIST | Admitting: SPECIALIST
Payer: MEDICAID

## 2022-05-02 DIAGNOSIS — D62 ACUTE POSTHEMORRHAGIC ANEMIA: ICD-10-CM

## 2022-05-02 DIAGNOSIS — K59.01 CONSTIPATION BY DELAYED COLONIC TRANSIT: ICD-10-CM

## 2022-05-02 DIAGNOSIS — R10.2 PELVIC PAIN: ICD-10-CM

## 2022-05-02 LAB
ABO GROUP BLD: NORMAL
BASOPHILS # BLD AUTO: 0.2 % (ref 0–1.8)
BASOPHILS # BLD: 0.02 K/UL (ref 0–0.12)
BLD GP AB SCN SERPL QL: NORMAL
EOSINOPHIL # BLD AUTO: 0.07 K/UL (ref 0–0.51)
EOSINOPHIL NFR BLD: 0.7 % (ref 0–6.9)
ERYTHROCYTE [DISTWIDTH] IN BLOOD BY AUTOMATED COUNT: 49 FL (ref 35.9–50)
HCT VFR BLD AUTO: 33.4 % (ref 37–47)
HGB BLD-MCNC: 10.9 G/DL (ref 12–16)
IMM GRANULOCYTES # BLD AUTO: 0.09 K/UL (ref 0–0.11)
IMM GRANULOCYTES NFR BLD AUTO: 0.9 % (ref 0–0.9)
LYMPHOCYTES # BLD AUTO: 2.86 K/UL (ref 1–4.8)
LYMPHOCYTES NFR BLD: 27.1 % (ref 22–41)
MCH RBC QN AUTO: 30.9 PG (ref 27–33)
MCHC RBC AUTO-ENTMCNC: 32.6 G/DL (ref 33.6–35)
MCV RBC AUTO: 94.6 FL (ref 81.4–97.8)
MONOCYTES # BLD AUTO: 0.8 K/UL (ref 0–0.85)
MONOCYTES NFR BLD AUTO: 7.6 % (ref 0–13.4)
NEUTROPHILS # BLD AUTO: 6.73 K/UL (ref 2–7.15)
NEUTROPHILS NFR BLD: 63.5 % (ref 44–72)
NRBC # BLD AUTO: 0 K/UL
NRBC BLD-RTO: 0 /100 WBC
PLATELET # BLD AUTO: 255 K/UL (ref 164–446)
PMV BLD AUTO: 9.3 FL (ref 9–12.9)
RBC # BLD AUTO: 3.53 M/UL (ref 4.2–5.4)
RH BLD: NORMAL
WBC # BLD AUTO: 10.6 K/UL (ref 4.8–10.8)

## 2022-05-02 PROCEDURE — 700111 HCHG RX REV CODE 636 W/ 250 OVERRIDE (IP)

## 2022-05-02 PROCEDURE — 10907ZC DRAINAGE OF AMNIOTIC FLUID, THERAPEUTIC FROM PRODUCTS OF CONCEPTION, VIA NATURAL OR ARTIFICIAL OPENING: ICD-10-PCS | Performed by: SPECIALIST

## 2022-05-02 PROCEDURE — 700105 HCHG RX REV CODE 258: Performed by: SPECIALIST

## 2022-05-02 PROCEDURE — 36415 COLL VENOUS BLD VENIPUNCTURE: CPT

## 2022-05-02 PROCEDURE — 10H073Z INSERTION OF MONITORING ELECTRODE INTO PRODUCTS OF CONCEPTION, VIA NATURAL OR ARTIFICIAL OPENING: ICD-10-PCS | Performed by: SPECIALIST

## 2022-05-02 PROCEDURE — 4A1H74Z MONITORING OF PRODUCTS OF CONCEPTION, CARDIAC ELECTRICAL ACTIVITY, VIA NATURAL OR ARTIFICIAL OPENING: ICD-10-PCS | Performed by: SPECIALIST

## 2022-05-02 PROCEDURE — 770002 HCHG ROOM/CARE - OB PRIVATE (112)

## 2022-05-02 PROCEDURE — 700111 HCHG RX REV CODE 636 W/ 250 OVERRIDE (IP): Performed by: ANESTHESIOLOGY

## 2022-05-02 PROCEDURE — 85025 COMPLETE CBC W/AUTO DIFF WBC: CPT

## 2022-05-02 PROCEDURE — 303615 HCHG EPIDURAL/SPINAL ANESTHESIA FOR LABOR

## 2022-05-02 PROCEDURE — 700105 HCHG RX REV CODE 258: Performed by: ANESTHESIOLOGY

## 2022-05-02 PROCEDURE — 700101 HCHG RX REV CODE 250: Performed by: ANESTHESIOLOGY

## 2022-05-02 PROCEDURE — 700111 HCHG RX REV CODE 636 W/ 250 OVERRIDE (IP): Performed by: SPECIALIST

## 2022-05-02 PROCEDURE — 59409 OBSTETRICAL CARE: CPT

## 2022-05-02 PROCEDURE — 86900 BLOOD TYPING SEROLOGIC ABO: CPT

## 2022-05-02 PROCEDURE — 304965 HCHG RECOVERY SERVICES

## 2022-05-02 PROCEDURE — 86901 BLOOD TYPING SEROLOGIC RH(D): CPT

## 2022-05-02 PROCEDURE — 10H07YZ INSERTION OF OTHER DEVICE INTO PRODUCTS OF CONCEPTION, VIA NATURAL OR ARTIFICIAL OPENING: ICD-10-PCS | Performed by: SPECIALIST

## 2022-05-02 PROCEDURE — 3E033VJ INTRODUCTION OF OTHER HORMONE INTO PERIPHERAL VEIN, PERCUTANEOUS APPROACH: ICD-10-PCS | Performed by: SPECIALIST

## 2022-05-02 PROCEDURE — 01967 NEURAXL LBR ANES VAG DLVR: CPT | Performed by: ANESTHESIOLOGY

## 2022-05-02 PROCEDURE — 86850 RBC ANTIBODY SCREEN: CPT

## 2022-05-02 PROCEDURE — A9270 NON-COVERED ITEM OR SERVICE: HCPCS | Performed by: SPECIALIST

## 2022-05-02 PROCEDURE — 700102 HCHG RX REV CODE 250 W/ 637 OVERRIDE(OP): Performed by: SPECIALIST

## 2022-05-02 RX ORDER — IBUPROFEN 800 MG/1
800 TABLET ORAL
Status: COMPLETED | OUTPATIENT
Start: 2022-05-02 | End: 2022-05-02

## 2022-05-02 RX ORDER — HYDROXYZINE HYDROCHLORIDE 25 MG/1
100 TABLET, FILM COATED ORAL EVERY 6 HOURS PRN
Status: DISCONTINUED | OUTPATIENT
Start: 2022-05-02 | End: 2022-05-04 | Stop reason: HOSPADM

## 2022-05-02 RX ORDER — SODIUM CHLORIDE, SODIUM LACTATE, POTASSIUM CHLORIDE, AND CALCIUM CHLORIDE .6; .31; .03; .02 G/100ML; G/100ML; G/100ML; G/100ML
1000 INJECTION, SOLUTION INTRAVENOUS
Status: DISCONTINUED | OUTPATIENT
Start: 2022-05-02 | End: 2022-05-02 | Stop reason: HOSPADM

## 2022-05-02 RX ORDER — ACETAMINOPHEN 500 MG
1000 TABLET ORAL
Status: DISCONTINUED | OUTPATIENT
Start: 2022-05-02 | End: 2022-05-02 | Stop reason: HOSPADM

## 2022-05-02 RX ORDER — SODIUM CHLORIDE, SODIUM LACTATE, POTASSIUM CHLORIDE, AND CALCIUM CHLORIDE .6; .31; .03; .02 G/100ML; G/100ML; G/100ML; G/100ML
250 INJECTION, SOLUTION INTRAVENOUS PRN
Status: DISCONTINUED | OUTPATIENT
Start: 2022-05-02 | End: 2022-05-02 | Stop reason: HOSPADM

## 2022-05-02 RX ORDER — BUPIVACAINE HYDROCHLORIDE 2.5 MG/ML
INJECTION, SOLUTION EPIDURAL; INFILTRATION; INTRACAUDAL
Status: COMPLETED
Start: 2022-05-02 | End: 2022-05-02

## 2022-05-02 RX ORDER — ROPIVACAINE HYDROCHLORIDE 2 MG/ML
INJECTION, SOLUTION EPIDURAL; INFILTRATION; PERINEURAL
Status: COMPLETED
Start: 2022-05-02 | End: 2022-05-02

## 2022-05-02 RX ORDER — IBUPROFEN 800 MG/1
800 TABLET ORAL EVERY 8 HOURS PRN
Status: DISCONTINUED | OUTPATIENT
Start: 2022-05-02 | End: 2022-05-04 | Stop reason: HOSPADM

## 2022-05-02 RX ORDER — TERBUTALINE SULFATE 1 MG/ML
0.25 INJECTION, SOLUTION SUBCUTANEOUS
Status: DISCONTINUED | OUTPATIENT
Start: 2022-05-02 | End: 2022-05-02 | Stop reason: HOSPADM

## 2022-05-02 RX ORDER — ACETAMINOPHEN 500 MG
1000 TABLET ORAL EVERY 6 HOURS PRN
Status: DISCONTINUED | OUTPATIENT
Start: 2022-05-02 | End: 2022-05-04 | Stop reason: HOSPADM

## 2022-05-02 RX ORDER — ROPIVACAINE HYDROCHLORIDE 2 MG/ML
INJECTION, SOLUTION EPIDURAL; INFILTRATION; PERINEURAL CONTINUOUS
Status: DISCONTINUED | OUTPATIENT
Start: 2022-05-02 | End: 2022-05-04 | Stop reason: HOSPADM

## 2022-05-02 RX ORDER — LIDOCAINE HYDROCHLORIDE AND EPINEPHRINE 15; 5 MG/ML; UG/ML
INJECTION, SOLUTION EPIDURAL
Status: COMPLETED | OUTPATIENT
Start: 2022-05-02 | End: 2022-05-02

## 2022-05-02 RX ORDER — DOCUSATE SODIUM 100 MG/1
100 CAPSULE, LIQUID FILLED ORAL 2 TIMES DAILY PRN
Status: DISCONTINUED | OUTPATIENT
Start: 2022-05-02 | End: 2022-05-04 | Stop reason: HOSPADM

## 2022-05-02 RX ORDER — SODIUM CHLORIDE, SODIUM LACTATE, POTASSIUM CHLORIDE, CALCIUM CHLORIDE 600; 310; 30; 20 MG/100ML; MG/100ML; MG/100ML; MG/100ML
INJECTION, SOLUTION INTRAVENOUS PRN
Status: DISCONTINUED | OUTPATIENT
Start: 2022-05-02 | End: 2022-05-04 | Stop reason: HOSPADM

## 2022-05-02 RX ORDER — OXYCODONE HYDROCHLORIDE 5 MG/1
5 TABLET ORAL EVERY 4 HOURS PRN
Status: DISCONTINUED | OUTPATIENT
Start: 2022-05-02 | End: 2022-05-04 | Stop reason: HOSPADM

## 2022-05-02 RX ORDER — VITAMIN A ACETATE, BETA CAROTENE, ASCORBIC ACID, CHOLECALCIFEROL, .ALPHA.-TOCOPHEROL ACETATE, DL-, THIAMINE MONONITRATE, RIBOFLAVIN, NIACINAMIDE, PYRIDOXINE HYDROCHLORIDE, FOLIC ACID, CYANOCOBALAMIN, CALCIUM CARBONATE, FERROUS FUMARATE, ZINC OXIDE, CUPRIC OXIDE 3080; 12; 120; 400; 1; 1.84; 3; 20; 22; 920; 25; 200; 27; 10; 2 [IU]/1; UG/1; MG/1; [IU]/1; MG/1; MG/1; MG/1; MG/1; MG/1; [IU]/1; MG/1; MG/1; MG/1; MG/1; MG/1
1 TABLET, FILM COATED ORAL
Status: DISCONTINUED | OUTPATIENT
Start: 2022-05-03 | End: 2022-05-04 | Stop reason: HOSPADM

## 2022-05-02 RX ORDER — MISOPROSTOL 200 UG/1
1000 TABLET ORAL ONCE
Status: COMPLETED | OUTPATIENT
Start: 2022-05-02 | End: 2022-05-02

## 2022-05-02 RX ORDER — METHYLERGONOVINE MALEATE 0.2 MG/ML
0.2 INJECTION INTRAVENOUS
Status: DISCONTINUED | OUTPATIENT
Start: 2022-05-02 | End: 2022-05-04 | Stop reason: HOSPADM

## 2022-05-02 RX ORDER — CLONAZEPAM 1 MG/1
1 TABLET ORAL 2 TIMES DAILY
COMMUNITY

## 2022-05-02 RX ORDER — CARBOPROST TROMETHAMINE 250 UG/ML
250 INJECTION, SOLUTION INTRAMUSCULAR
Status: DISCONTINUED | OUTPATIENT
Start: 2022-05-02 | End: 2022-05-04 | Stop reason: HOSPADM

## 2022-05-02 RX ORDER — ONDANSETRON 2 MG/ML
4 INJECTION INTRAMUSCULAR; INTRAVENOUS EVERY 4 HOURS PRN
Status: DISCONTINUED | OUTPATIENT
Start: 2022-05-02 | End: 2022-05-04 | Stop reason: HOSPADM

## 2022-05-02 RX ORDER — ROPIVACAINE HYDROCHLORIDE 2 MG/ML
INJECTION, SOLUTION EPIDURAL; INFILTRATION; PERINEURAL CONTINUOUS
Status: DISCONTINUED | OUTPATIENT
Start: 2022-05-02 | End: 2022-05-02

## 2022-05-02 RX ORDER — SODIUM CHLORIDE, SODIUM LACTATE, POTASSIUM CHLORIDE, AND CALCIUM CHLORIDE .6; .31; .03; .02 G/100ML; G/100ML; G/100ML; G/100ML
1000 INJECTION, SOLUTION INTRAVENOUS ONCE
Status: COMPLETED | OUTPATIENT
Start: 2022-05-02 | End: 2022-05-02

## 2022-05-02 RX ORDER — MISOPROSTOL 200 UG/1
600 TABLET ORAL
Status: DISCONTINUED | OUTPATIENT
Start: 2022-05-02 | End: 2022-05-04 | Stop reason: HOSPADM

## 2022-05-02 RX ORDER — BUPIVACAINE HYDROCHLORIDE 2.5 MG/ML
INJECTION, SOLUTION EPIDURAL; INFILTRATION; INTRACAUDAL
Status: COMPLETED | OUTPATIENT
Start: 2022-05-02 | End: 2022-05-02

## 2022-05-02 RX ORDER — ACETAMINOPHEN 500 MG
1000 TABLET ORAL ONCE
Status: COMPLETED | OUTPATIENT
Start: 2022-05-02 | End: 2022-05-02

## 2022-05-02 RX ORDER — SODIUM CHLORIDE, SODIUM LACTATE, POTASSIUM CHLORIDE, CALCIUM CHLORIDE 600; 310; 30; 20 MG/100ML; MG/100ML; MG/100ML; MG/100ML
INJECTION, SOLUTION INTRAVENOUS CONTINUOUS
Status: DISCONTINUED | OUTPATIENT
Start: 2022-05-02 | End: 2022-05-04 | Stop reason: HOSPADM

## 2022-05-02 RX ORDER — OXYTOCIN 10 [USP'U]/ML
10 INJECTION, SOLUTION INTRAMUSCULAR; INTRAVENOUS
Status: DISCONTINUED | OUTPATIENT
Start: 2022-05-02 | End: 2022-05-02 | Stop reason: HOSPADM

## 2022-05-02 RX ADMIN — ROPIVACAINE HYDROCHLORIDE: 2 INJECTION, SOLUTION EPIDURAL; INFILTRATION at 15:22

## 2022-05-02 RX ADMIN — ROPIVACAINE HYDROCHLORIDE 200 MG: 2 INJECTION, SOLUTION EPIDURAL; INFILTRATION at 06:45

## 2022-05-02 RX ADMIN — SODIUM CHLORIDE, POTASSIUM CHLORIDE, SODIUM LACTATE AND CALCIUM CHLORIDE: 600; 310; 30; 20 INJECTION, SOLUTION INTRAVENOUS at 06:53

## 2022-05-02 RX ADMIN — SODIUM CHLORIDE, POTASSIUM CHLORIDE, SODIUM LACTATE AND CALCIUM CHLORIDE 1000 ML: 600; 310; 30; 20 INJECTION, SOLUTION INTRAVENOUS at 18:32

## 2022-05-02 RX ADMIN — ONDANSETRON 4 MG: 2 INJECTION INTRAMUSCULAR; INTRAVENOUS at 10:56

## 2022-05-02 RX ADMIN — SODIUM CHLORIDE, POTASSIUM CHLORIDE, SODIUM LACTATE AND CALCIUM CHLORIDE 1000 ML: 600; 310; 30; 20 INJECTION, SOLUTION INTRAVENOUS at 10:56

## 2022-05-02 RX ADMIN — MISOPROSTOL 1000 MCG: 200 TABLET ORAL at 21:29

## 2022-05-02 RX ADMIN — HYDROXYZINE HYDROCHLORIDE 100 MG: 50 TABLET, FILM COATED ORAL at 08:16

## 2022-05-02 RX ADMIN — ACETAMINOPHEN 1000 MG: 500 TABLET ORAL at 20:31

## 2022-05-02 RX ADMIN — OXYTOCIN 125 ML/HR: 10 INJECTION, SOLUTION INTRAMUSCULAR; INTRAVENOUS at 22:19

## 2022-05-02 RX ADMIN — SODIUM CHLORIDE, POTASSIUM CHLORIDE, SODIUM LACTATE AND CALCIUM CHLORIDE 1000 ML: 600; 310; 30; 20 INJECTION, SOLUTION INTRAVENOUS at 09:30

## 2022-05-02 RX ADMIN — SODIUM CHLORIDE, POTASSIUM CHLORIDE, SODIUM LACTATE AND CALCIUM CHLORIDE 1000 ML: 600; 310; 30; 20 INJECTION, SOLUTION INTRAVENOUS at 06:00

## 2022-05-02 RX ADMIN — LIDOCAINE HYDROCHLORIDE,EPINEPHRINE BITARTRATE 5 ML: 15; .005 INJECTION, SOLUTION EPIDURAL; INFILTRATION; INTRACAUDAL; PERINEURAL at 06:35

## 2022-05-02 RX ADMIN — OXYTOCIN 2 MILLI-UNITS/MIN: 10 INJECTION, SOLUTION INTRAMUSCULAR; INTRAVENOUS at 06:53

## 2022-05-02 RX ADMIN — IBUPROFEN 800 MG: 800 TABLET, FILM COATED ORAL at 22:22

## 2022-05-02 RX ADMIN — BUPIVACAINE HYDROCHLORIDE 7 ML: 2.5 INJECTION, SOLUTION EPIDURAL; INFILTRATION; INTRACAUDAL; PERINEURAL at 06:35

## 2022-05-02 ASSESSMENT — LIFESTYLE VARIABLES
ON A TYPICAL DAY WHEN YOU DRINK ALCOHOL HOW MANY DRINKS DO YOU HAVE: 0
AVERAGE NUMBER OF DAYS PER WEEK YOU HAVE A DRINK CONTAINING ALCOHOL: 0
EVER HAD A DRINK FIRST THING IN THE MORNING TO STEADY YOUR NERVES TO GET RID OF A HANGOVER: NO
CONSUMPTION TOTAL: NEGATIVE
TOTAL SCORE: 0
ALCOHOL_USE: NO
TOTAL SCORE: 0
HAVE PEOPLE ANNOYED YOU BY CRITICIZING YOUR DRINKING: NO
EVER_SMOKED: NEVER
HOW MANY TIMES IN THE PAST YEAR HAVE YOU HAD 5 OR MORE DRINKS IN A DAY: 0
EVER FELT BAD OR GUILTY ABOUT YOUR DRINKING: NO
TOTAL SCORE: 0
HAVE YOU EVER FELT YOU SHOULD CUT DOWN ON YOUR DRINKING: NO

## 2022-05-02 ASSESSMENT — PAIN DESCRIPTION - PAIN TYPE
TYPE: ACUTE PAIN

## 2022-05-02 ASSESSMENT — FIBROSIS 4 INDEX: FIB4 SCORE: .3888888888888888889

## 2022-05-02 ASSESSMENT — PAIN SCALES - GENERAL: PAIN_LEVEL: 1

## 2022-05-02 NOTE — CARE PLAN
The patient is Watcher - Medium risk of patient condition declining or worsening    Shift Goals  Clinical Goals: Healthy mom and healthy baby  Patient Goals: Pain management  Family Goals: Support    Problem: Risk for Infection and Impaired Wound Healing  Goal: Patient will remain free from infection  Outcome: Progressing     Problem: Pain  Goal: Patient's pain will be alleviated or reduced to the patient’s comfort goal  Outcome: Progressing

## 2022-05-02 NOTE — ANESTHESIA PROCEDURE NOTES
Epidural Block    Date/Time: 5/2/2022 6:35 AM  Performed by: Kenney Duque M.D.  Authorized by: Kenney Duque M.D.     Patient Location:  OB  Start Time:  5/2/2022 6:35 AM  Reason for Block: labor analgesia    patient identified, IV checked, site marked, risks and benefits discussed, surgical consent, monitors and equipment checked, pre-op evaluation and timeout performed    Patient Position:  Sitting  Prep: ChloraPrep, patient draped and sterile technique    Monitoring:  Blood pressure, continuous pulse oximetry and heart rate  Approach:  Midline  Location:  L3-L4  Injection Technique:  NELSON saline  Skin infiltration:  Lidocaine  Strength:  1%  Dose:  3ml  Needle Type:  Tuohy  Needle Gauge:  17 G  Needle Length:  3.5 in  Loss of resistance::  6  Catheter Size:  19 G  Catheter at Skin Depth:  12  Test Dose Result:  Negative

## 2022-05-02 NOTE — ANESTHESIA PREPROCEDURE EVALUATION
Date: 22  Procedure: Labor Epidural       , IOL, h/o SVT earlier in pregnancy.     Relevant Problems   No relevant active problems       Physical Exam    Airway   Mallampati: II  TM distance: >3 FB  Neck ROM: full       Cardiovascular - normal exam  Rhythm: regular  Rate: normal  (-) murmur     Dental - normal exam           Pulmonary - normal exam  Breath sounds clear to auscultation     Abdominal    Neurological - normal exam                 Anesthesia Plan    ASA 2       Plan - epidural   Neuraxial block will be labor analgesia                  Pertinent diagnostic labs and testing reviewed    Informed Consent:    Anesthetic plan and risks discussed with patient.        
Depression    Diabetes mellitus, type 2

## 2022-05-02 NOTE — PROGRESS NOTES
Marlys received her labor epidural and her labor epidural appears to be functioning well.  She is on Pitocin.  She appears to be having contractions with a frequency of about once every 3 to 4 minutes.  I just checked her cervix and found her cervix to be about 3 cm dilated and 60 to 70% effaced and -3 station.  We will continue Pitocin and we will continue electronic fetal monitoring and anticipate an obstetrical delivery.  Gonzalo Negrete MD

## 2022-05-02 NOTE — PROGRESS NOTES
, due , 39w0d    0413 Pt presents to L&D for scheduled IOL. Pt reports mild and irregular contractions, no LOF or vaginal bleeding and +FM. EFM & TOCO applied. SVE as charted.     0455 Call to Dr. Negrete, orders received.    0530 IV started, labs drawn. Admission profile complete.     0546 Pt requesting epidural. Call to Dr. Negrete, OK with epidural placement.    0635 Epidural placed by Dr. Duque. Pt tolerated well.     0700 Report given to YOLANDA Blair.

## 2022-05-02 NOTE — PROGRESS NOTES
Marlys is a very pleasant 26-year old multipara (para 4 with 4 previous vaginal deliveries) who is today 39 weeks and 0 days gestation.  She has had her prenatal care with myself during the course of this pregnancy.  She has also been seen during the course of this pregnancy by perinatology, namely [facility].  She is admitted to Labor and Delivery this morning for elective induction of labor at 39 weeks gestation.  Cervical exam this morning reveals that the cervix is approximately 2 cm dilated and 70% effaced and -2 station.  The estimated fetal weight is 3400 grams.  She requested a labor epidural and so is now receiving a labor epidural.  We will start Pitocin after she has received her labor epidural.  Of note her recent vaginal culture for group B strep came back negative for group B strep.  Also she has experienced supraventricular tachycardia during this pregnancy and has seen cardiology and was prescribed labetalol.  She tells me recently however that she does not regularly take her labetalol.  After she has received her labor epidural we will start Pitocin.  We will continue electronic fetal monitoring.  We will anticipate an obstetrical delivery.  Gonzalo Negrete MD

## 2022-05-02 NOTE — PROGRESS NOTES
0700 - Report received from YOLANDA Edgar, care assumed. Cook Gestation today at 39 Weeks, admission for IOL.     Use of FM/[place] discussed in place, discussed POC ongoing as needed. Pleasant affect/mood. Review of labor process/expectations, breathing/relaxation techniques TBD as needed.  Patient just received an epidural. Reports mild dizziness, position changed and fluid bolus given.     Patient/FOB deny questions/concerns regarding care since arrival to [facility].   Patient encouraged to call RN with all questions/concerns/needs.    1800 - AROM by Dr. Negrete. Clear fluid. SVE =  4/70/-2    1900 - Report given to YOLANDA Hammer.

## 2022-05-03 ENCOUNTER — PHARMACY VISIT (OUTPATIENT)
Dept: PHARMACY | Facility: MEDICAL CENTER | Age: 27
End: 2022-05-03
Payer: COMMERCIAL

## 2022-05-03 LAB
ERYTHROCYTE [DISTWIDTH] IN BLOOD BY AUTOMATED COUNT: 48.8 FL (ref 35.9–50)
HCT VFR BLD AUTO: 28.7 % (ref 37–47)
HGB BLD-MCNC: 9.3 G/DL (ref 12–16)
MCH RBC QN AUTO: 30.9 PG (ref 27–33)
MCHC RBC AUTO-ENTMCNC: 32.4 G/DL (ref 33.6–35)
MCV RBC AUTO: 95.3 FL (ref 81.4–97.8)
PLATELET # BLD AUTO: 209 K/UL (ref 164–446)
PMV BLD AUTO: 9.3 FL (ref 9–12.9)
RBC # BLD AUTO: 3.01 M/UL (ref 4.2–5.4)
WBC # BLD AUTO: 15.9 K/UL (ref 4.8–10.8)

## 2022-05-03 PROCEDURE — A9270 NON-COVERED ITEM OR SERVICE: HCPCS | Performed by: SPECIALIST

## 2022-05-03 PROCEDURE — 85027 COMPLETE CBC AUTOMATED: CPT

## 2022-05-03 PROCEDURE — 700102 HCHG RX REV CODE 250 W/ 637 OVERRIDE(OP): Performed by: SPECIALIST

## 2022-05-03 PROCEDURE — 36415 COLL VENOUS BLD VENIPUNCTURE: CPT

## 2022-05-03 PROCEDURE — 3E0134Z INTRODUCTION OF SERUM, TOXOID AND VACCINE INTO SUBCUTANEOUS TISSUE, PERCUTANEOUS APPROACH: ICD-10-PCS | Performed by: SPECIALIST

## 2022-05-03 PROCEDURE — 770002 HCHG ROOM/CARE - OB PRIVATE (112)

## 2022-05-03 PROCEDURE — RXMED WILLOW AMBULATORY MEDICATION CHARGE: Performed by: SPECIALIST

## 2022-05-03 PROCEDURE — 700111 HCHG RX REV CODE 636 W/ 250 OVERRIDE (IP): Performed by: SPECIALIST

## 2022-05-03 PROCEDURE — 90471 IMMUNIZATION ADMIN: CPT

## 2022-05-03 PROCEDURE — 90707 MMR VACCINE SC: CPT | Performed by: SPECIALIST

## 2022-05-03 RX ORDER — FERROUS SULFATE 325(65) MG
325 TABLET ORAL DAILY
Qty: 30 TABLET | Refills: 0 | Status: SHIPPED | OUTPATIENT
Start: 2022-05-03

## 2022-05-03 RX ORDER — DOCUSATE SODIUM 100 MG/1
100 CAPSULE, LIQUID FILLED ORAL 2 TIMES DAILY
Qty: 60 CAPSULE | Refills: 0 | Status: SHIPPED | OUTPATIENT
Start: 2022-05-03

## 2022-05-03 RX ORDER — IBUPROFEN 800 MG/1
800 TABLET ORAL EVERY 8 HOURS PRN
Qty: 30 TABLET | Refills: 0 | Status: SHIPPED | OUTPATIENT
Start: 2022-05-03

## 2022-05-03 RX ORDER — ESCITALOPRAM OXALATE 10 MG/1
10 TABLET ORAL DAILY
Status: DISCONTINUED | OUTPATIENT
Start: 2022-05-03 | End: 2022-05-04 | Stop reason: HOSPADM

## 2022-05-03 RX ORDER — OXYCODONE HYDROCHLORIDE AND ACETAMINOPHEN 5; 325 MG/1; MG/1
1 TABLET ORAL EVERY 6 HOURS PRN
Qty: 28 TABLET | Refills: 0 | Status: SHIPPED | OUTPATIENT
Start: 2022-05-03 | End: 2022-05-10

## 2022-05-03 RX ADMIN — ESCITALOPRAM OXALATE 10 MG: 10 TABLET ORAL at 10:55

## 2022-05-03 RX ADMIN — MEASLES, MUMPS, AND RUBELLA VIRUS VACCINE LIVE 0.5 ML: 1000; 12500; 1000 INJECTION, POWDER, LYOPHILIZED, FOR SUSPENSION SUBCUTANEOUS at 09:16

## 2022-05-03 RX ADMIN — ACETAMINOPHEN 1000 MG: 500 TABLET ORAL at 20:29

## 2022-05-03 RX ADMIN — ACETAMINOPHEN 1000 MG: 500 TABLET ORAL at 02:29

## 2022-05-03 RX ADMIN — PRENATAL WITH FERROUS FUM AND FOLIC ACID 1 TABLET: 3080; 920; 120; 400; 22; 1.84; 3; 20; 10; 1; 12; 200; 27; 25; 2 TABLET ORAL at 07:35

## 2022-05-03 ASSESSMENT — EDINBURGH POSTNATAL DEPRESSION SCALE (EPDS)
I HAVE LOOKED FORWARD WITH ENJOYMENT TO THINGS: AS MUCH AS I EVER DID
I HAVE BEEN SO UNHAPPY THAT I HAVE BEEN CRYING: NO, NEVER
I HAVE BEEN SO UNHAPPY THAT I HAVE HAD DIFFICULTY SLEEPING: NOT AT ALL
I HAVE FELT SCARED OR PANICKY FOR NO GOOD REASON: NO, NOT AT ALL
I HAVE BEEN ANXIOUS OR WORRIED FOR NO GOOD REASON: NO, NOT AT ALL
I HAVE FELT SAD OR MISERABLE: NO, NOT AT ALL
THINGS HAVE BEEN GETTING ON TOP OF ME: NO, I HAVE BEEN COPING AS WELL AS EVER
THE THOUGHT OF HARMING MYSELF HAS OCCURRED TO ME: NEVER
I HAVE BEEN ABLE TO LAUGH AND SEE THE FUNNY SIDE OF THINGS: AS MUCH AS I ALWAYS COULD
I HAVE BLAMED MYSELF UNNECESSARILY WHEN THINGS WENT WRONG: NO, NEVER

## 2022-05-03 ASSESSMENT — PAIN DESCRIPTION - PAIN TYPE
TYPE: ACUTE PAIN

## 2022-05-03 NOTE — L&D DELIVERY NOTE
DATE OF SERVICE:  05/02/2022        The patient is a very pleasant 26-year-old multipara (on admission, para 4 with 4 previous vaginal deliveries) who is today 39 weeks and 0 days gestation.  She has had her prenatal care with myself during the course of this pregnancy.  She has also been seen during the course of this pregnancy by perinatology, namely [facility].  She was admitted to [facility] Labor and Delivery this morning at about 4:00 in the morning for elective induction of labor at 39 weeks' gestation.  Cervical exam this morning revealed that the cervix was approximately 2 cm dilated and 70% effaced and -2 station and the estimated fetal weight was 3400 grams.  She requested a labor epidural and so she received a labor epidural.  Her recent vaginal culture for group B strep had come back negative for group B strep.  Pitocin was started after her labor epidural was placed.  She, of note, experienced supraventricular tachycardia during the course of this pregnancy and was seen by Cardiology and prescribed labetalol, but she subsequently did not take labetalol every day.  She received her labor epidural and her labor epidural functioned well.  At about 1:00 in the afternoon, I checked her cervix and found her cervix to be 3 cm dilated and 60-70% effaced and -3 station and it was noted that she was having contractions with a frequency of about once every 3 minutes.  Then, at about 6:00 p.m. I checked her cervix again today and at that time found her cervix to be about 4 cm dilated and 70% effaced and -2 station.  I did at that time, namely 6:00 p.m. today perform artificial rupture of membranes and clear amniotic fluid was observed and then I placed an intrauterine pressure catheter and fetal scalp electrode.  After the fetal heart tracing was found to be category 1.  After this, the fetal heart tracing was found to be category 1 and her labor appeared to be adequate.   Pitocin was continued and electronic fetal monitoring was continued.  Her labor progressed.  She achieved complete cervical dilation and did not have to push for very long before delivering.  In fact, she only pushed for maybe 1-2 contractions.  It was at 4 minutes after 9:00 p.m. today, Monday, 2022 that she went on to have a normal spontaneous vaginal delivery at 39 weeks and 0 days gestation, and she was delivered of a live term female  with Apgar scores of 8 and 9 at 1 and 5 minutes respectively and a  weight, which has not yet been measured, but estimated to be approximately 3.2 kg.  Baby was delivered over an intact perineum under continuous epidural anesthesia.  The placenta was then simply delivered and examined and found to be complete.  Next, examination of the vulva and vaginal mucosa revealed no lacerations and in fact no abrasions.  Bimanual vaginal exam was then performed and revealed that the uterus was firm and that there were no gauze sponges in the vagina.  The estimated blood loss was approximately 100 mL. Of note, because of the patient's history of postpartum hemorrhage with her previous pregnancy 1000 mcg of Cytotec was placed per rectum, prophylactically.  Lap count was then found to be correct.     Addendum: the  weight was 2,945 grams.         ______________________________  Gonzalo Negrete MD    MED/SUP    DD:  2022 21:24  DT:  2022 22:16    Job#:  [id number]

## 2022-05-03 NOTE — PROGRESS NOTES
2130 - Assumed care of pt. Report received from Jennifer DRISCOLL RN. Fundus firm, bleeding scant. Denies pain.   2300 - Pt up to bathroom, steady, able to void. Pt taken up to post partum. Report given to Patience GUERRERO.

## 2022-05-03 NOTE — PROGRESS NOTES
Received telephone order from Dr. Negrete to cancel discharge order and for lexapro 10 mg Daily starting now.     1100- Meds to beds medications placed in patient  [facility].

## 2022-05-03 NOTE — ANESTHESIA TIME REPORT
Anesthesia Start and Stop Event Times     Date Time Event    5/2/2022 0620 Ready for Procedure     0621 Anesthesia Start     2104 Anesthesia Stop        Responsible Staff  05/02/22    Name Role Begin End    Kenney Duque M.D. Anesth 0621 0659    Puma Griffin M.D. Anesth 0659 2104        Overtime Reason:  no overtime (within assigned shift)    Comments: OB call Alireza

## 2022-05-03 NOTE — PROGRESS NOTES
Patient admited to postpartum from L&D via wheelchair. IV patent and infusing second bag of LR with pitocin. Fundus firm at umbilicus with light lochia.Armbands verified with infant and FOB. VSS. Oriented to surroundings and use of call light,bulb syringe and filling out infant's feeding clipboard,birth certificate and EPDS forms,formula feeding frequency and amount and calling for any needs.Denies pain at this time.

## 2022-05-03 NOTE — PROGRESS NOTES
At about 6:00 PM I checked the patient's cervix and at that time found her cervix to be 4 cm dilated and 70% effaced and -2 station and I did at that time perform artificial rupture of membranes and clear amniotic fluid was observed and then I placed an intrauterine pressure catheter and a fetal scalp electrode.  After this the fetal heart tracing was found to be category 1 and labor appeared to be adequate.  We will continue Pitocin and will continue electronic fetal monitoring and anticipate an obstetrical delivery.  Gonzalo Negrete MD

## 2022-05-03 NOTE — PROGRESS NOTES
POST PARTUM DAY # 1  The patient complains of cramping pain.   She says that she feels fine otherwise and she says that she has no other problems or complaints.   She says that she would like to go home today.   The patient's vital signs are stable and she is afebrile.   She appears well developed and well nourished and relaxed and alert and comfortable and in no apparent distress.   Labs: the patient's hemoglobin went from 10.9 grams per deciliter antepartum to 9.3 grams per deciliter post partum.   We will plan on discharge home later today.   Rx's for percocet and ibuprofen and iron sulfate and stool softener.   Follow up in office in about six weeks for a post partum visit.   Gonzalo Negrete M.D.

## 2022-05-03 NOTE — ANESTHESIA POSTPROCEDURE EVALUATION
Patient: Marlys Jane    Procedure Summary     Date: 05/02/22 Room / Location:     Anesthesia Start: 0621 Anesthesia Stop: 2104    Procedure: Labor Epidural Diagnosis:     Scheduled Providers:  Responsible Provider: Puma Griffin M.D.    Anesthesia Type: epidural ASA Status: 2          Final Anesthesia Type: epidural  Last vitals  BP   Blood Pressure: 107/56    Temp   36.9 °C (98.4 °F)    Pulse   83   Resp   18    SpO2   96 %      Anesthesia Post Evaluation    Patient location during evaluation: PACU  Patient participation: complete - patient participated  Level of consciousness: awake and alert  Pain score: 1    Airway patency: patent  Anesthetic complications: no  Cardiovascular status: hemodynamically stable  Respiratory status: acceptable  Hydration status: euvolemic    PONV: none          No complications documented.     Nurse Pain Score: 5 (NPRS)

## 2022-05-03 NOTE — L&D DELIVERY NOTE
Normal spontaneous vaginal delivery.  Live term female .  Apgar scores 8 and 9 at 1 and 5 minutes respectively.  [place] weight not yet measured but estimated to be approximately 3.2 kg.  Baby was delivered over an intact perineum under continuous epidural anesthesia.  The placenta was then simply delivered and examined and found to be complete.  Next examination of the vulva and vaginal mucosa revealed no lacerations and in fact no abrasions.  Bimanual exam was then performed and revealed that the uterus was firm and that there were no gauze sponges in the vagina.  The estimated blood loss was approximately 100 cc.  Of note because of this patient's history of previous postpartum hemorrhage with a previous pregnancy 1000 mcg of Cytotec was placed per rectum, prophylactically.  Lap count was then found to be correct.  Gonzalo Negrete MD

## 2022-05-03 NOTE — DISCHARGE PLANNING
Discharge Planning Assessment Post Partum    Reason for Referral: History of depression, anxiety, and THC  Address: [address] [place] [address] [place] [address]  Phone: [phone]  Type of Living Situation: living with FOB and children  Mom Diagnosis: Pregnancy  Baby Diagnosis: -39 weeks  Primary Language: English    Name of Baby: Chata Rosen (: 22)  Father of the Baby: Puma Rosen   Involved in baby’s care? Yes  Contact Information: [phone]    Prenatal Care: Yes  Mom's PCP: Dr. Akshat Cheema  PCP for new baby: Dr. Smith    Support System: FOB  Coping/Bonding between mother & baby: Yes  Source of Feeding: bottle feeding  Supplies for Infant: prepared for infant; denies any needs    Mom's Insurance: [employer] Medicaid  Baby Covered on Insurance:Yes  Mother Employed/School: Not currently  Other children in the home/names & ages: MOB has four children; ages 12, 11, 10, and 3 years    Financial Hardship/Income: No   Mom's Mental status: alert and oriented  Services used prior to admit: Medicaid    CPS History: No  Psychiatric History: history of depression and anxiety.  MOB scored a 0 on the EPDS screen  Domestic Violence History: No  Drug/ETOH History: history of THC-infant's UDS is negative    Resources Provided: Offered resources, however MOB declined needing anything and stated they are well-prepared for infant  Referrals Made: None     Clearance for Discharge: Infant is cleared to discharge home with parents

## 2022-05-03 NOTE — DISCHARGE PLANNING
Meds-to-Beds: Discharge prescription orders listed below delivered to patient's bedside. YOLANDA Phipps notified. Patient's SO Puma counseled.      Current Outpatient Medications   Medication Sig Dispense Refill   • oxyCODONE-acetaminophen (PERCOCET) 5-325 MG Tab Take 1 Tablet by mouth every 6 hours as needed for Moderate Pain or Severe Pain for up to 7 days. 28 Tablet 0   • ibuprofen (MOTRIN) 800 MG Tab Take 1 Tablet by mouth every 8 hours as needed for Mild Pain or Moderate Pain. 30 Tablet 0   • ferrous sulfate 325 (65 Fe) MG tablet Take 1 Tablet by mouth every day. 30 Tablet 0   • docusate sodium (COLACE) 100 MG Cap Take 1 Capsule by mouth 2 times a day. 60 Capsule 0      Alea Maria, PharmD

## 2022-05-03 NOTE — CARE PLAN
The patient is Stable - Low risk of patient condition declining or worsening    Shift Goals  Clinical Goals: Lochia light  Patient Goals: rest  Family Goals: rest    Progress made toward(s) clinical / shift goals:  Lochia light    Patient is not progressing towards the following goals:

## 2022-05-04 VITALS
BODY MASS INDEX: 36.71 KG/M2 | RESPIRATION RATE: 17 BRPM | OXYGEN SATURATION: 94 % | DIASTOLIC BLOOD PRESSURE: 64 MMHG | HEART RATE: 65 BPM | SYSTOLIC BLOOD PRESSURE: 111 MMHG | HEIGHT: 60 IN | TEMPERATURE: 97.5 F | WEIGHT: 187 LBS

## 2022-05-04 PROCEDURE — 700102 HCHG RX REV CODE 250 W/ 637 OVERRIDE(OP): Performed by: SPECIALIST

## 2022-05-04 PROCEDURE — A9270 NON-COVERED ITEM OR SERVICE: HCPCS | Performed by: SPECIALIST

## 2022-05-04 RX ADMIN — ESCITALOPRAM OXALATE 10 MG: 10 TABLET ORAL at 08:06

## 2022-05-04 RX ADMIN — IBUPROFEN 800 MG: 800 TABLET, FILM COATED ORAL at 08:05

## 2022-05-04 RX ADMIN — PRENATAL WITH FERROUS FUM AND FOLIC ACID 1 TABLET: 3080; 920; 120; 400; 22; 1.84; 3; 20; 10; 1; 12; 200; 27; 25; 2 TABLET ORAL at 08:06

## 2022-05-04 ASSESSMENT — PAIN DESCRIPTION - PAIN TYPE: TYPE: ACUTE PAIN

## 2022-05-04 NOTE — PROGRESS NOTES
Received bedside report from YOLANDA Phipps. Patient in bed, with reports of pain. Medicated see MAR. Patient will call when needing pain medication. Whiteboards updated, POC discussed. Call light within reach. Patient encouraged to call with any needs and or concerns.

## 2022-05-04 NOTE — CARE PLAN
The patient is Stable - Low risk of patient condition declining or worsening    Shift Goals  Clinical Goals: Pain control, Lochia light  Patient Goals: rest  Family Goals: rest    Progress made toward(s) clinical / shift goals:  Patient declined need for pharmacological intervention. Fundus firm with scant lochia.     Patient is not progressing towards the following goals:

## 2022-05-04 NOTE — PROGRESS NOTES
Discharge instructions reviewed by patient.  screen, patient and baby f/u appts also reviewed by patient . Car seat present. Birth certificate done.patient received meds to bed.  Bands matched and security tag removed.Mom and baby ready for discharge home.

## 2022-05-04 NOTE — PROGRESS NOTES
Assessment done. Pt.c/po mild pain,motrin given.Fundus firm.Lochia light. Fob at bedside,supportive.Pt. Caring for baby with good skill.bottle feeding.

## 2022-05-04 NOTE — PROGRESS NOTES
POST PARTUM DAY # 2  Baby was not discharged home yesterday and so this Marlys’s discharge orders were canceled. Marlys tells me this morning that she feels fine and has no problems or complaints other than for some soreness. She tells me this morning that she would like to go home today. She says that she feels fine and has no problems or complaints other than for some soreness. Her vital signs are stable and she is afebrile and she appears well developed and well-nourished and relaxed and alert and comfortable and in no apparent distress. We will discharge her home today. She says that she does have or will be given the prescriptions that I wrote yesterday. I asked her to follow up with me in the office in six weeks and to call or contact me at any time should she ever have any problems or questions or complaints and she said that she would do so.  Gonzalo Negrete M.D.

## 2022-05-04 NOTE — DISCHARGE INSTRUCTIONS
PATIENT DISCHARGE EDUCATION INSTRUCTION SHEET  REASONS TO CALL YOUR OBSTETRICIAN  · Persistent fever, shaking, chills (Temperature higher than 100.4) may indicate you have an infection  · Heavy bleeding: soaking more than 1 pad per hour; Passing clots an egg-sized clot or bigger may mean you have an postpartum hemorrhage  · Foul odor from vagina or bad smelling or discolored discharge or blood  · Breast infection (Mastitis symptoms); breast pain, chills, fever, redness or red streaks, may feel flu like symptoms  · Urinary pain, burning or frequency  · Incision that is not healing, increased redness, swelling, tenderness or pain, or any pus from episiotomy or  site may mean you have an infection  · Redness, swelling, warmth, or painful to touch in the calf area of your leg may mean you have a blood clot  · Severe or intensified depression, thoughts or feelings of wanting to hurt yourself or someone else   · Pain in chest, obstructed breathing or shortness of breath (trouble catching your breath) may mean you are having a postpartum complication. Call your provider immediately   · Headache that does not get better, even after taking medicine, a bad headache with vision changes or pain in the upper right area of your belly may mean you have high blood pressure or post birth preeclampsia. Call your provider immediately    HAND WASHING  All family and friends should wash their hands:  · Before and after holding the baby  · Before feeding the baby  · After using the restroom or changing the baby's diaper    WOUND CARE  Ask your physician for additional care instructions. In general:  ·  Incision:  · May shower and pat incision dry   · Keep the incision clean and dry  · There should not be any opening or pus from the incision  · Continue to walk at home 3 times a day   · Do NOT lift anything heavier than your baby (over 10 pounds)  · Encourage family to help participate in care of the  to allow  rest and mom time to heal  · Episiotomy/Laceration  · May use dustin-spray bottle, witch hazel pads and dermaplast spray for comfort  · Use dustin-spray bottle after urinating to cleanse perineal area  · To prevent burning during urination spray dustin-water bottle on labial area   · Pat perineal area dry until episiotomy/laceration is healed  · Continue to use dustin-bottle until bleeding stops as needed  · If have a 2nd degree laceration or greater, a Sitz bath can offer relief from soreness, burning, and inflammation   · Sitz Bath   · Sit in 6 inches of warm water and soak laceration as needed until the laceration heals    VAGINAL CARE AND BLEEDING  · Nothing inside vagina for 6 weeks:   · No sexual intercourse, tampons or douching  · Bleeding may continue for 2-4 weeks. Amount and color may vary  · Soaking 1 pad or more in an hour for several hours is considered heavy bleeding  · Passing large egg sized blood clots can be concerning  · If you feel like you have heavy bleeding or are having increasing amount of blood clots call your Obstetrician immediately  · If you begin feeling faint upon standing, feeling sick to your stomach, have clammy skin, a really fast heartbeat, have chills, start feeling confused, dizzy, sleepy or weak, or feeling like you're going to faint call your Obstetrician immediately    HYPERTENSION   Preeclampsia or gestational hypertension are types of high blood pressure that only pregnant women can get. It is important for you to be aware of symptoms to seek early intervention and treatment. If you have any of these symptoms immediately call your Obstetrician    · Vision changes or blurred vision   · Severe headache or pain that is unrelieved with medication and will not go away  · Persistent pain in upper abdomen or shoulder   · Increased swelling of face, feet, or hands  · Difficulty breathing or shortness of breath at rest  · Urinating less than usual    URINATION AND BOWEL MOVEMENTS  · Eating  "more fiber (bran cereal, fruits, and vegetables) and drinking plenty of fluids will help to avoid constipation  · Urinary frequency and urgency after childbirth is normal  · If you experience any urinary pain, burning or frequency call your provider    BIRTH CONTROL  · It is possible to become pregnant at any time after delivery and while breastfeeding  · Plan to discuss a method of birth control with your physician at your post delivery follow up visit    POSTPARTUM BLUES  During the first few days after birth, you may experience a sense of the \"blues\" which may include impatience, irritability or even crying. These feelings come and go quickly. However, as many as 1 in 10 women experience emotional symptoms known as postpartum depression.     POSTPARTUM DEPRESSION    May start as early as the second or third day after delivery or take several weeks or months to develop. Symptoms of \"blues\" are present, but are more intense: Crying spells; loss of appetite; feelings of hopelessness or loss of control; fear of touching the baby; over concern or no concern at all about the baby; little or no concern about your own appearance/caring for yourself; and/or inability to sleep or excessive sleeping. Contact your Obstetrician if you are experiencing any of these symptoms     PREVENTING SHAKEN BABY  If you are angry or stressed, PUT THE BABY IN THE CRIB, step away, take some deep breaths, and wait until you are calm to care for the baby. DO NOT SHAKE THE BABY. You are not alone, call a supporter for help.  · [facility] 24/7 crisis call line (013-628-8238) or (1-828.430.2828)  · You can also text them, text \"ANSWER\" (194436)      "

## 2022-05-09 NOTE — DISCHARGE SUMMARY
DATE OF ADMISSION:  2022   DATE OF DISCHARGE:  2022     ADMISSION DIAGNOSES:  1.  Intrauterine pregnancy at 39 weeks and 0 days gestation.  2.  The patient requests elective induction of labor.     DISCHARGE DIAGNOSES:  1.  Intrauterine pregnancy at 39 weeks and 0 days gestation.  2.  The patient requests elective induction of labor.  3.  Live term female  with Apgar scores of 8 and 9 at 1 and 5 minutes   respectively and a  weight of 2945 grams.     PROCEDURE:  Normal spontaneous vaginal delivery.     COMPLICATIONS:  None.     HOSPITAL COURSE:  The patient is a very pleasant 26-year-old multipara (on   admission para 4 with 4 previous vaginal deliveries) who on the day of   admission was 39 weeks and 0 days gestation.  She had her prenatal care with   myself during the course of her pregnancy.  She also had been seen during the   course of her pregnancy by perinatology, namely [facility].    She was admitted to [facility] Labor and Delivery in the   morning at about 4 o'clock in the morning and she was admitted for elective   induction of labor at 39 weeks' gestation.  Cervical exam in the morning   revealed that the cervix was approximately 2 cm dilated and 70% effaced and -2   station and the estimated fetal weight at that time was 3400 grams.  She   requested labor epidural and so she received labor epidural.  It was noted   that her recent vaginal culture for group B Strep had come back negative for   group B Strep.  After she received her labor epidural Pitocin was started and   gradually increased in the usual fashion.  At about 1 o'clock in the   afternoon, I checked her cervix and at that time found her cervix to be about   3 cm dilated and 60-70% effaced and -3 station.  It was noted that she was   having contractions with a frequency of about once every 3 minutes.  Then, at   about 6:00 p.m. I checked her cervix again and at that time found her  cervix   to be about 4 cm dilated and 70% effaced and -2 station.  I did at that time   namely at about 6:00 p.m. perform artificial rupture of membranes and clear   amniotic fluid was observed and I placed an intrauterine pressure catheter.  I   placed a fetal scalp electrode.  After the fetal scalp electrode was placed,   the fetal heart tracing was found to be category 1.  After this, the fetal   heart tracing was found to be category 1 and her labor was found to be   adequate.  Pitocin was continued and electronic fetal monitoring was   continued.  Her labor progressed.  She achieved complete cervical dilation and   did not have to push for very long before delivering.  In fact, she only   pushed for about one to two contractions.  It was about 4 minutes after 9:00   p.m., Monday, 2022 that she went on to have a normal spontaneous vaginal   delivery, at 39 weeks and 0 day gestation, and she was delivered of a live   term female  with Apgar scores of 8 and 9 at 1 and 5 minutes   respectively and a  weight of 2945 grams.  Baby was delivered over an   intact perineum under continuous epidural anesthesia.  The placenta was simply   delivered and examined and found to be complete.  Next, examination of the   vulva and vaginal mucosa revealed no lacerations and in fact no abrasions.    Bimanual exam was then performed and revealed that the uterus was firm and   that there were no gauze sponges in the vagina.  The estimated blood loss was   approximately 100 mL.     The next day, 2022, postpartum day #1, the patient complained of some   cramping pain and said that she felt fine otherwise and had no other problems   or complaints.  She did say that she was interested in being discharged home   on that day.  It was noted that her hemoglobin had decreased from 10.9 g/dL to   9.3 g/dL.  Prescriptions for Percocet and ibuprofen, iron sulfate and stool   softener were sent in electronically to her  pharmacy.  However, later in the   day she changed her mind and decided not to be discharged home.  So, the next   day, 05/04/2022, postpartum day #2 she said that she wished to go home today   and baby was also discharged and so she was discharged home on that day,   namely postpartum day #2 with the aforementioned prescriptions and   instructions to follow up with me in the office in about 6 weeks and to call   or contact me at any time should she ever have any problems or questions or   complaints and she said that she would do so.        ______________________________  MD YVONNE Tatum/CORTNEY    DD:  05/08/2022 21:40  DT:  05/08/2022 22:17    Job#:  [id number]

## 2022-06-13 NOTE — H&P
DATE OF ADMISSION:  05/02/2022     IDENTIFICATION:  The patient is a very pleasant 26-year-old multipara with 4   previous vaginal deliveries, who is on the day of admission 39 weeks and 0   days gestation.     CHIEF COMPLAINT:  The patient has no complaints other than for generalized   discomfort consistent with term pregnancy.     HISTORY OF PRESENT ILLNESS:  The patient had her prenatal care with myself   during the course of her pregnancy and also been seen during the course of her   pregnancy by perinatology, namely [facility].  She was   admitted to [facility] in the morning   at about 4:00 in the morning for elective induction of labor at 39 weeks'   gestation.  Cervical exam in the morning revealed that the cervix was   approximately 2 cm dilated and 70% effaced and -2 station and the estimated   fetal weight was 3400 grams.  She requested a labor epidural and received a   labor epidural and it was noted that her recent vaginal culture came back   negative for group B strep.  Pitocin was started.     PAST MEDICAL HISTORY:  The patient says that she has prediabetes.  Also, she   has a history of asthma and a history of anemia.  She developed   supraventricular tachycardia, paroxysmal, during her pregnancy.     PAST SURGICAL HISTORY:  The patient had procedure in 2020 for treatment of   urolithiasis.  Actually, she had multiple procedures between 2016 and 2020.     MEDICATIONS:  The patient takes Klonopin, Lexapro and labetalol and was also   taking Alida.     ALLERGIES:  No known drug allergies.     SOCIAL HISTORY:  The patient denies smoking.  She denies consuming alcoholic   beverages.  She denies use of recreational drugs.     REVIEW OF SYSTEMS:  GENERAL:  No fevers, chills or sweats.  PULMONARY:  No coughing, wheezing, chest pain or shortness of breath.  CARDIOVASCULAR:  No palpitations, dyspnea or chest pain.  GASTROINTESTINAL:  No nausea, vomiting,  diarrhea, constipation.  GENITOURINARY:  The patient feels fetal movement.  She denies any vaginal   bleeding.  She denies any leakage of fluid per vagina.  She denied any   frequent painful uterine contractions on admission.  MUSCULOSKELETAL:  No arthralgias or myalgias other than for hip pain and low   back pain.  NEUROLOGICAL:  No headaches, syncope or seizures.     PHYSICAL EXAMINATION:  VITAL SIGNS:  The patient on admission, vital signs were stable and she was   afebrile.  GENERAL:  She appeared well developed, well nourished, relaxed, alert and   comfortable and in no apparent distress.  HEENT:  Normocephalic, atraumatic.  CHEST:  Regular rate and rhythm, no murmur.  LUNGS:  Clear to auscultation bilaterally.  ABDOMEN:  Gravid, about 9-month size.  PELVIC:  Digital cervical exam performed on admission revealed that the cervix   was about 2 cm dilated and 70% effaced and -2 station and that the fetus was   in cephalic presentation.  EXTREMITIES:  No clubbing, cyanosis or edema except for perhaps mild bilateral   symmetrical lower extremity edema consistent with term pregnancy.  NEUROLOGIC:  Nonfocal.     ASSESSMENT:  1.  Intrauterine pregnancy at 39 weeks and 0 days gestation.  2.  The patient desires elective induction of labor.     PLAN:  The patient was admitted and received a labor epidural and we will   continue Pitocin and continue electronic fetal monitoring and anticipate an   obstetrical delivery.        ______________________________  MD YVONNE Tatum/STANLEY    DD:  06/12/2022 21:23  DT:  06/12/2022 22:42    Job#:  [id number]

## 2023-08-22 ENCOUNTER — HOSPITAL ENCOUNTER (EMERGENCY)
Facility: MEDICAL CENTER | Age: 28
End: 2023-08-22
Payer: MEDICAID

## 2023-08-22 VITALS
WEIGHT: 197.09 LBS | HEIGHT: 60 IN | TEMPERATURE: 98.6 F | OXYGEN SATURATION: 97 % | HEART RATE: 72 BPM | RESPIRATION RATE: 16 BRPM | BODY MASS INDEX: 38.69 KG/M2

## 2023-08-22 PROCEDURE — 302449 STATCHG TRIAGE ONLY (STATISTIC)

## 2023-08-22 ASSESSMENT — FIBROSIS 4 INDEX: FIB4 SCORE: .5263157894736842105

## 2023-10-04 ENCOUNTER — OFFICE VISIT (OUTPATIENT)
Dept: URGENT CARE | Facility: CLINIC | Age: 28
End: 2023-10-04
Payer: MEDICAID

## 2023-10-04 VITALS
RESPIRATION RATE: 16 BRPM | HEIGHT: 60 IN | WEIGHT: 180 LBS | BODY MASS INDEX: 35.34 KG/M2 | HEART RATE: 97 BPM | SYSTOLIC BLOOD PRESSURE: 124 MMHG | TEMPERATURE: 97 F | DIASTOLIC BLOOD PRESSURE: 70 MMHG | OXYGEN SATURATION: 99 %

## 2023-10-04 DIAGNOSIS — H57.12 PAIN OF LEFT EYE: ICD-10-CM

## 2023-10-04 DIAGNOSIS — H57.89 EYE REDNESS: ICD-10-CM

## 2023-10-04 PROBLEM — F41.9 ANXIETY: Status: ACTIVE | Noted: 2023-10-04

## 2023-10-04 PROBLEM — I48.91 AFIB (HCC): Status: ACTIVE | Noted: 2023-10-04

## 2023-10-04 PROBLEM — N20.0 KIDNEY STONE: Status: ACTIVE | Noted: 2023-08-14

## 2023-10-04 PROCEDURE — 3078F DIAST BP <80 MM HG: CPT | Performed by: NURSE PRACTITIONER

## 2023-10-04 PROCEDURE — 99203 OFFICE O/P NEW LOW 30 MIN: CPT | Performed by: NURSE PRACTITIONER

## 2023-10-04 PROCEDURE — 3074F SYST BP LT 130 MM HG: CPT | Performed by: NURSE PRACTITIONER

## 2023-10-04 ASSESSMENT — FIBROSIS 4 INDEX: FIB4 SCORE: .5263157894736842105

## 2023-10-04 NOTE — PROGRESS NOTES
Chief Complaint   Patient presents with    Eye Pain     LEFT EYE HAS BLISTERS        HISTORY OF PRESENT ILLNESS: Patient is a pleasant 28 y.o. female who presents to urgent care today with concerns of left eye pain.  Patient notes her symptoms started yesterday after being exposed to UV light.  Her symptoms have worsened since.  She endorses associated photophobia, foreign body sensation.  She has tried over-the-counter eyedrops without improvement.  She wears glasses, not contacts.  She does have false eyelashes.    Patient Active Problem List    Diagnosis Date Noted    Afib (HCC) 10/04/2023    Anxiety 10/04/2023    Kidney stone 08/14/2023    14 weeks gestation of pregnancy 11/12/2021    Palpitations 08/02/2021       Allergies:Patient has no known allergies.    Current Outpatient Medications Ordered in Epic   Medication Sig Dispense Refill    ferrous sulfate 325 (65 Fe) MG tablet Take 1 Tablet by mouth every day. 30 Tablet 0    clonazePAM (KLONOPIN) 1 MG Tab Take 1 mg by mouth 2 times a day.      escitalopram (LEXAPRO) 5 MG tablet Take 10 mg by mouth every day. Indications: Generalized Anxiety Disorder      albuterol 108 (90 Base) MCG/ACT Aero Soln inhalation aerosol Inhale 1-2 Puffs every four hours as needed for Shortness of Breath.      hydrOXYzine pamoate (VISTARIL) 100 MG Cap Take 100 mg by mouth 1 time a day as needed for Anxiety.      ibuprofen (MOTRIN) 800 MG Tab Take 1 Tablet by mouth every 8 hours as needed for Mild Pain or Moderate Pain. (Patient not taking: Reported on 10/4/2023) 30 Tablet 0    docusate sodium (COLACE) 100 MG Cap Take 1 Capsule by mouth 2 times a day. (Patient not taking: Reported on 10/4/2023) 60 Capsule 0    Prenatal Vit-DSS-Fe Cbn-FA (PRENATAL AD PO) Take  by mouth. (Patient not taking: Reported on 10/4/2023)      labetalol (NORMODYNE) 100 MG Tab Take 0.5 Tablets by mouth every day. (Patient not taking: Reported on 11/12/2021) 60 Tablet 0     No current Epic-ordered  facility-administered medications on file.       Past Medical History:   Diagnosis Date    Anxiety 2017    Arrhythmia     SVT, cardiac anxiety    Asthma     Gall stones     Kidney disease     kidney stones    Psychiatric problem     depression/anxiety       Social History     Tobacco Use    Smoking status: Never    Smokeless tobacco: Never   Substance Use Topics    Alcohol use: Not Currently     Comment: occasional    Drug use: Yes     Types: Inhaled     Comment: 2021, marijuana       Family Status   Relation Name Status    Mo  Alive    Fa          murdered     MGMo  Alive    MGFa  Alive    PGMo  Alive    PGFa  Alive     Family History   Problem Relation Age of Onset    No Known Problems Maternal Grandmother     No Known Problems Maternal Grandfather     No Known Problems Paternal Grandmother     No Known Problems Paternal Grandfather        ROS:  Review of Systems   Constitutional: Negative for fever, chills, weight loss, malaise, and fatigue.   HENT: Negative for ear pain, nosebleeds, congestion, sore throat and neck pain.    Eyes: Positive for left eye erythema, irritation, pain, photophobia, foreign body sensation.  Negative for vision changes.   Neuro: Negative for headache, sensory changes, weakness, seizure, LOC.   Cardiovascular: Negative for chest pain, palpitations, orthopnea and leg swelling.   Respiratory: Negative for cough, sputum production, shortness of breath and wheezing.   Gastrointestinal: Negative for abdominal pain, nausea, vomiting or diarrhea.   Genitourinary: Negative for dysuria, urgency and frequency.  Musculoskeletal: Negative for falls, neck pain, back pain, joint pain, myalgias.   Skin: Negative for rash, diaphoresis.     Exam:  /70   Pulse 97   Temp 36.1 °C (97 °F)   Resp 16   Ht 1.524 m (5')   Wt 81.6 kg (180 lb)   SpO2 99%   General: well-nourished, well-developed female in NAD  Head: normocephalic, atraumatic  Eyes: PERRLA, left-sided conjunctival  injection, acuity grossly intact, lids normal.  Left eye: 3 mm circular fluorescein uptake at 11 o'clock position.  Ears: normal shape and symmetry, no tenderness, no discharge. External canals are without any significant edema or erythema. Tympanic membranes are without any inflammation, no effusion. Gross auditory acuity is intact.  Nose: symmetrical without tenderness, no discharge.  Mouth/Throat: reasonable hygiene, no erythema, exudates or tonsillar enlargement.  Neck: no masses, range of motion within normal limits, no tracheal deviation. No obvious thyroid enlargement.   Lymph: no cervical adenopathy. No supraclavicular adenopathy.   Neuro: alert and oriented. Cranial nerves 1-12 grossly intact. No sensory deficit.   Cardiovascular: regular rate and rhythm. No edema.  Pulmonary: no distress. Chest is symmetrical with respiration, no wheezes, crackles, or rhonchi.   Musculoskeletal: no clubbing, appropriate muscle tone, gait is stable.  Skin: warm, dry, intact, no clubbing, no cyanosis, no rashes.   Psych: appropriate mood, affect, judgement.         Assessment/Plan:  1. Eye redness        2. Pain of left eye            Patient presents with left eye redness, pain, photophobia, foreign body sensation.  Upon examination fluorescein uptake noted. Differential diagnoses include but are not limited to: Abrasion, keratitis. At this time, I feel the patient requires a higher level of care by an eye care professional.  She is instructed follow-up with her eye doctor today.  If she is unable to see her eye doctor, she is instructed to go to the emergency department, she is in agreement.         Please note that this dictation was created using voice recognition software. I have made every reasonable attempt to correct obvious errors, but I expect that there are errors of grammar and possibly content that I did not discover before finalizing the note.      ALPESH Phillips.

## 2023-10-04 NOTE — LETTER
October 4, 2023         Patient: Marlys Jane   YOB: 1995   Date of Visit: 10/4/2023           To Whom it May Concern:    Marlys Jane was seen in my clinic on 10/4/2023. She may be excused from school today.     If you have any questions or concerns, please don't hesitate to call.        Sincerely,           KURT Phillips  Electronically Signed

## (undated) DEVICE — CONNECTOR HOSE NEPTUNE FOR CYSTO ROOM

## (undated) DEVICE — GLOVE BIOGEL SZ 7.5 SURGICAL PF LTX - (50PR/BX 4BX/CA)

## (undated) DEVICE — LACTATED RINGERS INJ 1000 ML - (14EA/CA 60CA/PF)

## (undated) DEVICE — GLOVE SZ 7.5 BIOGEL PI MICRO - PF LF (50PR/BX)

## (undated) DEVICE — ELECTRODE 850 FOAM ADHESIVE - HYDROGEL RADIOTRNSPRNT (50/PK)

## (undated) DEVICE — KIT ROOM DECONTAMINATION

## (undated) DEVICE — SENSOR SPO2 NEO LNCS ADHESIVE (20/BX) SEE USER NOTES

## (undated) DEVICE — NEPTUNE 4 PORT MANIFOLD - (20/PK)

## (undated) DEVICE — COVER FOOT UNIVERSAL DISP. - (25EA/CA)

## (undated) DEVICE — TOWELS CLOTH SURGICAL - (4/PK 20PK/CA)

## (undated) DEVICE — GOWN SURGEONS X-LARGE - DISP. (30/CA)

## (undated) DEVICE — BAG URODRAIN WITH TUBING - (20/CA)

## (undated) DEVICE — GOWN SURGICAL X-LARGE ULTRA - FILM-REINFORCED (20/CA)

## (undated) DEVICE — SLEEVE, VASO, THIGH, MED

## (undated) DEVICE — SYRINGE DISP. 12 CC LL - (100/BX)

## (undated) DEVICE — SCOPE DIGITAL URETEROSCOPE DISPOSABLE

## (undated) DEVICE — SET IRRIGATION CYSTOSCOPY Y-TYPE L81 IN (20EA/CA)

## (undated) DEVICE — HEAD HOLDER JUNIOR/ADULT

## (undated) DEVICE — SPONGE GAUZESTER 4 X 4 4PLY - (128PK/CA)

## (undated) DEVICE — GOWN WARMING STANDARD FLEX - (30/CA)

## (undated) DEVICE — SODIUM CHL. IRRIGATION 0.9% 3000ML (4EA/CA 65CA/PF)

## (undated) DEVICE — PROTECTOR ULNA NERVE - (36PR/CA)

## (undated) DEVICE — MASK ANESTHESIA ADULT  - (100/CA)

## (undated) DEVICE — LASER TRAC TIP 200 MIRCON FOR 100 WATT LASER

## (undated) DEVICE — WATER IRRIG. STER 3000 ML - (4/CA)

## (undated) DEVICE — CONTAINER SPECIMEN BAG OR - STERILE 4 OZ W/LID (100EA/CA)

## (undated) DEVICE — CATHETER URETHRAL OPEN END AXXCESS (10EA/BX)

## (undated) DEVICE — PACK CYSTOSCOPY III - (8/CA)

## (undated) DEVICE — KIT ANESTHESIA W/CIRCUIT & 3/LT BAG W/FILTER (20EA/CA)

## (undated) DEVICE — WATER IRRIG. STER. 1500 ML - (9/CA)

## (undated) DEVICE — CATHETER URET OPEN END 6FR (10EA/BX)

## (undated) DEVICE — ZIPWIRE .038 STRAIGHT BENTSON TIP (5EA/BX)

## (undated) DEVICE — SET LEADWIRE 5 LEAD BEDSIDE DISPOSABLE ECG (1SET OF 5/EA)

## (undated) DEVICE — JELLY, KY 2 0Z STERILE